# Patient Record
Sex: FEMALE | Race: WHITE | NOT HISPANIC OR LATINO | Employment: FULL TIME | URBAN - METROPOLITAN AREA
[De-identification: names, ages, dates, MRNs, and addresses within clinical notes are randomized per-mention and may not be internally consistent; named-entity substitution may affect disease eponyms.]

---

## 2023-05-01 ENCOUNTER — APPOINTMENT (OUTPATIENT)
Dept: LAB | Facility: LAB | Age: 53
End: 2023-05-01
Payer: COMMERCIAL

## 2023-05-09 LAB
ABO GROUP (TYPE) IN BLOOD: NORMAL
ACTIVATED PARTIAL THROMBOPLASTIN TIME IN PPP BY COAGULATION ASSAY: 31 SEC (ref 26–39)
ANION GAP IN SER/PLAS: 14 MMOL/L (ref 10–20)
ANTIBODY SCREEN: NORMAL
CALCIUM (MG/DL) IN SER/PLAS: 9.7 MG/DL (ref 8.6–10.6)
CARBON DIOXIDE, TOTAL (MMOL/L) IN SER/PLAS: 28 MMOL/L (ref 21–32)
CHLORIDE (MMOL/L) IN SER/PLAS: 105 MMOL/L (ref 98–107)
CREATININE (MG/DL) IN SER/PLAS: 0.83 MG/DL (ref 0.5–1.05)
ERYTHROCYTE DISTRIBUTION WIDTH (RATIO) BY AUTOMATED COUNT: 12.6 % (ref 11.5–14.5)
ERYTHROCYTE MEAN CORPUSCULAR HEMOGLOBIN CONCENTRATION (G/DL) BY AUTOMATED: 33.4 G/DL (ref 32–36)
ERYTHROCYTE MEAN CORPUSCULAR VOLUME (FL) BY AUTOMATED COUNT: 93 FL (ref 80–100)
ERYTHROCYTES (10*6/UL) IN BLOOD BY AUTOMATED COUNT: 4.18 X10E12/L (ref 4–5.2)
GFR FEMALE: 85 ML/MIN/1.73M2
GLUCOSE (MG/DL) IN SER/PLAS: 88 MG/DL (ref 74–99)
HEMATOCRIT (%) IN BLOOD BY AUTOMATED COUNT: 38.9 % (ref 36–46)
HEMOGLOBIN (G/DL) IN BLOOD: 13 G/DL (ref 12–16)
INR IN PPP BY COAGULATION ASSAY: 0.8 (ref 0.9–1.1)
LEUKOCYTES (10*3/UL) IN BLOOD BY AUTOMATED COUNT: 5.5 X10E9/L (ref 4.4–11.3)
NRBC (PER 100 WBCS) BY AUTOMATED COUNT: 0 /100 WBC (ref 0–0)
PLATELETS (10*3/UL) IN BLOOD AUTOMATED COUNT: 222 X10E9/L (ref 150–450)
POTASSIUM (MMOL/L) IN SER/PLAS: 4.2 MMOL/L (ref 3.5–5.3)
PROTHROMBIN TIME (PT) IN PPP BY COAGULATION ASSAY: 9.6 SEC (ref 9.8–13.4)
RH FACTOR: NORMAL
SODIUM (MMOL/L) IN SER/PLAS: 143 MMOL/L (ref 136–145)
UREA NITROGEN (MG/DL) IN SER/PLAS: 14 MG/DL (ref 6–23)

## 2023-08-10 ENCOUNTER — HOSPITAL ENCOUNTER (OUTPATIENT)
Dept: DATA CONVERSION | Facility: HOSPITAL | Age: 53
End: 2023-08-10
Attending: INTERNAL MEDICINE | Admitting: INTERNAL MEDICINE
Payer: COMMERCIAL

## 2023-08-10 DIAGNOSIS — Z80.3 FAMILY HISTORY OF MALIGNANT NEOPLASM OF BREAST: ICD-10-CM

## 2023-08-10 DIAGNOSIS — Z12.11 ENCOUNTER FOR SCREENING FOR MALIGNANT NEOPLASM OF COLON: ICD-10-CM

## 2023-10-16 PROBLEM — D64.9 ANEMIA: Status: ACTIVE | Noted: 2023-10-16

## 2023-10-16 PROBLEM — H31.001 CHORIORETINAL SCAR OF RIGHT EYE: Status: ACTIVE | Noted: 2023-10-16

## 2023-10-16 PROBLEM — N93.9 ABNORMAL UTERINE BLEEDING (AUB): Status: ACTIVE | Noted: 2023-10-16

## 2023-10-16 PROBLEM — Z15.09 MONOALLELIC MUTATION OF PALB2 GENE: Status: ACTIVE | Noted: 2023-10-16

## 2023-10-16 PROBLEM — M25.50 ARTHRALGIA OF MULTIPLE SITES: Status: ACTIVE | Noted: 2023-10-16

## 2023-10-16 PROBLEM — E04.9 ENLARGED THYROID: Status: ACTIVE | Noted: 2023-10-16

## 2023-10-16 PROBLEM — N62 MACROMASTIA: Status: ACTIVE | Noted: 2023-10-16

## 2023-10-16 PROBLEM — R53.83 FATIGUE: Status: ACTIVE | Noted: 2023-10-16

## 2023-10-16 PROBLEM — Z15.89 MONOALLELIC MUTATION OF PALB2 GENE: Status: ACTIVE | Noted: 2023-10-16

## 2023-10-16 PROBLEM — H52.13 BILATERAL MYOPIA: Status: ACTIVE | Noted: 2023-10-16

## 2023-10-16 PROBLEM — E04.1 THYROID NODULE: Status: ACTIVE | Noted: 2023-10-16

## 2023-10-16 PROBLEM — J30.2 SEASONAL ALLERGIES: Status: ACTIVE | Noted: 2023-10-16

## 2023-10-16 PROBLEM — N94.6 DYSMENORRHEA: Status: ACTIVE | Noted: 2023-10-16

## 2023-10-16 PROBLEM — R30.0 DYSURIA: Status: ACTIVE | Noted: 2023-10-16

## 2023-10-16 PROBLEM — E55.9 VITAMIN D DEFICIENCY: Status: ACTIVE | Noted: 2023-10-16

## 2023-10-16 PROBLEM — Z15.01 MONOALLELIC MUTATION OF PALB2 GENE: Status: ACTIVE | Noted: 2023-10-16

## 2023-10-16 RX ORDER — ALBUTEROL SULFATE 90 UG/1
2 AEROSOL, METERED RESPIRATORY (INHALATION) 4 TIMES DAILY PRN
COMMUNITY
Start: 2019-04-15 | End: 2023-11-27

## 2023-10-16 RX ORDER — NORETHINDRONE ACETATE AND ETHINYL ESTRADIOL, AND FERROUS FUMARATE 1MG-20(24)
1 KIT ORAL DAILY
COMMUNITY
Start: 2018-03-20 | End: 2023-11-06

## 2023-10-16 RX ORDER — AZELASTINE 1 MG/ML
2 SPRAY, METERED NASAL 2 TIMES DAILY
COMMUNITY
Start: 2019-05-07 | End: 2023-11-06

## 2023-10-16 RX ORDER — ELECTROLYTES/DEXTROSE
SOLUTION, ORAL ORAL
COMMUNITY
End: 2023-11-06

## 2023-10-16 RX ORDER — IBUPROFEN 800 MG/1
800 TABLET ORAL 3 TIMES DAILY PRN
COMMUNITY
Start: 2016-02-22 | End: 2023-11-06

## 2023-10-16 RX ORDER — PNV NO.95/FERROUS FUM/FOLIC AC 28MG-0.8MG
TABLET ORAL
COMMUNITY
End: 2023-11-06

## 2023-10-17 ENCOUNTER — OFFICE VISIT (OUTPATIENT)
Dept: SURGICAL ONCOLOGY | Facility: HOSPITAL | Age: 53
End: 2023-10-17
Payer: COMMERCIAL

## 2023-10-17 VITALS — HEART RATE: 56 BPM | TEMPERATURE: 96.6 F | DIASTOLIC BLOOD PRESSURE: 84 MMHG | SYSTOLIC BLOOD PRESSURE: 129 MMHG

## 2023-10-17 DIAGNOSIS — Z15.01 MONOALLELIC MUTATION OF PALB2 GENE: Primary | ICD-10-CM

## 2023-10-17 DIAGNOSIS — N94.6 DYSMENORRHEA: ICD-10-CM

## 2023-10-17 DIAGNOSIS — M25.50 ARTHRALGIA OF MULTIPLE SITES: ICD-10-CM

## 2023-10-17 DIAGNOSIS — N62 MACROMASTIA: ICD-10-CM

## 2023-10-17 DIAGNOSIS — E55.9 VITAMIN D DEFICIENCY: ICD-10-CM

## 2023-10-17 DIAGNOSIS — E04.1 THYROID NODULE: ICD-10-CM

## 2023-10-17 DIAGNOSIS — Z15.89 MONOALLELIC MUTATION OF PALB2 GENE: Primary | ICD-10-CM

## 2023-10-17 DIAGNOSIS — Z15.09 MONOALLELIC MUTATION OF PALB2 GENE: Primary | ICD-10-CM

## 2023-10-17 PROCEDURE — 99215 OFFICE O/P EST HI 40 MIN: CPT | Performed by: SURGERY

## 2023-10-17 PROCEDURE — 1036F TOBACCO NON-USER: CPT | Performed by: SURGERY

## 2023-10-17 RX ORDER — SODIUM CHLORIDE 9 MG/ML
100 INJECTION, SOLUTION INTRAVENOUS CONTINUOUS
Status: CANCELLED | OUTPATIENT
Start: 2023-10-17

## 2023-10-17 ASSESSMENT — ENCOUNTER SYMPTOMS
COUGH: 1
CARDIOVASCULAR NEGATIVE: 1
NEUROLOGICAL NEGATIVE: 1
HEMATOLOGIC/LYMPHATIC NEGATIVE: 1
EYES NEGATIVE: 1
PSYCHIATRIC NEGATIVE: 1
MUSCULOSKELETAL NEGATIVE: 1
UNEXPECTED WEIGHT CHANGE: 1
GASTROINTESTINAL NEGATIVE: 1
ENDOCRINE NEGATIVE: 1
ALLERGIC/IMMUNOLOGIC NEGATIVE: 1

## 2023-10-17 NOTE — H&P (VIEW-ONLY)
Subjective   Patient ID: Cheyanne Rubio is a 53 y.o. female.    HPI  Cheyanne Rubio is a very pleasant 53 year old  female with a PALB2 mutation and family history of breast, ovarian, pancreatic, and prostate returning for surgical planning. Bilateral skin sparing mastostomy and ALEKSANDR reconstruction scheduled 11/10/2023.     On 2022 bilateral diagnostic mammogram and left breast ultrasound showed scattered fibroglandular breast tissue. The left breast ultrasound showed normal breast parenchyma at the area of palpable abnormality, BI-RADS Category 1.     On 23, bilateral breast MRI showed no evidence of malignancy in either breast. BI-RADS Category: 2     She met with Dr. Henderson in plastic surgery to discuss reconstruction and is planning bilateral ALEKSANDR. Her preadmission testing visit is scheduled for 23.     She fractured her toe, so she was unable to run a half marathon. She is back to walking and increasing her physical activity. Recent bronchitis, treated with prednisone.       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    FAMILY HISTORY: Mother with pancreatic cancer diagnosed at 75  at 75 in 2023  Father  of pancreatic cancer diagnosed at 72  at 72  Paternal aunt with breast and pancreatic cancer diagnosed at 50  at 65  Paternal aunt with breast and pancreatic cancer diagnosed at 50  at 65  Paternal uncle with prostate and pancreatic cancer diagnosed at 70  at 70  Paternal  cousin diagnosed with ovarian cancer at 48  at 50    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    Other ovarian dysfunction 10/24/2014    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    Pain in unspecified joint 2018    Arthralgia of multiple sites    Personal history of other diseases of the female genital tract     Personal history of endometriosis    Personal history of other diseases of the female genital tract     Personal history of female infertility    Polyp of corpus uteri     Uterine polyp    Stricture and stenosis of cervix uteri     Cervical stenosis (uterine cervix)    Vitamin D deficiency, unspecified 2018    Vitamin D deficiency     Current Outpatient Medications on File Prior to Visit   Medication Sig Dispense Refill    albuterol (ProAir HFA) 90 mcg/actuation inhaler Inhale 2 puffs 4 times a day as needed.      cyanocobalamin (Vitamin B-12) 100 mcg tablet Vitamin B12 100 MCG Oral Tablet   Refills: 0       Active      fish oil concentrate (Omega-3) 120-180 mg capsule Fish Oil 1000 MG Oral Capsule   Refills: 0       Active      azelastine (Astelin) 137 mcg (0.1 %) nasal spray Administer 2 sprays into each nostril 2 times a day.      biotin 5 mg capsule PA Biotin 5000 MCG CAPS   Refills: 0       Active      coffee extract 400 mg tablet Green Coffee Bean Extract CAPS   Refills: 0        Active      ibuprofen 800 mg tablet Take 1 tablet (800 mg) by mouth 3 times a day as needed. With food      norethindrone-e.estradioL-iron (Taytulla) 1 mg-20 mcg (24)/75 mg (4) capsule Take 1 capsule by mouth once daily. AT THE SAME TIME EACH DAY FOR       No current facility-administered medications on file prior to visit.         Review of Systems   Constitutional:  Positive for unexpected weight change.        Recent weight gain   HENT: Negative.     Eyes: Negative.    Respiratory:  Positive for cough.    Cardiovascular: Negative.    Gastrointestinal: Negative.    Endocrine: Negative.    Genitourinary: Negative.    Musculoskeletal: Negative.    Skin: Negative.    Allergic/Immunologic: Negative.    Neurological: Negative.    Hematological: Negative.    Psychiatric/Behavioral: Negative.     All other systems reviewed and are negative.      Objective   Physical Exam  General: Otherwise healthy appearing. No acute distress.     HEENT: Conjunctiva well-colored, sclera non-icteric. Neck supple, trachea midline. No lymphadenopathy or thyromegaly.     Cardiovascular: Regular rate and rhythm.    Respiratory: Clear to auscultation bilaterally.     Breast: Exam performed in the sitting and supine positions. Right breast: No obvious abnormalities palpable. No skin or nipple changes. Left breast: No obvious abnormalities palpable. No skin or nipple changes. Bra size 38DDD, grade 2 ptosis    Abdomen: Soft, non-tender, non-distended.    Extremities: Atraumatic, no edema.     Neurologic: Motor and sensory grossly intact. Alert and oriented.     Lymphatic: No axillary, supraclavicular, or infraclavicular lymphadenopathy bilaterally.       Assessment/Plan   Today we reviewed your pertinent history, physical exam, and imaging. You have a mutation in the PALB2 gene, which increases your risk of breast cancer. Your mammogram and MRI showed no evidence of breast cancer.     We had a long discussion regarding breast cancer risk  reduction strategies, including MRI for increased surveillance, endocrine therapy, and bilateral prophylactic mastectomy. I explained the risks, benefits, and alternatives to bilateral prophylactic mastectomy. We reviewed that there is a 90% reduction in breast cancer risk, but no associated reduction in survival.    Mastectomy means removing all the breast tissue, usually including the nipple and areolar complex. You are planning ALEKSANDR reconstruction. We discussed the risks, benefits, and alternatives to surgery, including bleeding, infection, impaired wound healing, numbness, pain, and breast asymmetry. We reviewed postoperative care and expectations for recovery.    All questions were answered in detail and we are in agreement with the following plan:  1. Bilateral skin sparing prophylactic mastectomy on [facility] 11/10/23.     If you have any questions or concerns, please call my office at [phone] (option #2). I appreciate the opportunity to care for you, and look forward to seeing you soon.    Lynda Barahona MD  Breast Surgical Oncology   Department of Surgery  [facility]  Office: [phone] (option #2)  Fax: [phone]       **DISCLAIMER** Speech recognition software was used to create portions of this document. While an attempt at proofreading has been made, minor errors in transcription may be present.     Diagnoses and all orders for this visit:  Monoallelic mutation of PALB2 gene  -     Case Request Operating Room: Bilateral skin sparing mastectomy; Standing  Macromastia  Thyroid nodule  Vitamin D deficiency  Dysmenorrhea  Arthralgia of multiple sites  Other orders  -     Full code; Standing  -     Vital Signs; Standing  -     Pulse oximetry, spot; Standing  -     Apply MCKAYLA hose knee length; Standing  -     Apply sequential compression device; Standing  -     Insert peripheral IV; Standing  -     Saline lock IV; Standing  -      POCT pregnancy, urine; Standing  -     sodium chloride 0.9% infusion  -     Place in outpatient/hospital ambulatory surgery; Standing  -     ceFAZolin (Ancef) 2 g in dextrose 5 % in water (D5W) 50 mL IV      Scribe Attestation  By signing my name below, I, Lynda Barahona MD, Scribe   attest that this documentation has been prepared under the direction and in the presence of Lynda Barahona MD.

## 2023-10-17 NOTE — PROGRESS NOTES
Subjective   Patient ID: Cheyanne Rubio is a 53 y.o. female.    HPI  Cheyanne Rubio is a very pleasant 53 year old  female with a PALB2 mutation and family history of breast, ovarian, pancreatic, and prostate returning for surgical planning. Bilateral skin sparing mastostomy and ALEKSANDR reconstruction scheduled 11/10/2023.     On 2022 bilateral diagnostic mammogram and left breast ultrasound showed scattered fibroglandular breast tissue. The left breast ultrasound showed normal breast parenchyma at the area of palpable abnormality, BI-RADS Category 1.     On 23, bilateral breast MRI showed no evidence of malignancy in either breast. BI-RADS Category: 2     She met with Dr. Henderson in plastic surgery to discuss reconstruction and is planning bilateral ALEKSANDR. Her preadmission testing visit is scheduled for 23.     She fractured her toe, so she was unable to run a half marathon. She is back to walking and increasing her physical activity. Recent bronchitis, treated with prednisone.       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    FAMILY HISTORY: Mother with pancreatic cancer diagnosed at 75  at 75 in 2023  Father  of pancreatic cancer diagnosed at 72  at 72  Paternal aunt with breast and pancreatic cancer diagnosed at 50  at 65  Paternal aunt with breast and pancreatic cancer diagnosed at 50  at 65  Paternal uncle with prostate and pancreatic cancer diagnosed at 70  at 70  Paternal  cousin diagnosed with ovarian cancer at 48  at 50    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    Other ovarian dysfunction 10/24/2014    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    Pain in unspecified joint 2018    Arthralgia of multiple sites    Personal history of other diseases of the female genital tract     Personal history of endometriosis    Personal history of other diseases of the female genital tract     Personal history of female infertility    Polyp of corpus uteri     Uterine polyp    Stricture and stenosis of cervix uteri     Cervical stenosis (uterine cervix)    Vitamin D deficiency, unspecified 2018    Vitamin D deficiency     Current Outpatient Medications on File Prior to Visit   Medication Sig Dispense Refill    albuterol (ProAir HFA) 90 mcg/actuation inhaler Inhale 2 puffs 4 times a day as needed.      cyanocobalamin (Vitamin B-12) 100 mcg tablet Vitamin B12 100 MCG Oral Tablet   Refills: 0       Active      fish oil concentrate (Omega-3) 120-180 mg capsule Fish Oil 1000 MG Oral Capsule   Refills: 0       Active      azelastine (Astelin) 137 mcg (0.1 %) nasal spray Administer 2 sprays into each nostril 2 times a day.      biotin 5 mg capsule PA Biotin 5000 MCG CAPS   Refills: 0       Active      coffee extract 400 mg tablet Green Coffee Bean Extract CAPS   Refills: 0        Active      ibuprofen 800 mg tablet Take 1 tablet (800 mg) by mouth 3 times a day as needed. With food      norethindrone-e.estradioL-iron (Taytulla) 1 mg-20 mcg (24)/75 mg (4) capsule Take 1 capsule by mouth once daily. AT THE SAME TIME EACH DAY FOR       No current facility-administered medications on file prior to visit.         Review of Systems   Constitutional:  Positive for unexpected weight change.        Recent weight gain   HENT: Negative.     Eyes: Negative.    Respiratory:  Positive for cough.    Cardiovascular: Negative.    Gastrointestinal: Negative.    Endocrine: Negative.    Genitourinary: Negative.    Musculoskeletal: Negative.    Skin: Negative.    Allergic/Immunologic: Negative.    Neurological: Negative.    Hematological: Negative.    Psychiatric/Behavioral: Negative.     All other systems reviewed and are negative.      Objective   Physical Exam  General: Otherwise healthy appearing. No acute distress.     HEENT: Conjunctiva well-colored, sclera non-icteric. Neck supple, trachea midline. No lymphadenopathy or thyromegaly.     Cardiovascular: Regular rate and rhythm.    Respiratory: Clear to auscultation bilaterally.     Breast: Exam performed in the sitting and supine positions. Right breast: No obvious abnormalities palpable. No skin or nipple changes. Left breast: No obvious abnormalities palpable. No skin or nipple changes. Bra size 38DDD, grade 2 ptosis    Abdomen: Soft, non-tender, non-distended.    Extremities: Atraumatic, no edema.     Neurologic: Motor and sensory grossly intact. Alert and oriented.     Lymphatic: No axillary, supraclavicular, or infraclavicular lymphadenopathy bilaterally.       Assessment/Plan   Today we reviewed your pertinent history, physical exam, and imaging. You have a mutation in the PALB2 gene, which increases your risk of breast cancer. Your mammogram and MRI showed no evidence of breast cancer.     We had a long discussion regarding breast cancer risk  reduction strategies, including MRI for increased surveillance, endocrine therapy, and bilateral prophylactic mastectomy. I explained the risks, benefits, and alternatives to bilateral prophylactic mastectomy. We reviewed that there is a 90% reduction in breast cancer risk, but no associated reduction in survival.    Mastectomy means removing all the breast tissue, usually including the nipple and areolar complex. You are planning ALEKSANDR reconstruction. We discussed the risks, benefits, and alternatives to surgery, including bleeding, infection, impaired wound healing, numbness, pain, and breast asymmetry. We reviewed postoperative care and expectations for recovery.    All questions were answered in detail and we are in agreement with the following plan:  1. Bilateral skin sparing prophylactic mastectomy on [facility] 11/10/23.     If you have any questions or concerns, please call my office at [phone] (option #2). I appreciate the opportunity to care for you, and look forward to seeing you soon.    Lynda Barahona MD  Breast Surgical Oncology   Department of Surgery  [facility]  Office: [phone] (option #2)  Fax: [phone]       **DISCLAIMER** Speech recognition software was used to create portions of this document. While an attempt at proofreading has been made, minor errors in transcription may be present.     Diagnoses and all orders for this visit:  Monoallelic mutation of PALB2 gene  -     Case Request Operating Room: Bilateral skin sparing mastectomy; Standing  Macromastia  Thyroid nodule  Vitamin D deficiency  Dysmenorrhea  Arthralgia of multiple sites  Other orders  -     Full code; Standing  -     Vital Signs; Standing  -     Pulse oximetry, spot; Standing  -     Apply MCKAYLA hose knee length; Standing  -     Apply sequential compression device; Standing  -     Insert peripheral IV; Standing  -     Saline lock IV; Standing  -      POCT pregnancy, urine; Standing  -     sodium chloride 0.9% infusion  -     Place in outpatient/hospital ambulatory surgery; Standing  -     ceFAZolin (Ancef) 2 g in dextrose 5 % in water (D5W) 50 mL IV      Scribe Attestation  By signing my name below, I, Lynda Barahona MD, Scribe   attest that this documentation has been prepared under the direction and in the presence of Lynda Barahona MD.

## 2023-10-19 ENCOUNTER — TELEMEDICINE CLINICAL SUPPORT (OUTPATIENT)
Dept: PREADMISSION TESTING | Facility: HOSPITAL | Age: 53
End: 2023-10-19
Payer: COMMERCIAL

## 2023-11-01 NOTE — PROGRESS NOTES
Subjective :  Patient ID: Cheyanne Rubio is a 53 y.o. female.    History of Present Illness: patient is POSITIVE for a pathogenic mutation in the PALB2 gene increasing her chance of breast cancer. She is scheduled for a Bilateral Mastectomy and Bilateral ALEKSANDR reconstruction on 11/10/23 with Dr. Barahona and Dr. Gorman. She presents today prior to surgery to discuss plan of care.     Review of systems  All is negative.     Objective :  Physical Exam   Nursing note reviewed.  Constitutional  She appears well-nourished. She does not appear to be diaphoretic. No distress.     Eyes  Pupils are equal, round, and reactive to light.     General -             Right: Right eye extraocular movements are normal.             Left: Left eye extraocular movements are normal.     Cardiovascular: Normal rate and regular rhythm.     Pulmonary/Chest  Effort normal.  She has no audible wheezing.She exhibits no chest tenderness.     Skin  Skin is warm and dry.     Psychiatric  She has a normal mood and affect. Her behavior is normal. Judgment and thought content normal.       Assessment/Plan :  I had the pleasure of seeing Cheyanne today. She is scheduled for immediate skin sparing mastectomy and bilateral ALEKSANDR/ MS-TRAM reconstruction on this coming Friday. Cheyanne had a few question regarding the surgery, which I answered to the best of my medical knowledge. In summary, Cheyanne would like to achieve B-cup size, and I felt this is a reasonable goal, and it can be achieved by the completion of revision surgeries at a later stage. As the flap will typically lose 15-20% of its volume, I usually over correct by 20%. This is also to accommodate a surprise pathology and possible need for RT. Patient expressed good understanding to this. We also reviewed her CTA again and noted good perforators on the right side permissive for ALEKSANDR, and small perforators on the left suggestive of MS-TRAM, however, the decision will depend on clinical examination at  the time of surgery. Since there is a possibility for MS-TRAM on the left, abdominal mesh is recommended especially that the patient said she does not want to experience bulge or hernia. I reviewed with her the types of mesh used and their pros and cons and possible complications. Patient expressed good understanding and was agreeable to have mesh in case we harvest MS-TRAM or TRAM from left side. Finally, recovery, duration of hospital stay, and what to expect on the first post operative day were discussed with Cheyanne.      We are set to proceed on Friday 11/10/2023 with Dr. Barahona.

## 2023-11-06 ENCOUNTER — PRE-ADMISSION TESTING (OUTPATIENT)
Dept: PREADMISSION TESTING | Facility: HOSPITAL | Age: 53
End: 2023-11-06
Payer: COMMERCIAL

## 2023-11-06 ENCOUNTER — APPOINTMENT (OUTPATIENT)
Dept: PREADMISSION TESTING | Facility: HOSPITAL | Age: 53
End: 2023-11-06
Payer: COMMERCIAL

## 2023-11-06 ENCOUNTER — OFFICE VISIT (OUTPATIENT)
Dept: PLASTIC SURGERY | Facility: CLINIC | Age: 53
End: 2023-11-06
Payer: COMMERCIAL

## 2023-11-06 VITALS
BODY MASS INDEX: 31.55 KG/M2 | TEMPERATURE: 97.7 F | HEIGHT: 67 IN | DIASTOLIC BLOOD PRESSURE: 74 MMHG | SYSTOLIC BLOOD PRESSURE: 107 MMHG | WEIGHT: 201 LBS | HEART RATE: 70 BPM

## 2023-11-06 VITALS
BODY MASS INDEX: 31.63 KG/M2 | HEIGHT: 67 IN | HEART RATE: 70 BPM | SYSTOLIC BLOOD PRESSURE: 119 MMHG | DIASTOLIC BLOOD PRESSURE: 77 MMHG | WEIGHT: 201.5 LBS | TEMPERATURE: 96.9 F | OXYGEN SATURATION: 97 %

## 2023-11-06 DIAGNOSIS — Z01.818 PREOPERATIVE EXAMINATION: Primary | ICD-10-CM

## 2023-11-06 DIAGNOSIS — Z71.89 ENCOUNTER TO DISCUSS BREAST RECONSTRUCTION: Primary | ICD-10-CM

## 2023-11-06 LAB
ABO GROUP (TYPE) IN BLOOD: NORMAL
ANION GAP SERPL CALC-SCNC: 15 MMOL/L (ref 10–20)
ANTIBODY SCREEN: NORMAL
BUN SERPL-MCNC: 13 MG/DL (ref 6–23)
CALCIUM SERPL-MCNC: 9.8 MG/DL (ref 8.6–10.6)
CHLORIDE SERPL-SCNC: 105 MMOL/L (ref 98–107)
CO2 SERPL-SCNC: 24 MMOL/L (ref 21–32)
CREAT SERPL-MCNC: 0.82 MG/DL (ref 0.5–1.05)
ERYTHROCYTE [DISTWIDTH] IN BLOOD BY AUTOMATED COUNT: 12.4 % (ref 11.5–14.5)
GFR SERPL CREATININE-BSD FRML MDRD: 87 ML/MIN/1.73M*2
GLUCOSE SERPL-MCNC: 84 MG/DL (ref 74–99)
HCT VFR BLD AUTO: 40.6 % (ref 36–46)
HGB BLD-MCNC: 13.3 G/DL (ref 12–16)
MCH RBC QN AUTO: 30.6 PG (ref 26–34)
MCHC RBC AUTO-ENTMCNC: 32.8 G/DL (ref 32–36)
MCV RBC AUTO: 93 FL (ref 80–100)
NRBC BLD-RTO: 0 /100 WBCS (ref 0–0)
PLATELET # BLD AUTO: 233 X10*3/UL (ref 150–450)
POTASSIUM SERPL-SCNC: 4.4 MMOL/L (ref 3.5–5.3)
RBC # BLD AUTO: 4.35 X10*6/UL (ref 4–5.2)
RH FACTOR (ANTIGEN D): NORMAL
SODIUM SERPL-SCNC: 140 MMOL/L (ref 136–145)
WBC # BLD AUTO: 6.4 X10*3/UL (ref 4.4–11.3)

## 2023-11-06 PROCEDURE — 85027 COMPLETE CBC AUTOMATED: CPT

## 2023-11-06 PROCEDURE — 1036F TOBACCO NON-USER: CPT

## 2023-11-06 PROCEDURE — 80048 BASIC METABOLIC PNL TOTAL CA: CPT

## 2023-11-06 PROCEDURE — 99214 OFFICE O/P EST MOD 30 MIN: CPT

## 2023-11-06 PROCEDURE — 99204 OFFICE O/P NEW MOD 45 MIN: CPT | Performed by: NURSE PRACTITIONER

## 2023-11-06 PROCEDURE — 86900 BLOOD TYPING SEROLOGIC ABO: CPT

## 2023-11-06 PROCEDURE — 36415 COLL VENOUS BLD VENIPUNCTURE: CPT

## 2023-11-06 RX ORDER — MINERAL OIL
180 ENEMA (ML) RECTAL DAILY
COMMUNITY

## 2023-11-06 ASSESSMENT — ENCOUNTER SYMPTOMS
RESPIRATORY NEGATIVE: 1
NEUROLOGICAL NEGATIVE: 1
MUSCULOSKELETAL NEGATIVE: 1
EYES NEGATIVE: 1
NECK NEGATIVE: 1
CARDIOVASCULAR NEGATIVE: 1
GASTROINTESTINAL NEGATIVE: 1
CONSTITUTIONAL NEGATIVE: 1
ENDOCRINE NEGATIVE: 1

## 2023-11-06 ASSESSMENT — DUKE ACTIVITY SCORE INDEX (DASI)
CAN YOU PARTICIPATE IN STRENOUS SPORTS LIKE SWIMMING, SINGLES TENNIS, FOOTBALL, BASKETBALL, OR SKIING: YES
CAN YOU CLIMB A FLIGHT OF STAIRS OR WALK UP A HILL: YES
CAN YOU DO LIGHT WORK AROUND THE HOUSE LIKE DUSTING OR WASHING DISHES: YES
CAN YOU TAKE CARE OF YOURSELF (EAT, DRESS, BATHE, OR USE TOILET): YES
CAN YOU DO YARD WORK LIKE RAKING LEAVES, WEEDING OR PUSHING A MOWER: YES
CAN YOU PARTICIPATE IN MODERATE RECREATIONAL ACTIVITIES LIKE GOLF, BOWLING, DANCING, DOUBLES TENNIS OR THROWING A BASEBALL OR FOOTBALL: YES
TOTAL_SCORE: 58.2
CAN YOU WALK A BLOCK OR TWO ON LEVEL GROUND: YES
CAN YOU DO HEAVY WORK AROUND THE HOUSE LIKE SCRUBBING FLOORS OR LIFTING AND MOVING HEAVY FURNITURE: YES
CAN YOU HAVE SEXUAL RELATIONS: YES
CAN YOU DO MODERATE WORK AROUND THE HOUSE LIKE VACUUMING, SWEEPING FLOORS OR CARRYING GROCERIES: YES
CAN YOU RUN A SHORT DISTANCE: YES
CAN YOU WALK INDOORS, SUCH AS AROUND YOUR HOUSE: YES
DASI METS SCORE: 9.9

## 2023-11-06 ASSESSMENT — PAIN SCALES - GENERAL: PAINLEVEL: 0-NO PAIN

## 2023-11-06 ASSESSMENT — LIFESTYLE VARIABLES: SMOKING_STATUS: NONSMOKER

## 2023-11-06 NOTE — PREPROCEDURE INSTRUCTIONS
NPO Instructions:    Do not eat any food after midnight the night before your surgery/procedure.  You may have 10 ounces of clear liquids until TWO hours before surgery/procedure. This includes water, black tea/coffee, (no milk or cream) apple juice and electrolyte drinks (Gatorade).  You may chew gum up to TWO hours before your surgery/procedure.    Additional Instructions:     The Day before Surgery:  Review your medication instructions, stop indicated medications  You will be contacted in the evening regarding the time of your arrival to facility and surgery time    Day of Surgery:  Review your medication instructions, take indicated medications  Wear  comfortable loose fitting clothing  Do not use moisturizers, creams, lotions or perfume  All jewelry and valuables should be left at home      Samantha Meeson, MSN, NP-C  Adult-Gerontology Nurse Practitioner II  Department of Anesthesiology and Perioperative Medicine  Main phone [phone]  Direct phone [phone]  Fax [phone]

## 2023-11-06 NOTE — H&P (VIEW-ONLY)
CPM/PAT Evaluation       Name: Cheyanne Rubio (Cheyanne Rubio)  /Age: 1970/53 y.o.     Visit Type:   In-Person       Chief Complaint: genetic risks for breast cancer scheduled for surgery    HPI: Patient is a 53-year-old female scheduled for bilateral cancer risk reduction mastectomies and immediate bilateral ALEKSANDR flap reconstruction on November 10, 2023 for treatment of breast cancer risk factors.  The patient is referred by Dr. 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    Monoallelic mutation of PALB2 gene     Other ovarian dysfunction 10/24/2014    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    Pain in unspecified joint 2018    Arthralgia of multiple sites    Personal history of other diseases of the female genital tract     Personal history of endometriosis    Personal history of other diseases of the female genital tract     Personal history of female infertility    Polyp of corpus uteri     Uterine polyp    Stricture and stenosis of cervix uteri     Cervical stenosis (uterine cervix)    Thyroid nodule     Vitamin D deficiency, unspecified 2018    Vitamin D deficiency        Past Surgical History:   Procedure Laterality Date    CT ABDOMEN PELVIS ANGIOGRAM W AND/OR WO IV CONTRAST  05/22/2023    CT ABDOMEN PELVIS ANGIOGRAM W AND/OR WO IV CONTRAST 5/22/2023 U CT    DILATION AND CURETTAGE OF UTERUS  10/20/2014    Dilation And Curettage    EXPLORATORY LAPAROTOMY      HYSTEROSCOPY      LAPAROSCOPY DIAGNOSTIC / BIOPSY / ASPIRATION / LYSIS  10/20/2014    Exploratory Laparoscopy    OTHER SURGICAL HISTORY  10/20/2014    Hysteroscopy       Patient  has no history on file for sexual activity.    Family History   Problem Relation Name Age of Onset    Hypertension Mother      Pancreatic cancer Mother      Bone cancer Father      Pancreatic cancer Father      Prostate cancer Father      Rheum arthritis Maternal Grandmother      Breast cancer Other aunt        Allergies   Allergen Reactions    Codeine Unknown       Prior to Admission medications    Medication Sig Start Date End Date Taking? Authorizing Provider   albuterol (ProAir HFA) 90 mcg/actuation inhaler Inhale 2 puffs 4 times a day as needed. 4/15/19   Historical Provider, MD   biotin 5 mg capsule PA Biotin 5000 MCG CAPS   Refills: 0       Active    Historical Provider, MD   fexofenadine (Allegra) 180 mg tablet Take 1 tablet (180 mg) by mouth once daily.    Historical Provider, MD   azelastine (Astelin) 137 mcg (0.1 %) nasal spray Administer 2 sprays into each nostril 2 times a day. 5/7/19 11/6/23  Historical Provider, MD   coffee extract 400 mg tablet Green Coffee Bean Extract CAPS   Refills: 0       Active  11/6/23  Historical Provider, MD   cyanocobalamin (Vitamin B-12) 100 mcg tablet Vitamin B12 100 MCG Oral Tablet   Refills: 0       Active  11/6/23  Historical Provider, MD   fish oil concentrate (Omega-3) 120-180 mg capsule Fish Oil 1000 MG Oral Capsule   Refills: 0       Active  11/6/23  Historical Provider, MD   ibuprofen 800 mg tablet Take 1 tablet (800 mg) by mouth 3 times a day as needed. With food 2/22/16 11/6/23  Historical  Provider, MD   norethindrone-e.estradioL-iron (Taytulla) 1 mg-20 mcg (24)/75 mg (4) capsule Take 1 capsule by mouth once daily. AT THE SAME TIME EACH DAY FOR 3/20/18 11/6/23  Historical Provider, MD WELLS ROS:   Constitutional:   neg    Neuro/Psych:   neg    Eyes:   neg    Ears:   neg    Nose:   neg    Mouth:   neg    Throat:   neg    Neck:   neg    Cardio:   neg    Respiratory:   neg    Endocrine:   neg    GI:   neg    :   neg    Musculoskeletal:   neg    Hematologic:   neg    Skin:  neg        Physical Exam  Vitals reviewed.   Constitutional:       Appearance: Normal appearance. She is obese.   HENT:      Head: Normocephalic.      Mouth/Throat:      Mouth: Mucous membranes are dry.   Eyes:      Conjunctiva/sclera: Conjunctivae normal.   Neck:      Vascular: No carotid bruit.   Cardiovascular:      Rate and Rhythm: Normal rate and regular rhythm.      Pulses: Normal pulses.      Heart sounds: Normal heart sounds.   Pulmonary:      Effort: Pulmonary effort is normal.      Breath sounds: Normal breath sounds.   Abdominal:      Palpations: Abdomen is soft.      Tenderness: There is no abdominal tenderness.   Musculoskeletal:         General: Normal range of motion.      Cervical back: Normal range of motion.      Right lower leg: No edema.      Left lower leg: No edema.   Lymphadenopathy:      Cervical: No cervical adenopathy.   Skin:     General: Skin is warm and dry.      Capillary Refill: Capillary refill takes less than 2 seconds.   Neurological:      General: No focal deficit present.      Mental Status: She is alert and oriented to person, place, and time.   Psychiatric:         Mood and Affect: Mood normal.         Behavior: Behavior normal.         Thought Content: Thought content normal.         Judgment: Judgment normal.          RUSSELL AIRWAY:   Airway:     Mallampati::  I    Neck ROM::  Full  normal        Visit Vitals  /77   Pulse 70   Temp 36.1 °C (96.9 °F)       DASI Risk Score      Flowsheet  Row Most Recent Value   DASI SCORE 58.2   METS Score (Will be calculated only when all the questions are answered) 9.9          Caprini DVT Assessment      Flowsheet Row Most Recent Value   DVT Score 13   Current Status Major surgery planned, lasting over 3 hours   History Prior major surgery, Family history of DVT/PE   Age 40-59 years   BMI 31-40 (Obesity)          Modified Frailty Index      Flowsheet Row Most Recent Value   Modified Frailty Index Calculator 0          CHADS2 Stroke Risk  Current as of 12 minutes ago        N/A 3 - 100%: High Risk   2 - 3%: Medium Risk   0 - 2%: Low Risk     Last Change: N/A          This score determines the patient's risk of having a stroke if the patient has atrial fibrillation.        This score is not applicable to this patient. Components are not calculated.          Revised Cardiac Risk Index      Flowsheet Row Most Recent Value   Revised Cardiac Risk Calculator 0          Apfel Simplified Score      Flowsheet Row Most Recent Value   Apfel Simplified Score Calculator 4          Risk Analysis Index Results This Encounter    No data found in the last 1 encounters.       Stop Bang Score      Flowsheet Row Most Recent Value   Do you snore loudly? 0   Do you often feel tired or fatigued after your sleep? 0   Has anyone ever observed you stop breathing in your sleep? 0   Do you have or are you being treated for high blood pressure? 0   Recent BMI (Calculated) 31.6   Is BMI greater than 35 kg/m2? 0=No   Age older than 50 years old? 1=Yes   Is your neck circumference greater than 17 inches (Male) or 16 inches (Female)? 0   Gender - Male 0=No   STOP-BANG Total Score 1            Assessment and Plan:   Neuro:  No neurologic diagnoses, however, the patient is at an increased risk for post operative delirium secondary to duration of surgery.  Preoperative brain exercise educational handout provided to patient.    The patient is at an increased risk for perioperative stroke secondary to  female sex and general anesthesia with op time >2.5 hours.    HEENT/Airway:  No diagnosis or significant findings on chart review or clinical presentation and evaluation.     Cardiovascular:  No diagnosis or significant findings on chart review or clinical presentation and evaluation. No additional preoperative testing is currently indicated.    METS are 9.9    RCRI 0  which is 3.9% 30 day risk of MACE (risk for cardiac death, nonfatal myocardial infarction, and nonfactal cardiac arrest    HÉCTOR score which indicates a 0% risk of intraoperative or 30-day postoperative MACE      Pulmonary:  No diagnosis however significant findings on chart review or clinical presentation and evaluation see below.  Patient provided with preoperative deep breathing exercise educational handout.    ARISCAT: 26 points which is an intermediate risk of in-hospital post-op pulmonary complications     PRODIGY: 0 points which is a low risk of post op opioid induced respiratory depression episodes    STOP BAN points which is a low risk for moderate to severe CELIA    Renal: No diagnosis or significant findings on chart review or clinical presentation and evaluation.     Endocrine:  thyroid nodule under surveillance     Hematologic:   remote anemia now resolved.  Preoperative DVT educational handout provided to patient.    Caprini Score:  13 points which is a highest risk of perioperative VTE    Gastrointestinal:   No diagnosis or significant findings on chart review or clinical presentation and evaluation.     EAT-10 score of 0 - self-perceived oropharyngeal dysphagia scale (0-40)     Apfel:  4  points  79% risk for post operative N/V    Infectious disease:  No diagnosis or significant findings on chart review or clinical presentation and evaluation.      Musculoskeletal:  generalized arthritis managed with OTC PRN pain relievers.     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    Labs ordered  Results for orders placed or performed in visit on 11/06/23 (from the past 96 hour(s))   CBC   Result Value Ref Range    WBC 6.4 4.4 - 11.3 x10*3/uL    nRBC 0.0 0.0 - 0.0 /100 WBCs    RBC 4.35 4.00 - 5.20 x10*6/uL    Hemoglobin 13.3 12.0 - 16.0 g/dL    Hematocrit 40.6 36.0 - 46.0 %    MCV 93 80 - 100 fL    MCH 30.6 26.0 - 34.0 pg    MCHC 32.8 32.0 - 36.0 g/dL    RDW 12.4 11.5 - 14.5 %    Platelets 233 150 - 450 x10*3/uL   Basic Metabolic Panel   Result Value Ref Range    Glucose 84 74 - 99 mg/dL    Sodium 140 136 - 145 mmol/L    Potassium 4.4 3.5 - 5.3 mmol/L    Chloride 105 98 - 107 mmol/L    Bicarbonate 24 21 - 32 mmol/L    Anion Gap 15 10 - 20 mmol/L    Urea Nitrogen 13 6 - 23 mg/dL    Creatinine 0.82 0.50 - 1.05 mg/dL    eGFR 87 >60 mL/min/1.73m*2    Calcium 9.8 8.6 - 10.6 mg/dL   Type And Screen   Result Value Ref Range    ABO TYPE B     Rh TYPE NEG     ANTIBODY SCREEN NEG

## 2023-11-07 NOTE — PROGRESS NOTES
Subjective   Post-operative visit  Cheyanne Rubio is a 53 y.o. female who is here for a post-operative check from surgery 11/10/23 Bilateral mastectomy with ALEKSANDR reconstruction.  She has been experiencing no fevers, chills, or night sweats.     Review of Systems  Review of Systems   Constitutional: Negative.    HENT: Negative.     Eyes: Negative.    Respiratory: Negative.     Cardiovascular: Negative.    Gastrointestinal: Negative.    Endocrine: Negative.    Genitourinary: Negative.    Musculoskeletal: Negative.    Skin: Negative.    Neurological:  Positive for light-headedness and numbness. Negative for tremors and weakness.   Psychiatric/Behavioral: Negative.         Objective   Physical Exam  Constitutional:       Appearance: Normal appearance.   HENT:      Nose: Nose normal.   Eyes:      Extraocular Movements: Extraocular movements intact.      Conjunctiva/sclera: Conjunctivae normal.      Pupils: Pupils are equal, round, and reactive to light.   Cardiovascular:      Rate and Rhythm: Normal rate and regular rhythm.      Pulses: Normal pulses.      Heart sounds: Normal heart sounds.   Pulmonary:      Effort: Pulmonary effort is normal.      Breath sounds: Normal breath sounds.   Abdominal:      General: Bowel sounds are normal. There is no distension.      Palpations: Abdomen is soft. There is no mass.      Tenderness: There is abdominal tenderness. There is no guarding.      Hernia: No hernia is present.   Musculoskeletal:         General: No swelling, tenderness, deformity or signs of injury. Normal range of motion.      Cervical back: Normal range of motion and neck supple.   Skin:     General: Skin is warm.      Findings: Erythema present.      Comments: Inferior mastectomy skin flaps are erythematous and edematous.  Umbilicus is dusky blue with signs of superficial ischemic change.   Neurological:      General: No focal deficit present.      Mental Status: She is alert and oriented to person, place, and time.       Cranial Nerves: No cranial nerve deficit.      Sensory: Sensory deficit present.      Motor: No weakness.      Coordination: Coordination normal.   Psychiatric:         Mood and Affect: Mood normal.         Behavior: Behavior normal.         Thought Content: Thought content normal.         Judgment: Judgment normal.       Assessment/Plan :    I had the pleasure of seeing Ms. Rubio and her  today at clinic. She has been doing well after discharge. Today the wound VAC was removed and drains were pulled out. All her wounds re well healed. We discussed wound care with bacitracin and Aquacel at the umbilicus, and bacitracin BID at the inferior abdominal wound. We discussed using abdominal binder, and patient can wear soft Bra. Home medication were also reviewed, and I'd like to keep her on gabapentin, muscle relaxant, and tramadol (tramadol for a week).   Sensory changes at the superficial radial nerve are present but have improved coma[red with my last exam. No weakness or functional deficit. Superficial vein thrombosis at the right upper extremity has substantially improved.     Follow-up as ordered: To see her next week.

## 2023-11-09 ENCOUNTER — APPOINTMENT (OUTPATIENT)
Dept: PREADMISSION TESTING | Facility: HOSPITAL | Age: 53
End: 2023-11-09
Payer: COMMERCIAL

## 2023-11-09 PROBLEM — Z98.890 STATUS POST BREAST RECONSTRUCTION: Status: ACTIVE | Noted: 2023-11-09

## 2023-11-10 ENCOUNTER — HOSPITAL ENCOUNTER (INPATIENT)
Facility: HOSPITAL | Age: 53
LOS: 6 days | Discharge: HOME | DRG: 584 | End: 2023-11-16
Admitting: SURGERY
Payer: COMMERCIAL

## 2023-11-10 ENCOUNTER — ANESTHESIA EVENT (OUTPATIENT)
Dept: OPERATING ROOM | Facility: HOSPITAL | Age: 53
DRG: 584 | End: 2023-11-10
Payer: COMMERCIAL

## 2023-11-10 ENCOUNTER — ANESTHESIA (OUTPATIENT)
Dept: OPERATING ROOM | Facility: HOSPITAL | Age: 53
DRG: 584 | End: 2023-11-10
Payer: COMMERCIAL

## 2023-11-10 DIAGNOSIS — M79.601 PAIN IN RIGHT ARM: ICD-10-CM

## 2023-11-10 DIAGNOSIS — Z98.890 STATUS POST BREAST RECONSTRUCTION: ICD-10-CM

## 2023-11-10 DIAGNOSIS — M79.89 OTHER SPECIFIED SOFT TISSUE DISORDERS: ICD-10-CM

## 2023-11-10 DIAGNOSIS — R22.31 LOCALIZED SWELLING OF RIGHT UPPER EXTREMITY: ICD-10-CM

## 2023-11-10 DIAGNOSIS — Z15.09 MONOALLELIC MUTATION OF PALB2 GENE: Primary | ICD-10-CM

## 2023-11-10 DIAGNOSIS — Z15.89 MONOALLELIC MUTATION OF PALB2 GENE: Primary | ICD-10-CM

## 2023-11-10 DIAGNOSIS — N62 MACROMASTIA: ICD-10-CM

## 2023-11-10 DIAGNOSIS — Z80.3 FAMILY HISTORY OF BREAST CANCER: ICD-10-CM

## 2023-11-10 DIAGNOSIS — Z80.3 FAMILY HISTORY OF MALIGNANT NEOPLASM OF BREAST: ICD-10-CM

## 2023-11-10 DIAGNOSIS — Z15.01 MONOALLELIC MUTATION OF PALB2 GENE: Primary | ICD-10-CM

## 2023-11-10 PROBLEM — R11.2 PONV (POSTOPERATIVE NAUSEA AND VOMITING): Status: ACTIVE | Noted: 2023-11-10

## 2023-11-10 PROBLEM — J06.9 RECENT URI: Status: ACTIVE | Noted: 2023-11-10

## 2023-11-10 PROBLEM — J06.9 RECENT URI: Status: RESOLVED | Noted: 2023-11-10 | Resolved: 2023-11-10

## 2023-11-10 LAB
ANION GAP BLDA CALCULATED.4IONS-SCNC: 11 MMO/L (ref 10–25)
BASE EXCESS BLDA CALC-SCNC: -2.2 MMOL/L (ref -2–3)
BODY TEMPERATURE: 37 DEGREES CELSIUS
CA-I BLDA-SCNC: 1.24 MMOL/L (ref 1.1–1.33)
CHLORIDE BLDA-SCNC: 109 MMOL/L (ref 98–107)
GLUCOSE BLDA-MCNC: 186 MG/DL (ref 74–99)
HCO3 BLDA-SCNC: 23.7 MMOL/L (ref 22–26)
HCT VFR BLD EST: 39 % (ref 36–46)
HGB BLDA-MCNC: 13 G/DL (ref 12–16)
INHALED O2 CONCENTRATION: 50 %
LACTATE BLDA-SCNC: 1.3 MMOL/L (ref 0.4–2)
OXYHGB MFR BLDA: 97 % (ref 94–98)
PCO2 BLDA: 44 MM HG (ref 38–42)
PH BLDA: 7.34 PH (ref 7.38–7.42)
PO2 BLDA: 137 MM HG (ref 85–95)
POTASSIUM BLDA-SCNC: 4.1 MMOL/L (ref 3.5–5.3)
SAO2 % BLDA: 97 % (ref 94–100)
SODIUM BLDA-SCNC: 140 MMOL/L (ref 136–145)

## 2023-11-10 PROCEDURE — 88307 TISSUE EXAM BY PATHOLOGIST: CPT | Mod: TC,SUR | Performed by: SURGERY

## 2023-11-10 PROCEDURE — 2500000005 HC RX 250 GENERAL PHARMACY W/O HCPCS: Performed by: SURGERY

## 2023-11-10 PROCEDURE — 2500000004 HC RX 250 GENERAL PHARMACY W/ HCPCS (ALT 636 FOR OP/ED)

## 2023-11-10 PROCEDURE — 2500000005 HC RX 250 GENERAL PHARMACY W/O HCPCS: Performed by: STUDENT IN AN ORGANIZED HEALTH CARE EDUCATION/TRAINING PROGRAM

## 2023-11-10 PROCEDURE — 3700000001 HC GENERAL ANESTHESIA TIME - INITIAL BASE CHARGE

## 2023-11-10 PROCEDURE — 3600000009 HC OR TIME - EACH INCREMENTAL 1 MINUTE - PROCEDURE LEVEL FOUR

## 2023-11-10 PROCEDURE — 2780000003 HC OR 278 NO HCPCS

## 2023-11-10 PROCEDURE — 49595 RPR AA HRN 1ST > 10 RDC: CPT

## 2023-11-10 PROCEDURE — 0HTV0ZZ RESECTION OF BILATERAL BREAST, OPEN APPROACH: ICD-10-PCS | Performed by: SURGERY

## 2023-11-10 PROCEDURE — 84132 ASSAY OF SERUM POTASSIUM: CPT | Performed by: STUDENT IN AN ORGANIZED HEALTH CARE EDUCATION/TRAINING PROGRAM

## 2023-11-10 PROCEDURE — A4649 SURGICAL SUPPLIES: HCPCS

## 2023-11-10 PROCEDURE — 2720000007 HC OR 272 NO HCPCS

## 2023-11-10 PROCEDURE — 7100000002 HC RECOVERY ROOM TIME - EACH INCREMENTAL 1 MINUTE

## 2023-11-10 PROCEDURE — 2500000002 HC RX 250 W HCPCS SELF ADMINISTERED DRUGS (ALT 637 FOR MEDICARE OP, ALT 636 FOR OP/ED)

## 2023-11-10 PROCEDURE — 99222 1ST HOSP IP/OBS MODERATE 55: CPT | Performed by: STUDENT IN AN ORGANIZED HEALTH CARE EDUCATION/TRAINING PROGRAM

## 2023-11-10 PROCEDURE — 19364 BRST RCNSTJ FREE FLAP: CPT

## 2023-11-10 PROCEDURE — 1170000001 HC PRIVATE ONCOLOGY ROOM DAILY

## 2023-11-10 PROCEDURE — 3700000002 HC GENERAL ANESTHESIA TIME - EACH INCREMENTAL 1 MINUTE

## 2023-11-10 PROCEDURE — 3600000004 HC OR TIME - INITIAL BASE CHARGE - PROCEDURE LEVEL FOUR

## 2023-11-10 PROCEDURE — 7100000001 HC RECOVERY ROOM TIME - INITIAL BASE CHARGE

## 2023-11-10 PROCEDURE — 19303 MAST SIMPLE COMPLETE: CPT | Performed by: SURGERY

## 2023-11-10 PROCEDURE — A4217 STERILE WATER/SALINE, 500 ML: HCPCS

## 2023-11-10 PROCEDURE — 2500000004 HC RX 250 GENERAL PHARMACY W/ HCPCS (ALT 636 FOR OP/ED): Performed by: STUDENT IN AN ORGANIZED HEALTH CARE EDUCATION/TRAINING PROGRAM

## 2023-11-10 PROCEDURE — 2500000005 HC RX 250 GENERAL PHARMACY W/O HCPCS

## 2023-11-10 PROCEDURE — P9045 ALBUMIN (HUMAN), 5%, 250 ML: HCPCS | Mod: JZ | Performed by: STUDENT IN AN ORGANIZED HEALTH CARE EDUCATION/TRAINING PROGRAM

## 2023-11-10 PROCEDURE — 88307 TISSUE EXAM BY PATHOLOGIST: CPT | Performed by: PATHOLOGY

## 2023-11-10 PROCEDURE — A19364 PR BREAST RECONSTRUC W FREE FLAP: Performed by: STUDENT IN AN ORGANIZED HEALTH CARE EDUCATION/TRAINING PROGRAM

## 2023-11-10 PROCEDURE — 0HRV076 REPLACEMENT OF BILATERAL BREAST USING TRANSVERSE RECTUS ABDOMINIS MYOCUTANEOUS FLAP, OPEN APPROACH: ICD-10-PCS

## 2023-11-10 PROCEDURE — 15860 IV NJX TST VASC FLO FLAP/GRF: CPT

## 2023-11-10 DEVICE — STRATTICE RECONSTRUCTIVE TISSUE MATRIX, 16 X 20, FIRM
Type: IMPLANTABLE DEVICE | Site: ABDOMEN | Status: FUNCTIONAL
Brand: STRATTICE RECONSTRUCTIVE TISSUE MATRIX

## 2023-11-10 RX ORDER — HEPARIN SODIUM 5000 [USP'U]/ML
5000 INJECTION, SOLUTION INTRAVENOUS; SUBCUTANEOUS EVERY 8 HOURS
Status: DISCONTINUED | OUTPATIENT
Start: 2023-11-11 | End: 2023-11-13

## 2023-11-10 RX ORDER — ONDANSETRON HYDROCHLORIDE 2 MG/ML
4 INJECTION, SOLUTION INTRAVENOUS EVERY 8 HOURS PRN
Status: DISCONTINUED | OUTPATIENT
Start: 2023-11-10 | End: 2023-11-16 | Stop reason: HOSPADM

## 2023-11-10 RX ORDER — CEFAZOLIN SODIUM 2 G/100ML
2 INJECTION, SOLUTION INTRAVENOUS ONCE
Status: DISCONTINUED | OUTPATIENT
Start: 2023-11-10 | End: 2023-11-10

## 2023-11-10 RX ORDER — ONDANSETRON 4 MG/1
4 TABLET, FILM COATED ORAL EVERY 8 HOURS PRN
Status: DISCONTINUED | OUTPATIENT
Start: 2023-11-10 | End: 2023-11-16 | Stop reason: HOSPADM

## 2023-11-10 RX ORDER — ALBUMIN HUMAN 50 G/1000ML
SOLUTION INTRAVENOUS AS NEEDED
Status: DISCONTINUED | OUTPATIENT
Start: 2023-11-10 | End: 2023-11-11

## 2023-11-10 RX ORDER — INDOCYANINE GREEN AND WATER 25 MG
KIT INJECTION AS NEEDED
Status: DISCONTINUED | OUTPATIENT
Start: 2023-11-10 | End: 2023-11-11

## 2023-11-10 RX ORDER — PHENYLEPHRINE 10 MG/250 ML(40 MCG/ML)IN 0.9 % SOD.CHLORIDE INTRAVENOUS
CONTINUOUS PRN
Status: DISCONTINUED | OUTPATIENT
Start: 2023-11-10 | End: 2023-11-11

## 2023-11-10 RX ORDER — PHENYLEPHRINE HCL IN 0.9% NACL 0.4MG/10ML
SYRINGE (ML) INTRAVENOUS AS NEEDED
Status: DISCONTINUED | OUTPATIENT
Start: 2023-11-10 | End: 2023-11-11

## 2023-11-10 RX ORDER — ROPIVACAINE IN 0.9% SOD CHL/PF 0.2 %
6 PLASTIC BAG, INJECTION (ML) EPIDURAL CONTINUOUS
Status: DISCONTINUED | OUTPATIENT
Start: 2023-11-10 | End: 2023-11-15

## 2023-11-10 RX ORDER — DEXMEDETOMIDINE HYDROCHLORIDE 4 UG/ML
INJECTION, SOLUTION INTRAVENOUS CONTINUOUS PRN
Status: DISCONTINUED | OUTPATIENT
Start: 2023-11-10 | End: 2023-11-11

## 2023-11-10 RX ORDER — NALOXONE HYDROCHLORIDE 0.4 MG/ML
0.2 INJECTION, SOLUTION INTRAMUSCULAR; INTRAVENOUS; SUBCUTANEOUS EVERY 5 MIN PRN
Status: DISCONTINUED | OUTPATIENT
Start: 2023-11-10 | End: 2023-11-16 | Stop reason: HOSPADM

## 2023-11-10 RX ORDER — CEFAZOLIN 1 G/1
INJECTION, POWDER, FOR SOLUTION INTRAVENOUS AS NEEDED
Status: DISCONTINUED | OUTPATIENT
Start: 2023-11-10 | End: 2023-11-11

## 2023-11-10 RX ORDER — LIDOCAINE HYDROCHLORIDE 10 MG/ML
INJECTION, SOLUTION EPIDURAL; INFILTRATION; INTRACAUDAL; PERINEURAL AS NEEDED
Status: DISCONTINUED | OUTPATIENT
Start: 2023-11-10 | End: 2023-11-11 | Stop reason: HOSPADM

## 2023-11-10 RX ORDER — METHOCARBAMOL 100 MG/ML
1000 INJECTION, SOLUTION INTRAMUSCULAR; INTRAVENOUS EVERY 8 HOURS
Status: DISCONTINUED | OUTPATIENT
Start: 2023-11-10 | End: 2023-11-16

## 2023-11-10 RX ORDER — DIPHENHYDRAMINE HYDROCHLORIDE 50 MG/ML
25 INJECTION INTRAMUSCULAR; INTRAVENOUS EVERY 4 HOURS PRN
Status: DISCONTINUED | OUTPATIENT
Start: 2023-11-10 | End: 2023-11-16 | Stop reason: HOSPADM

## 2023-11-10 RX ORDER — KETOROLAC TROMETHAMINE 30 MG/ML
30 INJECTION, SOLUTION INTRAMUSCULAR; INTRAVENOUS EVERY 6 HOURS SCHEDULED
Status: DISCONTINUED | OUTPATIENT
Start: 2023-11-10 | End: 2023-11-11

## 2023-11-10 RX ORDER — MIDAZOLAM HYDROCHLORIDE 1 MG/ML
INJECTION INTRAMUSCULAR; INTRAVENOUS AS NEEDED
Status: DISCONTINUED | OUTPATIENT
Start: 2023-11-10 | End: 2023-11-11

## 2023-11-10 RX ORDER — ACETAMINOPHEN 325 MG/1
650 TABLET ORAL EVERY 6 HOURS SCHEDULED
Status: DISCONTINUED | OUTPATIENT
Start: 2023-11-10 | End: 2023-11-16 | Stop reason: HOSPADM

## 2023-11-10 RX ORDER — DEXAMETHASONE SODIUM PHOSPHATE 4 MG/ML
INJECTION, SOLUTION INTRA-ARTICULAR; INTRALESIONAL; INTRAMUSCULAR; INTRAVENOUS; SOFT TISSUE AS NEEDED
Status: DISCONTINUED | OUTPATIENT
Start: 2023-11-10 | End: 2023-11-11

## 2023-11-10 RX ORDER — SCOLOPAMINE TRANSDERMAL SYSTEM 1 MG/1
PATCH, EXTENDED RELEASE TRANSDERMAL AS NEEDED
Status: DISCONTINUED | OUTPATIENT
Start: 2023-11-10 | End: 2023-11-11

## 2023-11-10 RX ORDER — APREPITANT 40 MG/1
CAPSULE ORAL AS NEEDED
Status: DISCONTINUED | OUTPATIENT
Start: 2023-11-10 | End: 2023-11-11

## 2023-11-10 RX ORDER — ONDANSETRON HYDROCHLORIDE 2 MG/ML
INJECTION, SOLUTION INTRAVENOUS AS NEEDED
Status: DISCONTINUED | OUTPATIENT
Start: 2023-11-10 | End: 2023-11-11

## 2023-11-10 RX ORDER — NALOXONE HYDROCHLORIDE 0.4 MG/ML
0.2 INJECTION, SOLUTION INTRAMUSCULAR; INTRAVENOUS; SUBCUTANEOUS EVERY 5 MIN PRN
Status: DISCONTINUED | OUTPATIENT
Start: 2023-11-10 | End: 2023-11-10

## 2023-11-10 RX ORDER — MAGNESIUM HYDROXIDE 2400 MG/10ML
10 SUSPENSION ORAL DAILY PRN
Status: DISCONTINUED | OUTPATIENT
Start: 2023-11-10 | End: 2023-11-15

## 2023-11-10 RX ORDER — DOCUSATE SODIUM 100 MG/1
100 CAPSULE, LIQUID FILLED ORAL 2 TIMES DAILY
Status: DISCONTINUED | OUTPATIENT
Start: 2023-11-10 | End: 2023-11-16 | Stop reason: HOSPADM

## 2023-11-10 RX ORDER — TRAMADOL HYDROCHLORIDE 50 MG/1
50 TABLET ORAL EVERY 4 HOURS PRN
Status: DISCONTINUED | OUTPATIENT
Start: 2023-11-10 | End: 2023-11-16 | Stop reason: HOSPADM

## 2023-11-10 RX ORDER — TRAMADOL HYDROCHLORIDE 50 MG/1
100 TABLET ORAL EVERY 4 HOURS PRN
Status: DISCONTINUED | OUTPATIENT
Start: 2023-11-10 | End: 2023-11-16 | Stop reason: HOSPADM

## 2023-11-10 RX ORDER — LIDOCAINE HCL/PF 100 MG/5ML
SYRINGE (ML) INTRAVENOUS AS NEEDED
Status: DISCONTINUED | OUTPATIENT
Start: 2023-11-10 | End: 2023-11-11

## 2023-11-10 RX ORDER — SODIUM CHLORIDE 9 MG/ML
100 INJECTION, SOLUTION INTRAVENOUS CONTINUOUS
Status: DISCONTINUED | OUTPATIENT
Start: 2023-11-10 | End: 2023-11-12

## 2023-11-10 RX ORDER — HYDROMORPHONE HYDROCHLORIDE 1 MG/ML
0.2 INJECTION, SOLUTION INTRAMUSCULAR; INTRAVENOUS; SUBCUTANEOUS EVERY 4 HOURS PRN
Status: DISCONTINUED | OUTPATIENT
Start: 2023-11-10 | End: 2023-11-11

## 2023-11-10 RX ORDER — PROPOFOL 10 MG/ML
INJECTION, EMULSION INTRAVENOUS AS NEEDED
Status: DISCONTINUED | OUTPATIENT
Start: 2023-11-10 | End: 2023-11-11

## 2023-11-10 RX ORDER — CEFAZOLIN SODIUM 2 G/100ML
2 INJECTION, SOLUTION INTRAVENOUS EVERY 8 HOURS
Status: DISCONTINUED | OUTPATIENT
Start: 2023-11-10 | End: 2023-11-11

## 2023-11-10 RX ORDER — SODIUM CHLORIDE 0.9 G/100ML
IRRIGANT IRRIGATION AS NEEDED
Status: DISCONTINUED | OUTPATIENT
Start: 2023-11-10 | End: 2023-11-11 | Stop reason: HOSPADM

## 2023-11-10 RX ORDER — ROCURONIUM BROMIDE 10 MG/ML
INJECTION, SOLUTION INTRAVENOUS AS NEEDED
Status: DISCONTINUED | OUTPATIENT
Start: 2023-11-10 | End: 2023-11-11

## 2023-11-10 RX ORDER — HYDROMORPHONE HYDROCHLORIDE 1 MG/ML
INJECTION, SOLUTION INTRAMUSCULAR; INTRAVENOUS; SUBCUTANEOUS AS NEEDED
Status: DISCONTINUED | OUTPATIENT
Start: 2023-11-10 | End: 2023-11-11

## 2023-11-10 RX ORDER — PAPAVERINE HYDROCHLORIDE 30 MG/ML
INJECTION INTRAMUSCULAR; INTRAVENOUS AS NEEDED
Status: DISCONTINUED | OUTPATIENT
Start: 2023-11-10 | End: 2023-11-11 | Stop reason: HOSPADM

## 2023-11-10 RX ORDER — SODIUM CHLORIDE 9 MG/ML
100 INJECTION, SOLUTION INTRAVENOUS CONTINUOUS
Status: DISCONTINUED | OUTPATIENT
Start: 2023-11-10 | End: 2023-11-10

## 2023-11-10 RX ORDER — GABAPENTIN 300 MG/1
600 CAPSULE ORAL EVERY 12 HOURS
Status: DISCONTINUED | OUTPATIENT
Start: 2023-11-10 | End: 2023-11-16 | Stop reason: HOSPADM

## 2023-11-10 RX ORDER — ASPIRIN 81 MG/1
81 TABLET ORAL DAILY
Status: DISCONTINUED | OUTPATIENT
Start: 2023-11-11 | End: 2023-11-16 | Stop reason: HOSPADM

## 2023-11-10 RX ORDER — PROPOFOL 10 MG/ML
INJECTION, EMULSION INTRAVENOUS CONTINUOUS PRN
Status: DISCONTINUED | OUTPATIENT
Start: 2023-11-10 | End: 2023-11-11

## 2023-11-10 RX ADMIN — ROCURONIUM BROMIDE 30 MG: 50 INJECTION, SOLUTION INTRAVENOUS at 12:33

## 2023-11-10 RX ADMIN — CEFAZOLIN 2 G: 330 INJECTION, POWDER, FOR SOLUTION INTRAMUSCULAR; INTRAVENOUS at 20:21

## 2023-11-10 RX ADMIN — INDOCYANINE GREEN 7.5 MG: KIT INTRAVENOUS at 22:31

## 2023-11-10 RX ADMIN — Medication 12 ML/HR: at 15:05

## 2023-11-10 RX ADMIN — DEXMEDETOMIDINE HYDROCHLORIDE 1 MCG/KG/HR: 4 INJECTION, SOLUTION INTRAVENOUS at 08:33

## 2023-11-10 RX ADMIN — LIDOCAINE HYDROCHLORIDE 100 MG: 20 INJECTION INTRAVENOUS at 08:07

## 2023-11-10 RX ADMIN — Medication 0.4 MCG/KG/MIN: at 20:06

## 2023-11-10 RX ADMIN — ROCURONIUM BROMIDE 20 MG: 50 INJECTION, SOLUTION INTRAVENOUS at 11:41

## 2023-11-10 RX ADMIN — ROCURONIUM BROMIDE 10 MG: 50 INJECTION, SOLUTION INTRAVENOUS at 09:10

## 2023-11-10 RX ADMIN — ROCURONIUM BROMIDE 30 MG: 50 INJECTION, SOLUTION INTRAVENOUS at 19:05

## 2023-11-10 RX ADMIN — SODIUM CHLORIDE, POTASSIUM CHLORIDE, SODIUM LACTATE AND CALCIUM CHLORIDE: 600; 310; 30; 20 INJECTION, SOLUTION INTRAVENOUS at 23:06

## 2023-11-10 RX ADMIN — ROCURONIUM BROMIDE 50 MG: 50 INJECTION, SOLUTION INTRAVENOUS at 08:07

## 2023-11-10 RX ADMIN — ROCURONIUM BROMIDE 30 MG: 50 INJECTION, SOLUTION INTRAVENOUS at 13:20

## 2023-11-10 RX ADMIN — ROCURONIUM BROMIDE 10 MG: 50 INJECTION, SOLUTION INTRAVENOUS at 10:37

## 2023-11-10 RX ADMIN — ALBUMIN HUMAN 250 ML: 0.05 INJECTION, SOLUTION INTRAVENOUS at 22:16

## 2023-11-10 RX ADMIN — ROCURONIUM BROMIDE 20 MG: 50 INJECTION, SOLUTION INTRAVENOUS at 16:05

## 2023-11-10 RX ADMIN — ROCURONIUM BROMIDE 20 MG: 50 INJECTION, SOLUTION INTRAVENOUS at 12:11

## 2023-11-10 RX ADMIN — ROCURONIUM BROMIDE 10 MG: 50 INJECTION, SOLUTION INTRAVENOUS at 10:02

## 2023-11-10 RX ADMIN — ROCURONIUM BROMIDE 20 MG: 50 INJECTION, SOLUTION INTRAVENOUS at 14:32

## 2023-11-10 RX ADMIN — APREPITANT 40 MG: 40 CAPSULE ORAL at 07:10

## 2023-11-10 RX ADMIN — SODIUM CHLORIDE, POTASSIUM CHLORIDE, SODIUM LACTATE AND CALCIUM CHLORIDE: 600; 310; 30; 20 INJECTION, SOLUTION INTRAVENOUS at 08:00

## 2023-11-10 RX ADMIN — SCOPALAMINE 1 PATCH: 1 PATCH, EXTENDED RELEASE TRANSDERMAL at 07:10

## 2023-11-10 RX ADMIN — Medication 40 MCG: at 22:52

## 2023-11-10 RX ADMIN — ROCURONIUM BROMIDE 20 MG: 50 INJECTION, SOLUTION INTRAVENOUS at 13:46

## 2023-11-10 RX ADMIN — ROCURONIUM BROMIDE 10 MG: 50 INJECTION, SOLUTION INTRAVENOUS at 22:33

## 2023-11-10 RX ADMIN — ALBUMIN HUMAN 250 ML: 0.05 INJECTION, SOLUTION INTRAVENOUS at 20:10

## 2023-11-10 RX ADMIN — DEXAMETHASONE SODIUM PHOSPHATE 8 MG: 4 INJECTION, SOLUTION INTRAMUSCULAR; INTRAVENOUS at 08:09

## 2023-11-10 RX ADMIN — PROPOFOL 75 MCG/KG/MIN: 10 INJECTION, EMULSION INTRAVENOUS at 08:18

## 2023-11-10 RX ADMIN — DEXTROSE MONOHYDRATE 0.2 MG/KG/HR: 50 INJECTION, SOLUTION INTRAVENOUS at 10:31

## 2023-11-10 RX ADMIN — HYDROMORPHONE HYDROCHLORIDE 0.4 MG: 1 INJECTION, SOLUTION INTRAMUSCULAR; INTRAVENOUS; SUBCUTANEOUS at 22:44

## 2023-11-10 RX ADMIN — ROCURONIUM BROMIDE 10 MG: 50 INJECTION, SOLUTION INTRAVENOUS at 08:47

## 2023-11-10 RX ADMIN — ROCURONIUM BROMIDE 20 MG: 50 INJECTION, SOLUTION INTRAVENOUS at 11:08

## 2023-11-10 RX ADMIN — CEFAZOLIN 2 G: 330 INJECTION, POWDER, FOR SOLUTION INTRAMUSCULAR; INTRAVENOUS at 12:15

## 2023-11-10 RX ADMIN — MIDAZOLAM HYDROCHLORIDE 1 MG: 1 INJECTION, SOLUTION INTRAMUSCULAR; INTRAVENOUS at 08:29

## 2023-11-10 RX ADMIN — CEFAZOLIN 2 G: 330 INJECTION, POWDER, FOR SOLUTION INTRAMUSCULAR; INTRAVENOUS at 08:15

## 2023-11-10 RX ADMIN — ALBUMIN HUMAN 250 ML: 0.05 INJECTION, SOLUTION INTRAVENOUS at 17:52

## 2023-11-10 RX ADMIN — Medication 40 MCG: at 20:09

## 2023-11-10 RX ADMIN — ROCURONIUM BROMIDE 20 MG: 50 INJECTION, SOLUTION INTRAVENOUS at 21:31

## 2023-11-10 RX ADMIN — INDOCYANINE GREEN 7.5 MG: KIT INTRAVENOUS at 15:01

## 2023-11-10 RX ADMIN — CEFAZOLIN 2 G: 330 INJECTION, POWDER, FOR SOLUTION INTRAMUSCULAR; INTRAVENOUS at 16:30

## 2023-11-10 RX ADMIN — PROPOFOL 200 MG: 10 INJECTION, EMULSION INTRAVENOUS at 08:07

## 2023-11-10 RX ADMIN — ONDANSETRON 4 MG: 2 INJECTION INTRAMUSCULAR; INTRAVENOUS at 23:15

## 2023-11-10 RX ADMIN — MIDAZOLAM HYDROCHLORIDE 1 MG: 1 INJECTION, SOLUTION INTRAMUSCULAR; INTRAVENOUS at 08:00

## 2023-11-10 SDOH — HEALTH STABILITY: MENTAL HEALTH: CURRENT SMOKER: 0

## 2023-11-10 ASSESSMENT — COLUMBIA-SUICIDE SEVERITY RATING SCALE - C-SSRS
6. HAVE YOU EVER DONE ANYTHING, STARTED TO DO ANYTHING, OR PREPARED TO DO ANYTHING TO END YOUR LIFE?: NO
2. HAVE YOU ACTUALLY HAD ANY THOUGHTS OF KILLING YOURSELF?: NO
1. IN THE PAST MONTH, HAVE YOU WISHED YOU WERE DEAD OR WISHED YOU COULD GO TO SLEEP AND NOT WAKE UP?: NO

## 2023-11-10 NOTE — DISCHARGE INSTRUCTIONS
Plastic Surgery Post Discharge Instructions  You were admitted to [facility] for postoperative monitoring and control of pain following bilateral mastectomy and ALEKSANDR breast reconstruction on 11/10/2023 with Dr. Barahona of breast surgery and Dr. Gorman of plastic surgery. Included below are post-discharge instructions and details regarding follow-up.     Thank you for allowing us to participate in your care and we wish you the best!    Best Regards,  [facility]  Department of Plastic and Reconstructive Surgery    Activity:  Continue to limit full abdominal extension, maintain beach chair positioning when sitting and walk slightly hunched over to prevent tension on abdominal incisions. No pushing, pulling or lifting objects greater than 5 pounds. Ensure no compression is applied to the bilateral breast free flap sites. Please do not apply ice or heat directly to free flap without barrier in place.     You may locally bathe following discharge. Avoid soaking or submerging surgical incisions/sites or wetting wound vac dressing or device.     Surgical Site/Wound Care:  You are being discharge with a Prevena incisional wound vac in place over a closed surgical incision to your abdomen. Please allow Prevena wound vac dressing to remain in place until output follow-up with plastic surgery. When showering, do not allow the wound vac dressing or device to become wet. When resting, please make sure to plug in the device with the supplied power cord to maintain the battery. The device has a power button at the center which is encircled by green lights. There will be one less light illuminated each day (every 24 hours) which is to be expected, and signifies the count down of the duration of the vac device. If you experience any issues with your wound vac including alarms, malfunction or dressing issues please refer to the provided instruction manual or contact the plastic  surgery office at [phone].     Okay to leave bilateral breast incision lines open to air. Avoid application of creams, lotions or ointments to surgical site, no soaking or scrubbing of surgical sites.     Continue to monitor flaps for changes in general appearance, color, temperature and turgor. Please additionally monitor for any developing signs of infection which may include increased redness, swelling, fever/chills, green/yellow drainage, or foul odor from surgical sites or wounds. If any signs of infection or changes in flap appearance are to occur, please immediately contact the plastic surgery office.     Drain Care:  You are being discharged with one ROBBY drain to your right breast. To empty the drain, open the cap, tip into cup and squeeze to empty. Squeeze drain flat then replace the cap.  Please empty the drain and record its output 3 times a day and bring these numbers to your follow up appointment.  The drain output should decrease and the color of the drainage should become lighter (red to pink to yellow).  This drain is sutured into place.  Keep the area around the drain clean and dry.  You may use mild soap and water to cleanse around the drain.  It is ok to shower; do not soak in a tub. Change the gauze around the drain as needed.  Call the office if you notice drastic changes in drain output, bloody drain output, or redness/drainage around insertion site.    Nutrition:  You may resume a regular diet following surgery. Ensure that you are drinking an adequate amount of fluids to maintain hydration.     Medication Instructions:  You may resume use of your home prescribed mediations as previously directed following discharge from the hospital. If you were taking medications prior to your surgery and they are not listed on your discharge homegoing instructions medications list, consult your MD before you resume these medications.    Some postoperative pain is not unusual. This is usually relieved by  taking prescribed or over the counter Acetaminophen/Tylenol, Motrin/ibuprofen. In cases of severe pain, you may use prescribed oxycodone as directed. Severe pain despite administration of pain medication must be reported to your physician.    Remember when taking Acetaminophen, do NOT exceed more than 1000 milligrams (mg) per dose or more than 4000 mg total per day. Taking too much Acetaminophen at one time can damage your liver. The maximum amount of ibuprofen in adults is 800 mg per dose or 3200 mg per day. Call your MD if you have any questions about your medications. To prevent constipation while taking narcotic pain medications, please utilize your prescribed bowel regimen, ensure that you drink plenty of water, eat fiber rich foods (a good source is fruit) and increase activity progressively.    DO NOT drive a car while utilizing narcotic pain medications and until cleared by MD at follow-up appointment. Driving or operating heavy machinery, lawnmowers or power tools while taking opiod/narcotic pain medications may impair your judgement.    Call Physician If:  Contact the plastic surgery office for any questions and/or concerns regarding the surgical incision/site.  1. [phone] if Monday-Friday (8 a.m. - 4:30 p.m.)  2. [phone] and ask for the Plastic Surgery team on call provider if after hours or on weekends  3. Email [email or web address] for any non-urgent concerns and when relaying weekly progress photos of flap sites.     Call your MD or seek immediate medical attention if you experience any of the following symptoms:  1. Fever of 101.5 (38.5 C) or greater  2. Pain not controlled with prescribed pain medications  3. Uncontrolled nausea and/or vomiting  4. Drainage or swelling around your incisions and/or surgical sites   5. Separation of incisions, or tearing of the incision line  6. Large fluid collection under or around the incision or flap sites   7. Flap discoloration (including  darkened appearance)  8. Difficulty breathing  9. Swelling, pain, heat and/or redness in your legs and/or calves  10. Inability to tolerate diet/fluid intake    Follow-up/Post Discharge Appointments:  Follow-up care is a key part of your treatment and safety. It is very important that you maintain follow-up care as directed so that your surgical site heals properly and does not lead to problems. Always carry a current medication list with you and bring it to ALL healthcare Provider visits. Be sure to maintain follow up with plastic surgery at your scheduled appointment. If you are unable to keep your appointment, or need to reschedule please contact our office at [phone].     Follow up appointment with plastic surgery department scheduled for 11/20 at 8:00 AM   [facility] [address] [facility] [address]

## 2023-11-10 NOTE — ANESTHESIA PROCEDURE NOTES
Arterial Line:    Date/Time: 11/10/2023 8:28 AM    Staffing  Performed: [facility]   Authorized by: Domenic Velasco MD    Performed by: SHRADDHA Dupree    An arterial line was placed. Procedure performed using ultrasound guidance.in the OR for the following indication(s): continuous blood pressure monitoring and blood sampling needed.    A 20 gauge (size), 1 and 3/4 inch (length), Angiocath (type) catheter was placed into the Left radial artery, secured by Tegaderm,   Seldinger technique used.  Events:  patient tolerated procedure well with no complications.

## 2023-11-10 NOTE — PROGRESS NOTES
Acute Pain Service    Postop Pain HPI -   Palliative: relieved with IV analgesics and regional local anesthetics  Provocative: movement  Quality:  burning and aching  Radiation:  none  Severity:  10/10  Timing: constant    24-HOUR OPIOID CONSUMPTION:  Dilaudid 1 mg    Scheduled medications  acetaminophen, 650 mg, oral, q6h ADY  [START ON 11/11/2023] aspirin, 81 mg, oral, Daily  ceFAZolin, 2 g, intravenous, q8h  docusate sodium, 100 mg, oral, BID  gabapentin, 600 mg, oral, q12h  [START ON 11/11/2023] heparin (porcine), 5,000 Units, subcutaneous, q8h  ketorolac, 30 mg, intravenous, q6h ADY  methocarbamol, 1,000 mg, intravenous, q8h      Continuous medications  ketamine, 0.05-2 mg/kg/hr, Last Rate: 0.2 mg/kg/hr (11/10/23 1031)  ropivacaine (PF) in NS cmpd, 6 mL/hr  ropivacaine (PF) in NS cmpd, 6 mL/hr  sodium chloride 0.9%, 100 mL/hr      PRN medications  PRN medications: diphenhydrAMINE, heparin (porcine) 2,500 Units in sodium chloride 0.9 % 250 mL irrigation, HYDROmorphone, lidocaine PF, magnesium hydroxide, naloxone, ondansetron **OR** ondansetron, papaverine, traMADol, traMADol     Physical Exam:  Constitutional:  no distress, alert and cooperative  Eyes: clear sclera  Head/Neck: No apparent injury, trachea midline  Respiratory/Thorax: Patent airways, thorax symmetric, breathing comfortably  Cardiovascular: no pitting edema  Gastrointestinal: Nondistended  Musculoskeletal: ROM intact  Extremities: no clubbing  Neurological: alert, perez x4  Psychological: Appropriate affect    No results found for this or any previous visit (from the past 24 hour(s)).      Cheyanne Rubio is a 53 y.o. year old female patient presenting for Bilateral cancer risk reduction mastectomies, bilateral ALEKSANDR flap reconstruction, abdominal wall reconstruction, BL skin sparing mastectomy with Dr. Gorman/Jun on 11/10/23. Acute pain was consulted for postoperative pain management.      T6 paravertebral single shot nerve blocks bilateral + T6  erector spinae plane blocks with with catheters, T10 paravertebral single shot nerve blocks bilateral + T10 erector spinae plane blocks with catheters placed preoperatively on 11/10/23    PLAN  - Add Dilaudid PCA and Amitriptyline 25 mg at bedtime  - Continue Ambit ball with Ropivacaine 0.2%/NaCl 0.9% 500mL, Rate 7cc/hr  - Ambit medication will not interfere with pain medication prescribed by the primary team.   - Please be aware of local anesthetic toxic dose and absorption variability before considering lidocaine patches  - Acute pain service will follow while catheters in place  - Rest of pain management per primary team     Acute Pain Resident  pg [phone] ph [phone]

## 2023-11-10 NOTE — HOSPITAL COURSE
BRIEF HISTORY:    Cheyanne Rubio is a 53 y.o. 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 Barahona of breast surgery and Dr. Gorman of plastic surgery for bilateral cancer risk reduction mastectomies followed by immediate bilateral ALEKSANDR free flap reconstruction on 11/20/2023.     HOSPITAL COURSE:    Pt admitted to the plastic surgery service post operatively for close monitoring of free flap reconstruction. Acute pain consulted for assistance with pain postoperatively and to management nerve blocks placed intraoperatively 11/10 and elected to place patient on a PCA pump immediately post operatively. POD3 transfused 1u prbc for hgb 7.7 and s/s lightheadedness. Pump weaned POD4. Parker removed POD4. Voiding without issue. PT eval POD4 rec safe DC to home with PT. POD4 noted some R hand swelling and pain likely 2/2 to repeated IV insertion that has been progressing and healing well. RUE US ordered DOD and revealed superficial thrombophlebitis. LUE US reveals normal flow. Bruising noted to bilat breasts immediately post-op that healed well with nitropaste application.     DAY OF DISCHARGE:    On the day of discharge, the patient was seen and evaluated by the Plastic Surgery team and deemed suitable for discharge home.  There were no significant events overnight. Vitals were reviewed and within normal limits. Labs were stable at discharge. On day of discharge the patient was tolerating a diet, pain was controlled on PO pain medication, was ambulating well and voiding spontaneously. The patient was given detailed discharge instructions and were scheduled to follow up as an outpatient.

## 2023-11-10 NOTE — PROCEDURES
Peripheral Block    Patient location during procedure: pre-op  Start time: 11/10/2023 6:20 AM  End time: 11/10/2023 7:00 AM  Reason for block: post-op pain management  Staffing  Performed: resident   Authorized by: Felix Summers MD    Performed by: Felix Summers MD  Preanesthetic Checklist  Completed: patient identified, IV checked, site marked, risks and benefits discussed, surgical consent, monitors and equipment checked, pre-op evaluation and timeout performed   Timeout performed at: 11/10/2023 6:30 AM  Peripheral Block  Patient position: sitting  Prep: ChloraPrep  Patient monitoring: heart rate and continuous pulse ox  Block type: JACE  Laterality: B/L  Injection technique: catheter  Guidance: ultrasound guided  Local infiltration: lidocaine  Needle  Needle type: Tuohy   Needle gauge: 26 G  Needle length: 10 cm  Needle localization: ultrasound guidance     image stored in chart  Assessment  Injection assessment: negative aspiration for heme, no paresthesia on injection, incremental injection and local visualized surrounding nerve on ultrasound  Paresthesia pain: none  Heart rate change: no  Slow fractionated injection: yes  Additional Notes  Erector spinae plane block: Informed consent obtained.  Risks, benefits, and alternatives discussed.  ASA monitors placed, and timeout performed.  Patient positioned, prepped with chlorhexidine, and draped with sterile towels.  Ultrasound guidance used to visualize the erector spine a muscle above the TP/costal junction at T6 and T10 bilateral.  Good visualization of the needle throughout duration of the procedure.  Aspiration was negative.  10 cc of normal saline injected with visualization of spread.  Catheters threaded and secured. Patient tolerated procedure well.    Timeout by Neha BEEBE        Peripheral Block    Patient location during procedure: pre-op  Start time: 11/10/2023 6:20 AM  End time: 11/10/2023 7:00 AM  Reason for block: post-op  pain management  Staffing  Performed: resident   Authorized by: Felix Summers MD    Performed by: Felix Summers MD  Preanesthetic Checklist  Completed: patient identified, IV checked, site marked, risks and benefits discussed, surgical consent, monitors and equipment checked, pre-op evaluation and timeout performed   Timeout performed at: 11/10/2023 6:30 AM  Peripheral Block  Patient position: sitting  Prep: ChloraPrep  Patient monitoring: heart rate and continuous pulse ox  Block type: other (Paravertebral)  Laterality: B/L  Injection technique: catheter  Guidance: ultrasound guided  Local infiltration: lidocaine  Needle  Needle type: Tuohy   Needle gauge: 26 G  Needle length: 8 cm  Needle localization: ultrasound guidance     image stored in chart  Assessment  Injection assessment: negative aspiration for heme, no paresthesia on injection, incremental injection and local visualized surrounding nerve on ultrasound  Paresthesia pain: none  Heart rate change: no  Slow fractionated injection: yes  Additional Notes  Erector spinae plane block: Informed consent obtained.  Risks, benefits, and alternatives discussed.  ASA monitors placed, and timeout performed.  Patient positioned, prepped with chlorhexidine, and draped with sterile towels.  Ultrasound guidance used to visualize the erector spine a muscle above the TP/costal junction at T6 and T9. Superior costotransverse ligament visualized.  Good visualization of the needle throughout duration of the procedure.  Aspiration was negative.  10 cc of 0.5 percent ropivacaine injected with visualization of pleural movement downwards on US visualization  . Patient tolerated procedure well.    Timeout by Neha BEEBE

## 2023-11-10 NOTE — OP NOTE
Bilateral prophylactic mastectomy   Operative Note     Date: 11/10/2023  OR Location: [facility] OR    Name: Cheyanne Rubio, : 1970, Age: 53 y.o., MRN: [id number], Sex: female    Diagnosis  Pre-op Diagnosis     * Family history of malignant neoplasm of breast [Z80.3]     * Monoallelic mutation of PALB2 gene [Z15.01, Z15.89, Z15.09]     * Macromastia [N62] Post-op Diagnosis     * Family history of malignant neoplasm of breast [Z80.3]     * Monoallelic mutation of PALB2 gene [Z15.01, Z15.89, Z15.09]     * Macromastia [N62]     Procedures  Bilateral skin sparing mastectomy  CPT code 19303 x2    Surgeons   Panel 1:     * Crystal Gorman - Primary  Panel 2:     * Lynda Barahona - Primary    Resident/Fellow/Other Assistant:  Surgeon(s) and Role:  Panel 1:     * CHRIS ZavalaC - Assisting     * OTILIO Brown-CNP - Assisting     * Jaye Nicholas MD - Resident - Assisting  Panel 2:     * Guevara Rodriguez MD - Resident - Assisting    Procedure Summary  Anesthesia: General  ASA: I  Anesthesia Staff: Anesthesiologist: Domenic Velasco MD  CRNA: OTILIO Webber-CRNA  C-AA: SHRADDHA Dupree  Estimated Blood Loss: 100 mL  Intra-op Medications:   Medication Name Total Dose   papaverine injection 60 mg   lidocaine PF (Xylocaine) 10 mg/mL (1 %) injection 13 mL   heparin (porcine) 2,500 Units in sodium chloride 0.9 % 250 mL irrigation 2,500 Units   ketamine (Ketalar) 500 mg in dextrose 5 % in water (D5W) 250 mL (2 mg/mL) infusion 85.48 mg              Anesthesia Record               Intraprocedure I/O Totals          Intake    Dexmedetomidine 0.00 mL    The total shown is the total volume documented since Anesthesia Start was filed.    Ketamine 0.00 mL    The total shown is the total volume documented since Anesthesia Start was filed.    LR 1000.00 mL    Propofol Drip 0.00 mL    The total shown is the total volume documented since Anesthesia Start was filed.    Total Intake 1000 mL       Output    Urine 1775 mL     Est. Blood Loss 100 mL    Total Output 1875 mL       Net    Net Volume -875 mL          Specimen:   ID Type Source Tests Collected by Time   1 : RIGHT BREAST Tissue BREAST MASTECTOMY RIGHT SURGICAL PATHOLOGY EXAM Lynda Barahona MD 11/10/2023 1010   2 : LEFT BREAST Tissue BREAST MASTECTOMY LEFT SURGICAL PATHOLOGY EXAM Lynda Barahona MD 11/10/2023 1104        Staff:   Circulator: Arnold Keith RN  Relief Circulator: Chinedu Hutchinson RN  Relief Scrub: Светлана Castorena  Scrub Person: Ace Castillo RN; Pamela Hernández         Drains and/or Catheters:   Urethral Catheter Non-latex;Straight-tip 16 Fr. (Active)     INDICATIONS FOR PROCEDURE:    Cheyanne Rubio is a 53-year-old female with a strong family history of breast cancer, which prompted genetic testing; this was positive for a pathogenic PALB2 mutation. We discussed at length management options and she wished to proceed with bilateral prophylactic mastectomy. She met with Dr. Henderson in plastic surgery and elected immediate autologous reconstruction.  Following a detailed discussion regarding risks, benefits, and alternatives, informed consent was obtained, and we agreed to proceed.     DESCRIPTION OF PROCEDURE:    The patient was brought to the operating room and positioned supine on the OR table.  Following patient identification and a time-out procedure, SCDs were applied, and antibiotics were administered. General anesthesia was initiated. The operative field was prepped and draped in a standard sterile fashion.      We began with the right breast. An incision was made sharply along the skin sparing pattern marked by Dr. Henderson.  Dissection was carried down through the skin and subcutaneous tissues to identify the anterior mastectomy plane between the subcutaneous tissue and the anterior breast parenchyma. Mastectomy flaps were created in this plane superiorly up to the level of the clavicle, medially to the border of the sternum, inferiorly to the inframammary  fold, and laterally to the anterior border of the latissimus dorsi. The breast was then removed from the pectoralis muscle including the pectoralis fascia. The specimen was oriented with short stitch at 12 o'clock and a long stitch at the axillary tail. It was labeled as right breast and the weight was 912g, reflecting significant macromastia. It was sent to pathology for permanent section. The mastectomy site was copiously irrigated with warm sterile saline solution.  Hemostasis was achieved.     We then turned our attention to the left breast. 1% lidocaine was injected subcutaneously along the skin sparing pattern marked by Dr. Henderson.  The skin was incised sharply.  Dissection was carried down through the skin and subcutaneous tissues to identify the anterior mastectomy plane between the subcutaneous tissue and the anterior breast parenchyma. Mastectomy flaps were created in this plane superiorly up to the level of the clavicle, medially to the border of the sternum, inferiorly to the inframammary fold, and laterally to the anterior border of the latissimus dorsi. The breast was then removed from the pectoralis muscle including the pectoralis fascia. The specimen was oriented with short stitch at 12 o'clock and a long stitch at the axillary tail. It was labeled as left breast and the weight was 827g. It was sent to pathology for permanent section. The mastectomy site was copiously irrigated with warm sterile saline solution.  Hemostasis was achieved.     This concluded my portion of the procedure. The patient tolerated this well. There were no immediate complications.  All counts were correct.  Estimated blood loss was 100 cc. Dr. Henderson was present in the operating room and the patient remained in his care for the reconstructive portion of the procedure. Please see his separately dictated operative note for details. I was present for and performed the entirety of my procedure.      Attending Attestation: I  performed the procedure.    Lynda Barahona MD   Breast Surgical Oncology

## 2023-11-10 NOTE — ANESTHESIA PROCEDURE NOTES
Airway  Date/Time: 11/10/2023 8:12 AM  Urgency: elective    Airway not difficult    Staffing  Performed: SHRADDHA   Authorized by: Domenic Velasco MD    Performed by: SHRADDHA Dupree  Patient location during procedure: OR    Indications and Patient Condition  Indications for airway management: anesthesia and airway protection  Spontaneous ventilation: present  Sedation level: deep  Preoxygenated: yes  Patient position: sniffing  Mask difficulty assessment: 1 - vent by mask    Final Airway Details  Final airway type: endotracheal airway      Successful airway: ETT  Cuffed: yes   Successful intubation technique: direct laryngoscopy  Facilitating devices/methods: intubating stylet  Blade: Raymond  Blade size: #4  ETT size (mm): 7.0  Cormack-Lehane Classification: grade I - full view of glottis  Placement verified by: capnometry and palpation of cuff   Measured from: gums  ETT to gums (cm): 21  Number of attempts at approach: 1

## 2023-11-10 NOTE — ANESTHESIA PROCEDURE NOTES
Peripheral IV  Date/Time: 11/10/2023 8:20 AM  Inserted by: Domenic Velasco MD    Placement  Needle size: 16 G  Laterality: right  Location: forearm  Site prep: alcohol  Technique: anatomical landmarks  Attempts: 1

## 2023-11-10 NOTE — ANESTHESIA PREPROCEDURE EVALUATION
Patient: Cheyanne Rubio    Procedure Information       Date/Time: 11/10/23 0715    Procedures:       Bilateral cancer risk reduction mastectomies and immediate bilateral ALEKSANDR flap reconstruction, patient is position prone      Reconstruction Abdominal Wall      Creation Myocutaneous Flap Transverse Rectus Abdominis      Bilateral skin sparing mastectomy (Bilateral)    Location: [facility] OR 10 / [facility]    Surgeons: Crystal Gorman MD; Lynda Barahona MD            Relevant Problems   Anesthesia   (+) PONV (postoperative nausea and vomiting) (Severe motion sickness, severe PONV, severe opioid-induced nausea with codeine/oxycodone)      Cardiovascular (within normal limits)      Endocrine   (+) Enlarged thyroid      GI (within normal limits)      /Renal (within normal limits)      Neuro/Psych (within normal limits)      Pulmonary  Reactive bronchitis/reactive airways disease - uses inhalers prn with any acute URI   (+) Recent URI (URI 1 month ago, used inhaler during illness)      GI/Hepatic (within normal limits)      Hematology   (+) Anemia      Musculoskeletal (within normal limits)      Eyes, Ears, Nose, and Throat (within normal limits)      Infectious Disease (within normal limits)       Clinical information reviewed:   Tobacco  Allergies  Meds  Problems  Med Hx  Surg Hx  OB Status    Fam Hx  Soc Hx        NPO Detail:  NPO/Void Status  Carbonhydrate Drink Given Prior to Surgery? : N  Date of Last Liquid: 11/09/23  Time of Last Liquid: 0000  Date of Last Solid: 11/09/23  Time of Last Solid: 0000  Last Intake Type: Clear fluids  Time of Last Void: 0610         Physical Exam    Airway  Mallampati: II  TM distance: >3 FB  Neck ROM: full     Cardiovascular   Rhythm: regular  Rate: normal     Dental        Pulmonary   Breath sounds clear to auscultation  (-) rhonchi, wheezes  Rales: PERMANENT BRIDGE.   Abdominal   Abdomen: soft  Bowel sounds: normal           Anesthesia Plan    ASA 1     general    (Risks/benefits of GETA discussed, PONV prophylaxis discussed, two IV's, possible A line discussed)  The patient is not a current smoker.    intravenous induction   Postoperative administration of opioids is intended.  Trial extubation is planned.  Anesthetic plan and risks discussed with patient.  Use of blood products discussed with patient who.

## 2023-11-11 LAB
ABO GROUP (TYPE) IN BLOOD: NORMAL
ALBUMIN SERPL BCP-MCNC: 3.6 G/DL (ref 3.4–5)
ALBUMIN SERPL BCP-MCNC: 4 G/DL (ref 3.4–5)
ANION GAP SERPL CALC-SCNC: 13 MMOL/L (ref 10–20)
ANION GAP SERPL CALC-SCNC: 17 MMOL/L (ref 10–20)
ANTIBODY SCREEN: NORMAL
BUN SERPL-MCNC: 11 MG/DL (ref 6–23)
BUN SERPL-MCNC: 9 MG/DL (ref 6–23)
CALCIUM SERPL-MCNC: 8.4 MG/DL (ref 8.6–10.6)
CALCIUM SERPL-MCNC: 8.8 MG/DL (ref 8.6–10.6)
CHLORIDE SERPL-SCNC: 111 MMOL/L (ref 98–107)
CHLORIDE SERPL-SCNC: 112 MMOL/L (ref 98–107)
CO2 SERPL-SCNC: 21 MMOL/L (ref 21–32)
CO2 SERPL-SCNC: 26 MMOL/L (ref 21–32)
CREAT SERPL-MCNC: 0.71 MG/DL (ref 0.5–1.05)
CREAT SERPL-MCNC: 0.71 MG/DL (ref 0.5–1.05)
ERYTHROCYTE [DISTWIDTH] IN BLOOD BY AUTOMATED COUNT: 12.2 % (ref 11.5–14.5)
ERYTHROCYTE [DISTWIDTH] IN BLOOD BY AUTOMATED COUNT: 12.4 % (ref 11.5–14.5)
GFR SERPL CREATININE-BSD FRML MDRD: >90 ML/MIN/1.73M*2
GFR SERPL CREATININE-BSD FRML MDRD: >90 ML/MIN/1.73M*2
GLUCOSE SERPL-MCNC: 132 MG/DL (ref 74–99)
GLUCOSE SERPL-MCNC: 96 MG/DL (ref 74–99)
HCT VFR BLD AUTO: 27.9 % (ref 36–46)
HCT VFR BLD AUTO: 30.9 % (ref 36–46)
HGB BLD-MCNC: 10.6 G/DL (ref 12–16)
HGB BLD-MCNC: 9.8 G/DL (ref 12–16)
MAGNESIUM SERPL-MCNC: 1.73 MG/DL (ref 1.6–2.4)
MCH RBC QN AUTO: 30.9 PG (ref 26–34)
MCH RBC QN AUTO: 31.4 PG (ref 26–34)
MCHC RBC AUTO-ENTMCNC: 34.3 G/DL (ref 32–36)
MCHC RBC AUTO-ENTMCNC: 35.1 G/DL (ref 32–36)
MCV RBC AUTO: 88 FL (ref 80–100)
MCV RBC AUTO: 91 FL (ref 80–100)
NRBC BLD-RTO: 0 /100 WBCS (ref 0–0)
NRBC BLD-RTO: 0 /100 WBCS (ref 0–0)
PHOSPHATE SERPL-MCNC: 3.5 MG/DL (ref 2.5–4.9)
PHOSPHATE SERPL-MCNC: 3.5 MG/DL (ref 2.5–4.9)
PLATELET # BLD AUTO: 175 X10*3/UL (ref 150–450)
PLATELET # BLD AUTO: 192 X10*3/UL (ref 150–450)
POTASSIUM SERPL-SCNC: 3.6 MMOL/L (ref 3.5–5.3)
POTASSIUM SERPL-SCNC: 3.7 MMOL/L (ref 3.5–5.3)
RBC # BLD AUTO: 3.17 X10*6/UL (ref 4–5.2)
RBC # BLD AUTO: 3.38 X10*6/UL (ref 4–5.2)
RH FACTOR (ANTIGEN D): NORMAL
SODIUM SERPL-SCNC: 145 MMOL/L (ref 136–145)
SODIUM SERPL-SCNC: 147 MMOL/L (ref 136–145)
WBC # BLD AUTO: 12.6 X10*3/UL (ref 4.4–11.3)
WBC # BLD AUTO: 14 X10*3/UL (ref 4.4–11.3)

## 2023-11-11 PROCEDURE — 37799 UNLISTED PX VASCULAR SURGERY: CPT | Performed by: NURSE PRACTITIONER

## 2023-11-11 PROCEDURE — 2500000004 HC RX 250 GENERAL PHARMACY W/ HCPCS (ALT 636 FOR OP/ED)

## 2023-11-11 PROCEDURE — 2500000001 HC RX 250 WO HCPCS SELF ADMINISTERED DRUGS (ALT 637 FOR MEDICARE OP): Performed by: ANESTHESIOLOGY

## 2023-11-11 PROCEDURE — 2500000001 HC RX 250 WO HCPCS SELF ADMINISTERED DRUGS (ALT 637 FOR MEDICARE OP): Performed by: NURSE PRACTITIONER

## 2023-11-11 PROCEDURE — 85027 COMPLETE CBC AUTOMATED: CPT

## 2023-11-11 PROCEDURE — 36415 COLL VENOUS BLD VENIPUNCTURE: CPT

## 2023-11-11 PROCEDURE — 2500000004 HC RX 250 GENERAL PHARMACY W/ HCPCS (ALT 636 FOR OP/ED): Performed by: NURSE PRACTITIONER

## 2023-11-11 PROCEDURE — 96372 THER/PROPH/DIAG INJ SC/IM: CPT | Performed by: NURSE PRACTITIONER

## 2023-11-11 PROCEDURE — 86901 BLOOD TYPING SEROLOGIC RH(D): CPT

## 2023-11-11 PROCEDURE — 80069 RENAL FUNCTION PANEL: CPT | Performed by: NURSE PRACTITIONER

## 2023-11-11 PROCEDURE — 83735 ASSAY OF MAGNESIUM: CPT | Performed by: NURSE PRACTITIONER

## 2023-11-11 PROCEDURE — 2500000004 HC RX 250 GENERAL PHARMACY W/ HCPCS (ALT 636 FOR OP/ED): Performed by: STUDENT IN AN ORGANIZED HEALTH CARE EDUCATION/TRAINING PROGRAM

## 2023-11-11 PROCEDURE — 86920 COMPATIBILITY TEST SPIN: CPT

## 2023-11-11 PROCEDURE — 1170000001 HC PRIVATE ONCOLOGY ROOM DAILY

## 2023-11-11 PROCEDURE — 94760 N-INVAS EAR/PLS OXIMETRY 1: CPT

## 2023-11-11 PROCEDURE — 2500000005 HC RX 250 GENERAL PHARMACY W/O HCPCS: Performed by: STUDENT IN AN ORGANIZED HEALTH CARE EDUCATION/TRAINING PROGRAM

## 2023-11-11 PROCEDURE — 2500000004 HC RX 250 GENERAL PHARMACY W/ HCPCS (ALT 636 FOR OP/ED): Performed by: ANESTHESIOLOGY

## 2023-11-11 PROCEDURE — 99233 SBSQ HOSP IP/OBS HIGH 50: CPT | Performed by: PHYSICIAN ASSISTANT

## 2023-11-11 PROCEDURE — 85027 COMPLETE CBC AUTOMATED: CPT | Performed by: NURSE PRACTITIONER

## 2023-11-11 PROCEDURE — 80069 RENAL FUNCTION PANEL: CPT

## 2023-11-11 PROCEDURE — 99231 SBSQ HOSP IP/OBS SF/LOW 25: CPT

## 2023-11-11 RX ORDER — IBUPROFEN 400 MG/1
400 TABLET ORAL EVERY 8 HOURS PRN
COMMUNITY
End: 2023-12-11 | Stop reason: SDUPTHER

## 2023-11-11 RX ORDER — SODIUM CHLORIDE, SODIUM LACTATE, POTASSIUM CHLORIDE, CALCIUM CHLORIDE 600; 310; 30; 20 MG/100ML; MG/100ML; MG/100ML; MG/100ML
100 INJECTION, SOLUTION INTRAVENOUS CONTINUOUS
Status: DISCONTINUED | OUTPATIENT
Start: 2023-11-11 | End: 2023-11-11 | Stop reason: HOSPADM

## 2023-11-11 RX ORDER — MATRICARIA RECUTITA, PLANTAGO MAJOR, VERBASCUM THAPSUS, ATROPA BELLADONNA, CHILI PEPPER, OYSTER SHELL CALCIUM CARBONATE, CRUDE, AND POTASSIUM DICHROMATE 4; .3256; .3256; .3256; .3256; .3256; .3256 ML/30ML; ML/30ML; ML/30ML; ML/30ML; ML/30ML; ML/30ML; ML/30ML
4 LIQUID AURICULAR (OTIC) 2 TIMES DAILY PRN
COMMUNITY
End: 2023-11-20

## 2023-11-11 RX ORDER — DROPERIDOL 2.5 MG/ML
0.62 INJECTION, SOLUTION INTRAMUSCULAR; INTRAVENOUS ONCE AS NEEDED
Status: DISCONTINUED | OUTPATIENT
Start: 2023-11-11 | End: 2023-11-11 | Stop reason: HOSPADM

## 2023-11-11 RX ORDER — AZELASTINE 1 MG/ML
1 SPRAY, METERED NASAL 2 TIMES DAILY
COMMUNITY
End: 2023-11-27

## 2023-11-11 RX ORDER — FLUTICASONE PROPIONATE 50 MCG
1 SPRAY, SUSPENSION (ML) NASAL DAILY
COMMUNITY
End: 2023-11-27

## 2023-11-11 RX ORDER — HYDROMORPHONE HCL/0.9% NACL/PF 15 MG/30ML
PATIENT CONTROLLED ANALGESIA SYRINGE INTRAVENOUS CONTINUOUS
Status: DISCONTINUED | OUTPATIENT
Start: 2023-11-11 | End: 2023-11-14

## 2023-11-11 RX ORDER — AMITRIPTYLINE HYDROCHLORIDE 25 MG/1
25 TABLET, FILM COATED ORAL NIGHTLY
Status: DISCONTINUED | OUTPATIENT
Start: 2023-11-11 | End: 2023-11-16 | Stop reason: HOSPADM

## 2023-11-11 RX ORDER — NALOXONE HYDROCHLORIDE 0.4 MG/ML
0.2 INJECTION, SOLUTION INTRAMUSCULAR; INTRAVENOUS; SUBCUTANEOUS AS NEEDED
Status: DISCONTINUED | OUTPATIENT
Start: 2023-11-11 | End: 2023-11-16 | Stop reason: HOSPADM

## 2023-11-11 RX ORDER — PSYLLIUM HUSK 0.4 G
1 CAPSULE ORAL DAILY
COMMUNITY
End: 2024-03-22 | Stop reason: WASHOUT

## 2023-11-11 RX ORDER — PROCHLORPERAZINE 25 MG/1
25 SUPPOSITORY RECTAL EVERY 12 HOURS PRN
Status: DISCONTINUED | OUTPATIENT
Start: 2023-11-11 | End: 2023-11-11

## 2023-11-11 RX ORDER — PROCHLORPERAZINE EDISYLATE 5 MG/ML
10 INJECTION INTRAMUSCULAR; INTRAVENOUS EVERY 6 HOURS PRN
Status: DISCONTINUED | OUTPATIENT
Start: 2023-11-11 | End: 2023-11-11

## 2023-11-11 RX ORDER — CALCIUM CITRATE/VITAMIN D3 500MG-12.5
1 TABLET,CHEWABLE ORAL DAILY
Status: ON HOLD | COMMUNITY
End: 2024-03-28 | Stop reason: WASHOUT

## 2023-11-11 RX ORDER — PROCHLORPERAZINE MALEATE 10 MG
10 TABLET ORAL EVERY 6 HOURS PRN
Status: DISCONTINUED | OUTPATIENT
Start: 2023-11-11 | End: 2023-11-11

## 2023-11-11 RX ORDER — LIDOCAINE HYDROCHLORIDE 10 MG/ML
0.1 INJECTION, SOLUTION EPIDURAL; INFILTRATION; INTRACAUDAL; PERINEURAL ONCE
Status: DISCONTINUED | OUTPATIENT
Start: 2023-11-11 | End: 2023-11-11 | Stop reason: HOSPADM

## 2023-11-11 RX ORDER — MULTIVITAMIN/IRON/FOLIC ACID 18MG-0.4MG
1 TABLET ORAL DAILY
COMMUNITY
End: 2023-12-11

## 2023-11-11 RX ORDER — PROMETHAZINE HYDROCHLORIDE 50 MG/ML
12.5 INJECTION, SOLUTION INTRAMUSCULAR; INTRAVENOUS ONCE AS NEEDED
Status: DISCONTINUED | OUTPATIENT
Start: 2023-11-11 | End: 2023-11-11 | Stop reason: HOSPADM

## 2023-11-11 RX ADMIN — TRAMADOL HYDROCHLORIDE 100 MG: 50 TABLET, COATED ORAL at 15:54

## 2023-11-11 RX ADMIN — HYDROMORPHONE HYDROCHLORIDE 0.2 MG: 2 INJECTION, SOLUTION INTRAMUSCULAR; INTRAVENOUS; SUBCUTANEOUS at 03:12

## 2023-11-11 RX ADMIN — GABAPENTIN 600 MG: 300 CAPSULE ORAL at 17:59

## 2023-11-11 RX ADMIN — AMITRIPTYLINE HYDROCHLORIDE 25 MG: 25 TABLET, FILM COATED ORAL at 20:01

## 2023-11-11 RX ADMIN — HYDROMORPHONE HYDROCHLORIDE 0.2 MG: 2 INJECTION, SOLUTION INTRAMUSCULAR; INTRAVENOUS; SUBCUTANEOUS at 01:41

## 2023-11-11 RX ADMIN — DOCUSATE SODIUM 100 MG: 100 CAPSULE, LIQUID FILLED ORAL at 20:01

## 2023-11-11 RX ADMIN — ONDANSETRON 4 MG: 2 INJECTION INTRAMUSCULAR; INTRAVENOUS at 18:06

## 2023-11-11 RX ADMIN — Medication 3 L/MIN: at 01:57

## 2023-11-11 RX ADMIN — Medication: at 09:16

## 2023-11-11 RX ADMIN — SUGAMMADEX 200 MG: 100 INJECTION, SOLUTION INTRAVENOUS at 00:56

## 2023-11-11 RX ADMIN — ACETAMINOPHEN 650 MG: 325 TABLET ORAL at 17:59

## 2023-11-11 RX ADMIN — ACETAMINOPHEN 650 MG: 325 TABLET ORAL at 23:55

## 2023-11-11 RX ADMIN — ACETAMINOPHEN 650 MG: 325 TABLET ORAL at 11:55

## 2023-11-11 RX ADMIN — HYDROMORPHONE HYDROCHLORIDE 0.2 MG: 2 INJECTION, SOLUTION INTRAMUSCULAR; INTRAVENOUS; SUBCUTANEOUS at 01:50

## 2023-11-11 RX ADMIN — GABAPENTIN 600 MG: 300 CAPSULE ORAL at 06:36

## 2023-11-11 RX ADMIN — ACETAMINOPHEN 650 MG: 325 TABLET ORAL at 05:06

## 2023-11-11 RX ADMIN — HYDROMORPHONE HYDROCHLORIDE 0.2 MG: 2 INJECTION, SOLUTION INTRAMUSCULAR; INTRAVENOUS; SUBCUTANEOUS at 03:26

## 2023-11-11 RX ADMIN — METHOCARBAMOL 1000 MG: 100 INJECTION INTRAMUSCULAR; INTRAVENOUS at 20:01

## 2023-11-11 RX ADMIN — TRAMADOL HYDROCHLORIDE 100 MG: 50 TABLET, COATED ORAL at 21:57

## 2023-11-11 RX ADMIN — DOCUSATE SODIUM 100 MG: 100 CAPSULE, LIQUID FILLED ORAL at 08:24

## 2023-11-11 RX ADMIN — KETOROLAC TROMETHAMINE 30 MG: 30 INJECTION, SOLUTION INTRAMUSCULAR; INTRAVENOUS at 05:06

## 2023-11-11 RX ADMIN — CEFAZOLIN 2 G: 330 INJECTION, POWDER, FOR SOLUTION INTRAMUSCULAR; INTRAVENOUS at 00:13

## 2023-11-11 RX ADMIN — HYDROMORPHONE HYDROCHLORIDE 0.2 MG: 2 INJECTION, SOLUTION INTRAMUSCULAR; INTRAVENOUS; SUBCUTANEOUS at 03:20

## 2023-11-11 RX ADMIN — SODIUM CHLORIDE 100 ML/HR: 9 INJECTION, SOLUTION INTRAVENOUS at 20:04

## 2023-11-11 RX ADMIN — ASPIRIN 81 MG: 81 TABLET, COATED ORAL at 17:58

## 2023-11-11 RX ADMIN — HEPARIN SODIUM 5000 UNITS: 5000 INJECTION, SOLUTION INTRAVENOUS; SUBCUTANEOUS at 05:06

## 2023-11-11 RX ADMIN — METHOCARBAMOL 1000 MG: 100 INJECTION INTRAMUSCULAR; INTRAVENOUS at 12:00

## 2023-11-11 RX ADMIN — METHOCARBAMOL 1000 MG: 100 INJECTION INTRAMUSCULAR; INTRAVENOUS at 05:06

## 2023-11-11 RX ADMIN — HEPARIN SODIUM 5000 UNITS: 5000 INJECTION, SOLUTION INTRAVENOUS; SUBCUTANEOUS at 13:59

## 2023-11-11 RX ADMIN — TRAMADOL HYDROCHLORIDE 100 MG: 50 TABLET, COATED ORAL at 08:24

## 2023-11-11 RX ADMIN — TRAMADOL HYDROCHLORIDE 100 MG: 50 TABLET, COATED ORAL at 11:55

## 2023-11-11 SDOH — SOCIAL STABILITY: SOCIAL INSECURITY: ARE YOU OR HAVE YOU BEEN THREATENED OR ABUSED PHYSICALLY, EMOTIONALLY, OR SEXUALLY BY ANYONE?: NO

## 2023-11-11 SDOH — SOCIAL STABILITY: SOCIAL INSECURITY: DO YOU FEEL ANYONE HAS EXPLOITED OR TAKEN ADVANTAGE OF YOU FINANCIALLY OR OF YOUR PERSONAL PROPERTY?: NO

## 2023-11-11 SDOH — SOCIAL STABILITY: SOCIAL INSECURITY: HAS ANYONE EVER THREATENED TO HURT YOUR FAMILY OR YOUR PETS?: NO

## 2023-11-11 SDOH — SOCIAL STABILITY: SOCIAL INSECURITY: DO YOU FEEL UNSAFE GOING BACK TO THE PLACE WHERE YOU ARE LIVING?: NO

## 2023-11-11 SDOH — SOCIAL STABILITY: SOCIAL INSECURITY: ABUSE: ADULT

## 2023-11-11 SDOH — SOCIAL STABILITY: SOCIAL INSECURITY: HAVE YOU HAD THOUGHTS OF HARMING ANYONE ELSE?: NO

## 2023-11-11 SDOH — SOCIAL STABILITY: SOCIAL INSECURITY: WERE YOU ABLE TO COMPLETE ALL THE BEHAVIORAL HEALTH SCREENINGS?: YES

## 2023-11-11 SDOH — SOCIAL STABILITY: SOCIAL INSECURITY: DOES ANYONE TRY TO KEEP YOU FROM HAVING/CONTACTING OTHER FRIENDS OR DOING THINGS OUTSIDE YOUR HOME?: NO

## 2023-11-11 SDOH — SOCIAL STABILITY: SOCIAL INSECURITY: ARE THERE ANY APPARENT SIGNS OF INJURIES/BEHAVIORS THAT COULD BE RELATED TO ABUSE/NEGLECT?: NO

## 2023-11-11 ASSESSMENT — ACTIVITIES OF DAILY LIVING (ADL)
LACK_OF_TRANSPORTATION: NO
BATHING: INDEPENDENT
DRESSING YOURSELF: INDEPENDENT
HEARING - RIGHT EAR: FUNCTIONAL
PATIENT'S MEMORY ADEQUATE TO SAFELY COMPLETE DAILY ACTIVITIES?: YES
HEARING - LEFT EAR: FUNCTIONAL
WALKS IN HOME: INDEPENDENT
LACK_OF_TRANSPORTATION: NO
GROOMING: INDEPENDENT
TOILETING: INDEPENDENT
ADEQUATE_TO_COMPLETE_ADL: YES
JUDGMENT_ADEQUATE_SAFELY_COMPLETE_DAILY_ACTIVITIES: YES
FEEDING YOURSELF: INDEPENDENT

## 2023-11-11 ASSESSMENT — COGNITIVE AND FUNCTIONAL STATUS - GENERAL
STANDING UP FROM CHAIR USING ARMS: A LITTLE
DRESSING REGULAR LOWER BODY CLOTHING: A LITTLE
MOBILITY SCORE: 12
CLIMB 3 TO 5 STEPS WITH RAILING: A LOT
DRESSING REGULAR UPPER BODY CLOTHING: A LITTLE
DAILY ACTIVITIY SCORE: 12
EATING MEALS: A LITTLE
PATIENT BASELINE BEDBOUND: NO
WALKING IN HOSPITAL ROOM: A LITTLE
TOILETING: A LITTLE
TOILETING: A LOT
PERSONAL GROOMING: A LITTLE
MOVING TO AND FROM BED TO CHAIR: A LOT
EATING MEALS: A LOT
HELP NEEDED FOR BATHING: A LOT
STANDING UP FROM CHAIR USING ARMS: A LOT
HELP NEEDED FOR BATHING: A LITTLE
DRESSING REGULAR LOWER BODY CLOTHING: A LOT
DRESSING REGULAR UPPER BODY CLOTHING: A LOT
DAILY ACTIVITIY SCORE: 18
MOVING TO AND FROM BED TO CHAIR: A LITTLE
MOBILITY SCORE: 18
PERSONAL GROOMING: A LOT
TURNING FROM BACK TO SIDE WHILE IN FLAT BAD: A LOT
TURNING FROM BACK TO SIDE WHILE IN FLAT BAD: A LITTLE
WALKING IN HOSPITAL ROOM: A LOT
MOVING FROM LYING ON BACK TO SITTING ON SIDE OF FLAT BED WITH BEDRAILS: A LITTLE
CLIMB 3 TO 5 STEPS WITH RAILING: A LITTLE
MOVING FROM LYING ON BACK TO SITTING ON SIDE OF FLAT BED WITH BEDRAILS: A LOT

## 2023-11-11 ASSESSMENT — PAIN SCALES - GENERAL
PAINLEVEL_OUTOF10: 7
PAINLEVEL_OUTOF10: 3
PAINLEVEL_OUTOF10: 8
PAINLEVEL_OUTOF10: 8
PAINLEVEL_OUTOF10: 3
PAINLEVEL_OUTOF10: 3
PAINLEVEL_OUTOF10: 10 - WORST POSSIBLE PAIN
PAINLEVEL_OUTOF10: 8
PAINLEVEL_OUTOF10: 7
PAINLEVEL_OUTOF10: 6
PAINLEVEL_OUTOF10: 7
PAINLEVEL_OUTOF10: 8
PAINLEVEL_OUTOF10: 0 - NO PAIN
PAINLEVEL_OUTOF10: 8
PAINLEVEL_OUTOF10: 8
PAINLEVEL_OUTOF10: 9
PAINLEVEL_OUTOF10: 7
PAINLEVEL_OUTOF10: 8

## 2023-11-11 ASSESSMENT — PAIN DESCRIPTION - LOCATION
LOCATION: BREAST
LOCATION: BREAST

## 2023-11-11 ASSESSMENT — PAIN - FUNCTIONAL ASSESSMENT

## 2023-11-11 ASSESSMENT — LIFESTYLE VARIABLES
HOW OFTEN DO YOU HAVE A DRINK CONTAINING ALCOHOL: MONTHLY OR LESS
SKIP TO QUESTIONS 9-10: 1
HOW MANY STANDARD DRINKS CONTAINING ALCOHOL DO YOU HAVE ON A TYPICAL DAY: PATIENT DOES NOT DRINK
AUDIT-C TOTAL SCORE: 1
HOW OFTEN DO YOU HAVE 6 OR MORE DRINKS ON ONE OCCASION: NEVER
AUDIT-C TOTAL SCORE: 1

## 2023-11-11 ASSESSMENT — PATIENT HEALTH QUESTIONNAIRE - PHQ9
2. FEELING DOWN, DEPRESSED OR HOPELESS: NOT AT ALL
1. LITTLE INTEREST OR PLEASURE IN DOING THINGS: NOT AT ALL
SUM OF ALL RESPONSES TO PHQ9 QUESTIONS 1 & 2: 0

## 2023-11-11 ASSESSMENT — PAIN DESCRIPTION - ORIENTATION: ORIENTATION: RIGHT;LEFT

## 2023-11-11 ASSESSMENT — PAIN DESCRIPTION - DESCRIPTORS: DESCRIPTORS: DISCOMFORT

## 2023-11-11 NOTE — PROGRESS NOTES
"Cheyanne Rubio is a 53 y.o. female on day 1 of admission presenting with Monoallelic mutation of PALB2 gene.    Subjective   OR finished early this morning. Seen on morning rounds. Pain moderately well controlled. Complaining of some bilateral arm numbness and heaviness but with full range of motion.        Objective   Constitutional: Awake, alert, no acute distress  Neurological: Numbness in bilateral upper extremities. Able to wiggle fingers.  Eyes: Non-icteric  Respiratory: No increased work of breathing on 2L NC  Cardiac: Regular rate  Chest: Bilateral ALEKSANDR flaps soft, warm, some ecchymosis. Bilateral ROBBY drains with serosanguinous drainage  Abdomen: Soft, nontender, nondistended. Prevena vac in place holding suction.   Genitourinary: Parker in place draining clear yellow urine  Skin: Warm, dry  Musculoskeletal: Moves all extremities  Extremities: No peripheral edema   Psychological: Appropriate mood/affect    Last Recorded Vitals  Blood pressure 102/69, pulse 68, temperature 35.7 °C (96.3 °F), temperature source Temporal, resp. rate 16, height 1.702 m (5' 7.01\"), weight 90.3 kg (199 lb 1.2 oz), SpO2 96 %.  Intake/Output last 3 Shifts:  I/O last 3 completed shifts:  In: 5950 (65.9 mL/kg) [I.V.:1150 (12.7 mL/kg); IV Piggyback:4800]  Out: 4800 (53.2 mL/kg) [Urine:4585 (1.4 mL/kg/hr); Drains:65; Blood:150]  Weight: 90.3 kg     Relevant Results  Results for orders placed or performed during the hospital encounter of 11/10/23 (from the past 24 hour(s))   Blood Gas Arterial Full Panel   Result Value Ref Range    POCT pH, Arterial 7.34 (L) 7.38 - 7.42 pH    POCT pCO2, Arterial 44 (H) 38 - 42 mm Hg    POCT pO2, Arterial 137 (H) 85 - 95 mm Hg    POCT SO2, Arterial 97 94 - 100 %    POCT Oxy Hemoglobin, Arterial 97.0 94.0 - 98.0 %    POCT Hematocrit Calculated, Arterial 39.0 36.0 - 46.0 %    POCT Sodium, Arterial 140 136 - 145 mmol/L    POCT Potassium, Arterial 4.1 3.5 - 5.3 mmol/L    POCT Chloride, Arterial 109 (H) 98 - 107 " mmol/L    POCT Ionized Calcium, Arterial 1.24 1.10 - 1.33 mmol/L    POCT Glucose, Arterial 186 (H) 74 - 99 mg/dL    POCT Lactate, Arterial 1.3 0.4 - 2.0 mmol/L    POCT Base Excess, Arterial -2.2 (L) -2.0 - 3.0 mmol/L    POCT HCO3 Calculated, Arterial 23.7 22.0 - 26.0 mmol/L    POCT Hemoglobin, Arterial 13.0 12.0 - 16.0 g/dL    POCT Anion Gap, Arterial 11 10 - 25 mmo/L    Patient Temperature 37.0 degrees Celsius    FiO2 50 %   CBC   Result Value Ref Range    WBC 14.0 (H) 4.4 - 11.3 x10*3/uL    nRBC 0.0 0.0 - 0.0 /100 WBCs    RBC 3.38 (L) 4.00 - 5.20 x10*6/uL    Hemoglobin 10.6 (L) 12.0 - 16.0 g/dL    Hematocrit 30.9 (L) 36.0 - 46.0 %    MCV 91 80 - 100 fL    MCH 31.4 26.0 - 34.0 pg    MCHC 34.3 32.0 - 36.0 g/dL    RDW 12.2 11.5 - 14.5 %    Platelets 175 150 - 450 x10*3/uL   Renal function panel   Result Value Ref Range    Glucose 132 (H) 74 - 99 mg/dL    Sodium 147 (H) 136 - 145 mmol/L    Potassium 3.7 3.5 - 5.3 mmol/L    Chloride 112 (H) 98 - 107 mmol/L    Bicarbonate 26 21 - 32 mmol/L    Anion Gap 13 10 - 20 mmol/L    Urea Nitrogen 9 6 - 23 mg/dL    Creatinine 0.71 0.50 - 1.05 mg/dL    eGFR >90 >60 mL/min/1.73m*2    Calcium 8.8 8.6 - 10.6 mg/dL    Phosphorus 3.5 2.5 - 4.9 mg/dL    Albumin 4.0 3.4 - 5.0 g/dL   Magnesium   Result Value Ref Range    Magnesium 1.73 1.60 - 2.40 mg/dL   CBC   Result Value Ref Range    WBC 12.6 (H) 4.4 - 11.3 x10*3/uL    nRBC 0.0 0.0 - 0.0 /100 WBCs    RBC 3.17 (L) 4.00 - 5.20 x10*6/uL    Hemoglobin 9.8 (L) 12.0 - 16.0 g/dL    Hematocrit 27.9 (L) 36.0 - 46.0 %    MCV 88 80 - 100 fL    MCH 30.9 26.0 - 34.0 pg    MCHC 35.1 32.0 - 36.0 g/dL    RDW 12.4 11.5 - 14.5 %    Platelets 192 150 - 450 x10*3/uL     Assessment/Plan   Principal Problem:    Monoallelic mutation of PALB2 gene  Active Problems:    Status post breast reconstruction    PONV (postoperative nausea and vomiting)    Recent URI    Cheyanne Rubio is a 53 year old F s/p bilateral risk reducing mastectomy with Dr. Barahona and bilateral  ALEKSANDR reconstruction with Dr. Gorman for PALB2 mutation on 11/10. Patient is recovering well post operatively.     - Plastics primary  - Pain management, drains, wound care per Plastics    Discussed with attending surgeon, Dr. Barahona.     Letty Tijerina MD  PGY3   General Surgery   Breast Surgery pager [phone]

## 2023-11-11 NOTE — ANESTHESIA POSTPROCEDURE EVALUATION
Patient: Cheyanne Rubio    Procedure Summary       Date: 11/10/23 Room / Location: [facility] OR 10 / [facility]    Anesthesia Start: 0800 Anesthesia Stop: 11/11/23 0120    Procedures:       Creation Myocutaneous Flap Transverse Rectus Abdominis for immediate reconstruction, BILATERAL Breast (Bilateral: Breast)      Reconstruction Abdominal Wall (Bilateral: Breast)      Bilateral skin sparing mastectomy (Bilateral: Breast) Diagnosis:       Family history of malignant neoplasm of breast      Monoallelic mutation of PALB2 gene      Macromastia      (Family history of malignant neoplasm of breast [Z80.3])    Surgeons: Crystal Gorman MD; Lynda Barahona MD Responsible Provider: Domenic Velasco MD    Anesthesia Type: general ASA Status: 1            Anesthesia Type: general    Vitals Value Taken Time   /70 11/11/23 0122   Temp 36 11/11/23 0128   Pulse 55 11/11/23 0127   Resp 16 11/11/23 0122   SpO2 100 % 11/11/23 0127   Vitals shown include unvalidated device data.    Anesthesia Post Evaluation    Patient location during evaluation: PACU  Patient participation: complete - patient participated  Level of consciousness: sleepy but conscious  Pain management: adequate  Airway patency: patent  Cardiovascular status: acceptable  Respiratory status: acceptable  Hydration status: acceptable  Comments: No nausea      No symptoms of PONV noted.    There were no known notable events for this encounter.

## 2023-11-11 NOTE — ED NOTES
Pharmacy Medication History Review    Cheyanne Rubio is a 53 y.o. female admitted for Monoallelic mutation of PALB2 gene. Pharmacy reviewed the patient's [id number] medications and allergies for accuracy.    The list below reflects the updated PTA list. Comments regarding how patient may be taking medications differently can be found in the Admit Orders Activity  Prior to Admission Medications   Prescriptions Last Dose Informant   albuterol (ProAir HFA) 90 mcg/actuation inhaler Past Week Self, Spouse/Significant Other   Sig: Inhale 2 puffs 4 times a day as needed for shortness of breath.   azelastine (Astelin) 137 mcg (0.1 %) nasal spray More than a month Self, Spouse/Significant Other   Sig: Administer 1 spray into each nostril 2 times a day. Use in each nostril as directed   b complex 0.4 mg tablet 11/10/2023 Self, Spouse/Significant Other   Sig: Take 1 tablet by mouth once daily.   brimonidine 0.025 % drops 11/10/2023 Self, Spouse/Significant Other   Sig: Administer 1 drop into affected eye(s) once daily.   calcium citrate-vitamin D3 500 mg-12.5 mcg (500 unit) tablet,chewable 11/10/2023 Self, Spouse/Significant Other   Sig: Chew 1 tablet once daily.   fexofenadine (Allegra) 180 mg tablet Past Week Self, Spouse/Significant Other   Sig: Take 1 tablet (180 mg) by mouth once daily.   fluticasone (Flonase) 50 mcg/actuation nasal spray Past Week Self, Spouse/Significant Other   Sig: Administer 1 spray into each nostril once daily. Shake gently. Before first use, prime pump. After use, clean tip and replace cap.   ibuprofen 400 mg tablet Past Month Self, Spouse/Significant Other   Sig: Take 1 tablet (400 mg) by mouth every 8 hours if needed for headaches (onset of migrane).   isopropyl alcohoL in glycerin (Auro DRI Swimmers' Ear) 95-5 % otic solution More than a month Self, Spouse/Significant Other   Sig: Administer 4 drops into each ear 2 times a day as needed for pain.   psyllium (Metamucil) 0.4 gram capsule  11/10/2023 Self, Spouse/Significant Other   Sig: Take 1 capsule by mouth once daily.      Facility-Administered Medications: None     The list below reflects the updated allergy list. Please review each documented allergy for additional clarification and justification.  Allergies   Allergen Reactions    Oxycodone Confusion    Codeine Rash and Nausea/vomiting         Patient accepts M2B at discharge. Pharmacy has been updated to [facility].    Sources used to complete the med history include dispense history, OARRS, patient and family interview. Patient and family members are reliable historian, could recall all of the medications, do    - No concerns found with this patient med list.   - Of note, patient is vegan and requested to have medication without animal products.     Radha Oliveira, PharmD   Meds PGY1 Pharmacy Resident    Meds Ambulatory and Retail Services  Please reach out via [employer] Secure Chat for questions, or if no response call [phone] or [employer] “MedRec”

## 2023-11-11 NOTE — SIGNIFICANT EVENT
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 She is s/p Bilateral Mastectomy and Bilateral ALEKSANDR reconstruction on 11/10/23 with Dr. Barahona and Dr. Gorman.     Patient evaluated in PACU. Asleep on exam.     Physical Exam:   Constitutional: NAD  Eyes: Clear sclera   ENMT: Moist mucous membranes, no apparent injuries or lesions  Head/Neck: NCAT  Cardiovascular: RRR  Respiratory/Thorax: Unlabored respirations on supplemental oxygen  Gastrointestinal: Abdomen soft, non-distended. Prevena vac in place, holding suction at -125 mmHg, no leaks or obstruction, umbilical region covered with xeroform and Aquacel Ag.   Genitourinary: Indwelling palomino in place with clear yellow output   Neurological: Asleep  Skin: Warm and dry with no lesions or rashes  Breast: Bilateral ALEKSANDR free flap recipient site soft, warm to touch, diffuse purple bruising throughout central part of flap bilaterally. Linear skin tear to R IMF fold medially. Flap approximated with intact sutures and incision lines dressed with Xeroform dressing, no evidence of dehiscence. Intact doppler pulses at the upper, lateral corners near the sternum bilaterally. x1 ROBBY drain to each breast with sanguinous output.     Assessment/plan:  # S/p ALEKSANDR free flap reconstruction, complex closure, application wound vac    Flap surveillance:   - Continue serial flap assessments q1h per nursing and q2h per plastic surgery team         -> Assess clinical appearance of flap (color, warmth, turgor)         -> Nursing to notify plastic surgery team immediately if there are any acute changes             (Including pallor, temperature change, bleeding, etc.)      - Monitor surgical sites (including inferior incision lines) for s/s of bleeding, infection or  dehiscence    Warming:       - Use of Cas hugger to breast flap region and surrounding skin            · Settings: low heat (36*C), medium continuous fan           · 5 hours on, 1 hour off x 3 days postoperatively           · Apply ABDs loosely over flap then Cas Hugger blanket             (To avoid direct contact of warmer with skin/flap)   Dressings/wound care:       - Daily local wound care/dressing changes per plastics          · Xeroform strips to ALEKSANDR flap incision lines at breast changed daily and PRN if soiled            · Xeroform over umbilical incision covered by Tegaderm dressing             (to remain in place likely until vac removal)   Drains: (x1 ROBBY to L breast, x1 ROBBY to R breast)        · Strip drain tubing TID and PRN        · Monitor and record output q8h         · Keep drain sites C/D/I         · Call plastics if drain output is >30cc in an hour or greater than 200cc over 8 hours       - Will f/u timing of drain removal pending output quantities and postoperative course    Vac therapy:       - Continue incisional wound vac therapy to Pfannenstiel incision at lower abdomen           · Settings: 125 mmHg low continuous suction          · Please keep dressing C/D/I, reinforce PRN          · Please alert plastics team with any concerns regarding vac          · Transition to prevena upon DC   Positioning:       - Patient to maintain beach chair positioning at all times       - Head elevated and hips flexed at least 30 degrees, knees bent and elevated on pillows       - Avoid positioning that would apply pressure to flap region or incisions    Weight bearing:       - Patient to maintain strict bedrest postoperatively x 3 days      - OK to keep palomino until off bedrest   Other postoperative parameters/care:       - IV Ancef x3 doses postoperatively       - Maintain Palomino while on bedrest       - Urine output goal of >1 mL/kg/hr      - Follow up post op CBC/RFP      - BP goal of 110/60 minimum to ensure  adequate flap perfusion       - SQ Heparin q8h scheduled to start POD1 0600, transition to Lovenox on POD #3        - Daily ASA to start on POD #1 then continue for x30 days postoperatively       - Continue to record and monitor strict I&Os      - Continue VS monitoring      - Encouraged use of IS (10x/hr, each hr while awake)      # Acute postoperative pain  - Scheduled Tylenol 650mg PO q6h mild pain   - Toradol 30mg q6h  - Tramadol 50mg-100mg PO q4h PRN mod-severe pain  - Dilaudid 0.2mg q4 PRN breakthrough pain  - Ropivacaine pain catheters x2 per anesthesia   - Scheduled Gabapentin 600mg q12h  - Scheduled Robaxin 1,000mg PO q6h     # Acute postoperative anemia   - Incoming/baseline HGB ~13 (as of recent labs 11/6/2023)  - Postop HGB down to 10.6 (post-op 11/11)   - Ensure T&S active  - Transfuse for HGB <9 or as indicated to ensure adequate flap perfusion  - Monitor for S/S of bleeding or symptomatic anemia   - Monitor AM CBC      F: 0.9% NS @ 100ml/hr while NPO  E: Replete PRN  N: NPO until re-assessment on POD #1  GI: Continue bowel regimen with Colace BID         IV Zofran PRN for nausea    DVT ppx:  - ASA 81 to start POD#1 then continue once daily x30 days postoperatively   - SQ heparin TID x 3 days then transition to SQ Lovenox on POD3  - Use of SCDs while on bedrest       Disposition: Continue care on RNF. Will remain inpatient for continued flap monitoring and vac/drain surveillance postoperatively.    Regina Javed PA-C  Plastic and Reconstructive Surgery  Epic chat, Pager [phone], Team phones: [phone]

## 2023-11-11 NOTE — PROGRESS NOTES
"Plastic Surgery Progress Note    Patient Name: Cheyanne Rubio  MRN: [id number]  Date:  11/11/23     Subjective  Pt resting in bed with  at bedside. Endorses pain to abdominal region s/p transition to PCA per acute pain. Minimal pain to bilateral breasts. Denies any fever, chills, night sweats, CP, SOB, palpitations, nausea, vomiting, or abdominal pain/discomfort.     Objective    Vital Signs  /69 (BP Location: Right arm, Patient Position: Lying)   Pulse 68   Temp 35.7 °C (96.3 °F) (Temporal)   Resp 16   Ht 1.702 m (5' 7.01\")   Wt 90.3 kg (199 lb 1.2 oz)   SpO2 96%   BMI 31.17 kg/m²      Physical Exam  Constitutional: NAD.   Eyes: Clear sclera. EOMI.   ENMT: Moist mucous membranes, no apparent injuries or lesions.  Head/Neck: NCAT.  Cardiovascular: RRR.  Respiratory/Thorax: Unlabored respirations on RA. Symmetric chest rise.   Gastrointestinal: Abdomen soft, non-distended. Prevena vac in place, holding suction at -125 mmHg, no leaks or obstruction, umbilical region covered with xeroform and Aquacel Ag.  Genitourinary: Indwelling palomino in place with clear yellow output.   Neurological: A&Ox3.   Skin: Warm and dry with no lesions or rashes.   Breast: Bilateral ALEKSANDR free flap recipient site soft, warm to touch, diffuse purple bruising throughout central part of native breast tissue which has remained stable. Linear skin tear to R IMF fold medially. Flap approximated with intact sutures and incision lines dressed with Xeroform dressing, no evidence of dehiscence. Intact doppler pulses at the upper, lateral corners near the sternum bilaterally. x1 ROBBY drain to each breast with sanguinous output.     Diagnostics   Results for orders placed or performed during the hospital encounter of 11/10/23 (from the past 24 hour(s))   Blood Gas Arterial Full Panel   Result Value Ref Range    POCT pH, Arterial 7.34 (L) 7.38 - 7.42 pH    POCT pCO2, Arterial 44 (H) 38 - 42 mm Hg    POCT pO2, Arterial 137 (H) 85 - 95 mm Hg    " POCT SO2, Arterial 97 94 - 100 %    POCT Oxy Hemoglobin, Arterial 97.0 94.0 - 98.0 %    POCT Hematocrit Calculated, Arterial 39.0 36.0 - 46.0 %    POCT Sodium, Arterial 140 136 - 145 mmol/L    POCT Potassium, Arterial 4.1 3.5 - 5.3 mmol/L    POCT Chloride, Arterial 109 (H) 98 - 107 mmol/L    POCT Ionized Calcium, Arterial 1.24 1.10 - 1.33 mmol/L    POCT Glucose, Arterial 186 (H) 74 - 99 mg/dL    POCT Lactate, Arterial 1.3 0.4 - 2.0 mmol/L    POCT Base Excess, Arterial -2.2 (L) -2.0 - 3.0 mmol/L    POCT HCO3 Calculated, Arterial 23.7 22.0 - 26.0 mmol/L    POCT Hemoglobin, Arterial 13.0 12.0 - 16.0 g/dL    POCT Anion Gap, Arterial 11 10 - 25 mmo/L    Patient Temperature 37.0 degrees Celsius    FiO2 50 %   CBC   Result Value Ref Range    WBC 14.0 (H) 4.4 - 11.3 x10*3/uL    nRBC 0.0 0.0 - 0.0 /100 WBCs    RBC 3.38 (L) 4.00 - 5.20 x10*6/uL    Hemoglobin 10.6 (L) 12.0 - 16.0 g/dL    Hematocrit 30.9 (L) 36.0 - 46.0 %    MCV 91 80 - 100 fL    MCH 31.4 26.0 - 34.0 pg    MCHC 34.3 32.0 - 36.0 g/dL    RDW 12.2 11.5 - 14.5 %    Platelets 175 150 - 450 x10*3/uL   Renal function panel   Result Value Ref Range    Glucose 132 (H) 74 - 99 mg/dL    Sodium 147 (H) 136 - 145 mmol/L    Potassium 3.7 3.5 - 5.3 mmol/L    Chloride 112 (H) 98 - 107 mmol/L    Bicarbonate 26 21 - 32 mmol/L    Anion Gap 13 10 - 20 mmol/L    Urea Nitrogen 9 6 - 23 mg/dL    Creatinine 0.71 0.50 - 1.05 mg/dL    eGFR >90 >60 mL/min/1.73m*2    Calcium 8.8 8.6 - 10.6 mg/dL    Phosphorus 3.5 2.5 - 4.9 mg/dL    Albumin 4.0 3.4 - 5.0 g/dL   Magnesium   Result Value Ref Range    Magnesium 1.73 1.60 - 2.40 mg/dL   CBC   Result Value Ref Range    WBC 12.6 (H) 4.4 - 11.3 x10*3/uL    nRBC 0.0 0.0 - 0.0 /100 WBCs    RBC 3.17 (L) 4.00 - 5.20 x10*6/uL    Hemoglobin 9.8 (L) 12.0 - 16.0 g/dL    Hematocrit 27.9 (L) 36.0 - 46.0 %    MCV 88 80 - 100 fL    MCH 30.9 26.0 - 34.0 pg    MCHC 35.1 32.0 - 36.0 g/dL    RDW 12.4 11.5 - 14.5 %    Platelets 192 150 - 450 x10*3/uL   Renal  function panel   Result Value Ref Range    Glucose 96 74 - 99 mg/dL    Sodium 145 136 - 145 mmol/L    Potassium 3.6 3.5 - 5.3 mmol/L    Chloride 111 (H) 98 - 107 mmol/L    Bicarbonate 21 21 - 32 mmol/L    Anion Gap 17 10 - 20 mmol/L    Urea Nitrogen 11 6 - 23 mg/dL    Creatinine 0.71 0.50 - 1.05 mg/dL    eGFR >90 >60 mL/min/1.73m*2    Calcium 8.4 (L) 8.6 - 10.6 mg/dL    Phosphorus 3.5 2.5 - 4.9 mg/dL    Albumin 3.6 3.4 - 5.0 g/dL     Current Medications  Scheduled medications  acetaminophen, 650 mg, oral, q6h ADY  amitriptyline, 25 mg, oral, Nightly  aspirin, 81 mg, oral, Daily  docusate sodium, 100 mg, oral, BID  gabapentin, 600 mg, oral, q12h  heparin (porcine), 5,000 Units, subcutaneous, q8h  methocarbamol, 1,000 mg, intravenous, q8h    Continuous medications  HYDROmorphone,   ropivacaine (PF) in NS cmpd, 6 mL/hr, Last Rate: 12 mL/hr (11/10/23 1505)  ropivacaine (PF) in NS cmpd, 6 mL/hr  sodium chloride 0.9%, 100 mL/hr    PRN medications  PRN medications: diphenhydrAMINE, HYDROmorphone, magnesium hydroxide, naloxone, naloxone, ondansetron **OR** ondansetron, prochlorperazine **OR** prochlorperazine **OR** prochlorperazine, traMADol, traMADol     Assessment/Plan  Cheyanne is a 53 year old female with a past medical history of endometriosis, thyroid nodule, vitamin D deficiency, uterine polyp and 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 Barahona of breast surgery and Dr. Gorman of plastic surgery for bilateral cancer risk reduction mastectomies followed by immediate bilateral ALEKSANDR free flap reconstruction on 11/20/2023.     # S/p ALEKSANDR free flap reconstruction, complex closure, application wound vac   - Continue serial flap assessments Q1hour per nursing       ·  Assess clinical appearance of flap (color, warmth, turgor)       ·  Nursing to notify plastic surgery team immediately if there are any acute changes          (Including pallor, temperature change, bleeding, etc.)  - Continue  use of Cas hugger to breast flap region and surrounding skin         ·  Settings: low heat (36*C), medium continuous fan        ·  5 hours on, 1 hour off x 3 days postoperatively (end date PM 11/13)        ·  Apply ABDs loosely over flap then Cas Hugger blanket to avoid direct contact of warmer with skin/flap  - Daily local wound care/dressing changes per plastics       ·  Xeroform strips to ALEKSANDR flap incision lines at breast changed daily and PRN if soiled          ·  Xeroform over umbilical incision covered by Tegaderm dressing to remain in place until vac removal       ·  Monitor surgical sites (including inferior incision lines) for s/s of bleeding, infection or dehiscence   - Continue ROBBY drain care per nursing (x1 to R breast, x1 to L breast)       ·  Empty and record output at least every 8 hours       ·  Strip drains TID and PRN to avoid drain obstruction        ·  Removal per plastic surgery team when output <30cc output/24hrs for x2 consecutive days       ·  Call plastics if drain output is >30cc in an hour or greater than 200cc over 8 hours  - Continue incisional wound vac therapy to Pfannenstiel incision at lower abdomen x14 days post op (end date ~11/24)        ·  Settings: 125 mmHg low continuous suction        ·  Please keep dressing C/D/I, reinforce PRN        ·  Please alert plastics team with any concerns regarding vac        ·  Transition to prevena upon DC  - Maintain beach chair positioning at all times        ·  Head elevated and hips flexed at least 60 degrees, knees bent and elevated on pillows   - Avoid positioning that would apply pressure to flap region or incisions   - Patient to maintain strict bedrest postoperatively x 3 days (end date 11/13)        ·  Maintain palomino catheter until off bedrest  - Continue to record and monitor strict I&Os  - S/p x3 doses IV ancef postoperatively   - BP goal of 110/60 minimum to ensure adequate flap perfusion   - SQ Heparin [id number], transition to  Lovenox on POD #3 (PM 11/13)   - Daily ASA to start PM POD #1 then continue for x30 days postoperatively (end date 12/11)   - Encourage IS x10 every hour while awake   - Monitor VS/pulse ox [id number]   - Monitor AM CBC/RFP/Mg      # Acute postoperative pain  - Current regimen       ·  Tylenol 650mg PO [id number]        ·  Tramadol 50/100mg PO [id number] PRN moderate/severe pain       ·  Gabapentin 600mg BID        ·  Robaxin 1000mg IV [id number]        ·  Dilaudid PCA as recommended by acute pain service, plan to wean in next 1-2 days   - Acute pain following, appreciate recs        ·  Ropivacaine pain catheters x2 per anesthesia        ·  Amitriptyline 25mg nightly   - Bowel regimen: colace 100mg BID, Milk of Mg PRN for constipation while taking narcotics   - Benadryl 25mg IV PRN itching while taking narcotics   - Pain assessments every hour while on PCA      # Acute postoperative anemia   - Incoming/baseline HGB ~13 (as of recent labs 11/6/2023)  - AM Hbg 11/11 slightly downtrended 9.8, no indication for transfusion at this time   - Ensure T&S active, last updated 11/11, next due 11/14   - Transfuse for HGB <9 or as indicated to ensure adequate flap perfusion  - Monitor for S/S of bleeding or symptomatic anemia   - Monitor AM CBC       F: 0.9% NS @ 100ml/hour while waiting for adequate PO intake   E: Replete PRN  N: Regular diet    GI: Continue bowel regimen with Colace BID, Milk of Mg PRN        ·  IV Zofran 4mg PRN for nausea/vomiting      DVT ppx:  - ASA 81 to start PM POD#1 then continue once daily x30 days postoperatively (end date 12/11)  - SQ heparin TID x 3 days then transition to SQ Lovenox on POD#3 (PM 11/13)   - Use of SCDs while on bedrest       Disposition: Continue care on RNF. Will remain inpatient for continued flap monitoring and vac/drain surveillance postoperatively.     Patient evaluated and plan discussed with Dr. Aguiar, PA-C    Plastic and Reconstructive Surgery   [facility]  #[id number]  Team phones: [phone]

## 2023-11-11 NOTE — BRIEF OP NOTE
Date: 11/10/2023 - 2023  OR Location: [facility] OR    Name: Cheyanne Rubio : 1970, Age: 53 y.o., MRN: [id number], Sex: female    Diagnosis  Pre-op Diagnosis     * Family history of malignant neoplasm of breast [Z80.3]     * Monoallelic mutation of PALB2 gene [Z15.01, Z15.89, Z15.09]     * Macromastia [N62] Post-op Diagnosis     * Family history of malignant neoplasm of breast [Z80.3]     * Monoallelic mutation of PALB2 gene [Z15.01, Z15.89, Z15.09]     * Macromastia [N62]     Procedures  Creation Myocutaneous Flap Transverse Rectus Abdominis for immediate reconstruction, BILATERAL Breast    Reconstruction Abdominal Wall with mesh    Bilateral skin sparing mastectomy  04415 - FL MASTECTOMY SIMPLE COMPLETE      Surgeons   Panel 1:     * Crystal Gorman - Primary  Panel 2:     * Lynda Barahona - Primary    Resident/Fellow/Other Assistant:  Surgeon(s) and Role:  Panel 1:     * CHRIS ZavalaC - Assisting     * OTILIO Brown-CNP - Assisting     * Jaye Nicholas MD - Resident - Assisting  Panel 2:     * Guevara Rodriguez MD - Resident - Assisting    Procedure Summary  Anesthesia: General  ASA: I  Anesthesia Staff: Anesthesiologist: Claudine Mckeon MD; Kwesi Forrest DO; Domenic Velasco MD  CRNA: Kate Juarez APRN-CRNA  C-AA: SHRADDHA Dupree  Anesthesia Resident: Peña Alonso MD  Estimated Blood Loss: 100 mL  Intra-op Medications:   Medication Name Total Dose   lidocaine PF (Xylocaine) 10 mg/mL (1 %) injection 15 mL   sodium chloride 0.9 % irrigation solution 3,000 mL   heparin (porcine) 2,500 Units in sodium chloride 0.9 % 250 mL irrigation 2,500 Units   ropivacaine (PF) in NS cmpd (Naropin) 1000 mg/500 mL (0.2%) infusion 2 mL   ketamine (Ketalar) 500 mg in dextrose 5 % in water (D5W) 250 mL (2 mg/mL) infusion 89.55 mg              Anesthesia Record               Intraprocedure I/O Totals          Intake    Dexmedetomidine 0.00 mL    The total shown is the total volume  documented since Anesthesia Start was filed.    Ketamine 0.00 mL    The total shown is the total volume documented since Anesthesia Start was filed.    LR 4800.00 mL    Propofol Drip 0.00 mL    The total shown is the total volume documented since Anesthesia Start was filed.    Phenylephrine Drip 0.00 mL    The total shown is the total volume documented since Anesthesia Start was filed.    Total Intake 4800 mL       Output    Urine 4260 mL    Est. Blood Loss 150 mL    Total Output 4410 mL       Net    Net Volume 390 mL          Specimen:   ID Type Source Tests Collected by Time   1 : RIGHT BREAST Tissue BREAST MASTECTOMY RIGHT SURGICAL PATHOLOGY EXAM Lynda Barahona MD 11/10/2023 1010   2 : LEFT BREAST Tissue BREAST MASTECTOMY LEFT SURGICAL PATHOLOGY EXAM Lynda Barahona MD 11/10/2023 1104        Staff:   Circulator: Chinedu Hutchinson RN; Brandon Call RN; Arnold Keith RN  Relief Circulator: Chinedu Hutchinson RN; Virginia Benz RN  Relief Scrub: Shruthi Sol; Jordyn Christianson RN; Светлана Castorena  Scrub Person: Ace Castillo RN; Светлана Castorena; Pamela Hernández          Findings: viable perforators with TRAM flap, abdominal wall defect 15 x 20 cm    Complications:  None; patient tolerated the procedure well.     Disposition: PACU - hemodynamically stable.  Condition: stable  Specimens Collected:   ID Type Source Tests Collected by Time   1 : RIGHT BREAST Tissue BREAST MASTECTOMY RIGHT SURGICAL PATHOLOGY EXAM Lynda Barahona MD 11/10/2023 1010   2 : LEFT BREAST Tissue BREAST MASTECTOMY LEFT SURGICAL PATHOLOGY EXAM Lynda Barahona MD 11/10/2023 1104     Attending Attestation: I was present and scrubbed for the entire procedure.    Crystal Gorman  Phone Number: [phone]

## 2023-11-12 LAB
ALBUMIN SERPL BCP-MCNC: 3.3 G/DL (ref 3.4–5)
ANION GAP SERPL CALC-SCNC: 14 MMOL/L (ref 10–20)
BUN SERPL-MCNC: 7 MG/DL (ref 6–23)
CALCIUM SERPL-MCNC: 7.6 MG/DL (ref 8.6–10.6)
CHLORIDE SERPL-SCNC: 108 MMOL/L (ref 98–107)
CO2 SERPL-SCNC: 21 MMOL/L (ref 21–32)
CREAT SERPL-MCNC: 0.71 MG/DL (ref 0.5–1.05)
ERYTHROCYTE [DISTWIDTH] IN BLOOD BY AUTOMATED COUNT: 13 % (ref 11.5–14.5)
GFR SERPL CREATININE-BSD FRML MDRD: >90 ML/MIN/1.73M*2
GLUCOSE BLD MANUAL STRIP-MCNC: 93 MG/DL (ref 74–99)
GLUCOSE SERPL-MCNC: 67 MG/DL (ref 74–99)
HCT VFR BLD AUTO: 26.7 % (ref 36–46)
HGB BLD-MCNC: 8.4 G/DL (ref 12–16)
MAGNESIUM SERPL-MCNC: 2.01 MG/DL (ref 1.6–2.4)
MCH RBC QN AUTO: 30.5 PG (ref 26–34)
MCHC RBC AUTO-ENTMCNC: 31.5 G/DL (ref 32–36)
MCV RBC AUTO: 97 FL (ref 80–100)
NRBC BLD-RTO: 0 /100 WBCS (ref 0–0)
PHOSPHATE SERPL-MCNC: 2.2 MG/DL (ref 2.5–4.9)
PLATELET # BLD AUTO: 119 X10*3/UL (ref 150–450)
POTASSIUM SERPL-SCNC: 3.2 MMOL/L (ref 3.5–5.3)
RBC # BLD AUTO: 2.75 X10*6/UL (ref 4–5.2)
SODIUM SERPL-SCNC: 140 MMOL/L (ref 136–145)
WBC # BLD AUTO: 8.7 X10*3/UL (ref 4.4–11.3)

## 2023-11-12 PROCEDURE — 96372 THER/PROPH/DIAG INJ SC/IM: CPT | Performed by: NURSE PRACTITIONER

## 2023-11-12 PROCEDURE — 80069 RENAL FUNCTION PANEL: CPT | Performed by: PHYSICIAN ASSISTANT

## 2023-11-12 PROCEDURE — 2500000001 HC RX 250 WO HCPCS SELF ADMINISTERED DRUGS (ALT 637 FOR MEDICARE OP)

## 2023-11-12 PROCEDURE — 36415 COLL VENOUS BLD VENIPUNCTURE: CPT | Performed by: PHYSICIAN ASSISTANT

## 2023-11-12 PROCEDURE — 2500000004 HC RX 250 GENERAL PHARMACY W/ HCPCS (ALT 636 FOR OP/ED): Performed by: STUDENT IN AN ORGANIZED HEALTH CARE EDUCATION/TRAINING PROGRAM

## 2023-11-12 PROCEDURE — 1170000001 HC PRIVATE ONCOLOGY ROOM DAILY

## 2023-11-12 PROCEDURE — 2500000001 HC RX 250 WO HCPCS SELF ADMINISTERED DRUGS (ALT 637 FOR MEDICARE OP): Performed by: STUDENT IN AN ORGANIZED HEALTH CARE EDUCATION/TRAINING PROGRAM

## 2023-11-12 PROCEDURE — 99231 SBSQ HOSP IP/OBS SF/LOW 25: CPT

## 2023-11-12 PROCEDURE — 82947 ASSAY GLUCOSE BLOOD QUANT: CPT

## 2023-11-12 PROCEDURE — 2500000001 HC RX 250 WO HCPCS SELF ADMINISTERED DRUGS (ALT 637 FOR MEDICARE OP): Performed by: ANESTHESIOLOGY

## 2023-11-12 PROCEDURE — 2500000004 HC RX 250 GENERAL PHARMACY W/ HCPCS (ALT 636 FOR OP/ED): Performed by: NURSE PRACTITIONER

## 2023-11-12 PROCEDURE — 99232 SBSQ HOSP IP/OBS MODERATE 35: CPT

## 2023-11-12 PROCEDURE — 2500000001 HC RX 250 WO HCPCS SELF ADMINISTERED DRUGS (ALT 637 FOR MEDICARE OP): Performed by: NURSE PRACTITIONER

## 2023-11-12 PROCEDURE — 85027 COMPLETE CBC AUTOMATED: CPT | Performed by: PHYSICIAN ASSISTANT

## 2023-11-12 PROCEDURE — 83735 ASSAY OF MAGNESIUM: CPT | Performed by: PHYSICIAN ASSISTANT

## 2023-11-12 RX ORDER — POTASSIUM CHLORIDE 1.5 G/1.58G
40 POWDER, FOR SOLUTION ORAL ONCE
Status: COMPLETED | OUTPATIENT
Start: 2023-11-12 | End: 2023-11-12

## 2023-11-12 RX ORDER — DEXTROSE MONOHYDRATE AND SODIUM CHLORIDE 5; .45 G/100ML; G/100ML
100 INJECTION, SOLUTION INTRAVENOUS CONTINUOUS
Status: DISCONTINUED | OUTPATIENT
Start: 2023-11-12 | End: 2023-11-14

## 2023-11-12 RX ADMIN — TRAMADOL HYDROCHLORIDE 50 MG: 50 TABLET, COATED ORAL at 18:46

## 2023-11-12 RX ADMIN — TRAMADOL HYDROCHLORIDE 100 MG: 50 TABLET, COATED ORAL at 22:48

## 2023-11-12 RX ADMIN — METHOCARBAMOL 1000 MG: 100 INJECTION INTRAMUSCULAR; INTRAVENOUS at 20:22

## 2023-11-12 RX ADMIN — AMITRIPTYLINE HYDROCHLORIDE 25 MG: 25 TABLET, FILM COATED ORAL at 20:22

## 2023-11-12 RX ADMIN — NITROGLYCERIN 0.5 INCH: 20 OINTMENT TOPICAL at 20:22

## 2023-11-12 RX ADMIN — TRAMADOL HYDROCHLORIDE 100 MG: 50 TABLET, COATED ORAL at 09:57

## 2023-11-12 RX ADMIN — HEPARIN SODIUM 5000 UNITS: 5000 INJECTION, SOLUTION INTRAVENOUS; SUBCUTANEOUS at 14:50

## 2023-11-12 RX ADMIN — NITROGLYCERIN 0.5 INCH: 20 OINTMENT TOPICAL at 21:15

## 2023-11-12 RX ADMIN — DOCUSATE SODIUM 100 MG: 100 CAPSULE, LIQUID FILLED ORAL at 20:22

## 2023-11-12 RX ADMIN — DOCUSATE SODIUM 100 MG: 100 CAPSULE, LIQUID FILLED ORAL at 08:42

## 2023-11-12 RX ADMIN — POTASSIUM CHLORIDE 40 MEQ: 1.5 POWDER, FOR SOLUTION ORAL at 14:47

## 2023-11-12 RX ADMIN — ACETAMINOPHEN 650 MG: 325 TABLET ORAL at 05:07

## 2023-11-12 RX ADMIN — TRAMADOL HYDROCHLORIDE 100 MG: 50 TABLET, COATED ORAL at 06:12

## 2023-11-12 RX ADMIN — METHOCARBAMOL 1000 MG: 100 INJECTION INTRAMUSCULAR; INTRAVENOUS at 12:31

## 2023-11-12 RX ADMIN — GABAPENTIN 600 MG: 300 CAPSULE ORAL at 05:07

## 2023-11-12 RX ADMIN — HEPARIN SODIUM 5000 UNITS: 5000 INJECTION, SOLUTION INTRAVENOUS; SUBCUTANEOUS at 05:07

## 2023-11-12 RX ADMIN — HEPARIN SODIUM 5000 UNITS: 5000 INJECTION, SOLUTION INTRAVENOUS; SUBCUTANEOUS at 22:49

## 2023-11-12 RX ADMIN — ACETAMINOPHEN 650 MG: 325 TABLET ORAL at 12:31

## 2023-11-12 RX ADMIN — GABAPENTIN 600 MG: 300 CAPSULE ORAL at 18:48

## 2023-11-12 RX ADMIN — SODIUM CHLORIDE 100 ML/HR: 9 INJECTION, SOLUTION INTRAVENOUS at 09:59

## 2023-11-12 RX ADMIN — DEXTROSE AND SODIUM CHLORIDE 100 ML/HR: 5; 450 INJECTION, SOLUTION INTRAVENOUS at 19:55

## 2023-11-12 RX ADMIN — TRAMADOL HYDROCHLORIDE 100 MG: 50 TABLET, COATED ORAL at 01:57

## 2023-11-12 RX ADMIN — TRAMADOL HYDROCHLORIDE 100 MG: 50 TABLET, COATED ORAL at 14:18

## 2023-11-12 RX ADMIN — NITROGLYCERIN 0.5 INCH: 20 OINTMENT TOPICAL at 14:50

## 2023-11-12 RX ADMIN — Medication: at 20:23

## 2023-11-12 RX ADMIN — ACETAMINOPHEN 650 MG: 325 TABLET ORAL at 18:46

## 2023-11-12 RX ADMIN — METHOCARBAMOL 1000 MG: 100 INJECTION INTRAMUSCULAR; INTRAVENOUS at 05:07

## 2023-11-12 RX ADMIN — ASPIRIN 81 MG: 81 TABLET, COATED ORAL at 08:43

## 2023-11-12 ASSESSMENT — PAIN DESCRIPTION - LOCATION
LOCATION: BREAST

## 2023-11-12 ASSESSMENT — COGNITIVE AND FUNCTIONAL STATUS - GENERAL
DAILY ACTIVITIY SCORE: 18
MOVING FROM LYING ON BACK TO SITTING ON SIDE OF FLAT BED WITH BEDRAILS: A LITTLE
MOVING TO AND FROM BED TO CHAIR: A LITTLE
PERSONAL GROOMING: A LITTLE
MOBILITY SCORE: 18
EATING MEALS: A LITTLE
TURNING FROM BACK TO SIDE WHILE IN FLAT BAD: A LITTLE
CLIMB 3 TO 5 STEPS WITH RAILING: A LITTLE
DRESSING REGULAR LOWER BODY CLOTHING: A LITTLE
STANDING UP FROM CHAIR USING ARMS: A LITTLE
DRESSING REGULAR UPPER BODY CLOTHING: A LITTLE
HELP NEEDED FOR BATHING: A LITTLE
WALKING IN HOSPITAL ROOM: A LITTLE
TOILETING: A LITTLE

## 2023-11-12 ASSESSMENT — PAIN SCALES - GENERAL
PAINLEVEL_OUTOF10: 4
PAINLEVEL_OUTOF10: 8
PAINLEVEL_OUTOF10: 2
PAINLEVEL_OUTOF10: 8
PAINLEVEL_OUTOF10: 5 - MODERATE PAIN
PAINLEVEL_OUTOF10: 4
PAINLEVEL_OUTOF10: 5 - MODERATE PAIN
PAINLEVEL_OUTOF10: 6
PAINLEVEL_OUTOF10: 2
PAINLEVEL_OUTOF10: 2

## 2023-11-12 ASSESSMENT — PAIN - FUNCTIONAL ASSESSMENT
PAIN_FUNCTIONAL_ASSESSMENT: 0-10

## 2023-11-12 ASSESSMENT — PAIN DESCRIPTION - ORIENTATION
ORIENTATION: RIGHT;LEFT
ORIENTATION: ANTERIOR
ORIENTATION: RIGHT;LEFT
ORIENTATION: ANTERIOR
ORIENTATION: RIGHT;LEFT

## 2023-11-12 NOTE — PROGRESS NOTES
"Plastic Surgery Progress Note    Patient Name: Cheyanne Rubio  MRN: [id number]  Date:  11/12/23     Subjective  Found resting in bed comfortably. Continues to endorse abdominal pain, however it is more controlled today with pain managements recs of starting Amitriptyline 25 mg at bedtime. Continues to report pain to b/l breasts remains well controlled. Continues to report decreased appetite and PO intake of food, however states she is drinking a good amount of fluids, she states she will try vegetable broth today. Denies any fever, chills, night sweats, CP, SOB, palpitations, nausea, vomiting, or abdominal pain/discomfort.     Overnight Events  NAOE    Objective    Vital Signs  /77   Pulse 66   Temp 36.6 °C (97.9 °F)   Resp 18   Ht 1.702 m (5' 7.01\")   Wt 90.3 kg (199 lb 1.2 oz)   SpO2 96%   BMI 31.17 kg/m²      Physical Exam   Constitutional: NAD.   Eyes: Clear sclera. EOMI.   ENMT: Moist mucous membranes, no apparent injuries or lesions.  Head/Neck: NCAT.  Cardiovascular: RRR.  Respiratory/Thorax: Unlabored respirations on RA. Symmetric chest rise.   Gastrointestinal: Abdomen soft, non-distended. Prevena vac in place, holding suction at -125 mmHg, no leaks or obstruction, umbilical region covered with xeroform and Aquacel Ag.  Genitourinary: Indwelling palomino in place with clear yellow output.   Neurological: A&Ox3.   Skin: Warm and dry with no lesions or rashes.   Breast: Bilateral ALEKSANDR free flap recipient site soft, warm to touch, diffuse purple bruising throughout central part of native breast tissue which has remained stable. Linear skin tear to R IMF fold medially. Flap approximated with intact sutures and incision lines dressed with Xeroform dressing, no evidence of dehiscence. Intact doppler pulses at the upper, medial corners near the sternum bilaterally. x1 ROBBY drain to each breast with sanguinous output.   Diagnostics   No results found for this or any previous visit (from the past 24 hour(s)).  No " results found.    Current Medications  Scheduled medications  acetaminophen, 650 mg, oral, q6h ADY  amitriptyline, 25 mg, oral, Nightly  aspirin, 81 mg, oral, Daily  docusate sodium, 100 mg, oral, BID  gabapentin, 600 mg, oral, q12h  heparin (porcine), 5,000 Units, subcutaneous, q8h  methocarbamol, 1,000 mg, intravenous, q8h      Continuous medications  HYDROmorphone,   ropivacaine (PF) in NS cmpd, 6 mL/hr, Last Rate: 12 mL/hr (11/10/23 1505)  ropivacaine (PF) in NS cmpd, 6 mL/hr  sodium chloride 0.9%, 100 mL/hr, Last Rate: 100 mL/hr (11/12/23 2348)      PRN medications  PRN medications: diphenhydrAMINE, magnesium hydroxide, naloxone, naloxone, ondansetron **OR** ondansetron, traMADol, traMADol     Assessment  Cheyanne is a 53 year old female with a past medical history of endometriosis, thyroid nodule, vitamin D deficiency, uterine polyp and 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 Barahona of breast surgery and Dr. Gorman of plastic surgery for bilateral cancer risk reduction mastectomies followed by immediate bilateral ALEKSANDR free flap reconstruction on 11/20/2023.      Plan/Recommendations     # S/p ALEKSANDR free flap reconstruction, complex closure, application wound vac   - Continue serial flap assessments Q2hour per nursing       ·  Assess clinical appearance of flap (color, warmth, turgor)       ·  Nursing to notify plastic surgery team immediately if there are any acute changes          (Including pallor, temperature change, bleeding, etc.)  - Continue use of Cas hugger to breast flap region and surrounding skin         ·  Settings: low heat (36*C), medium continuous fan        ·  5 hours on, 1 hour off x 3 days postoperatively (end date PM 11/13)        ·  Apply ABDs loosely over flap then Cas Hugger blanket to avoid direct contact of warmer with skin/flap  - Daily local wound care/dressing changes per plastics       ·  Xeroform strips to ALEKSANDR flap incision lines at breast  changed daily and PRN if soiled          ·  Xeroform over umbilical incision covered by Tegaderm dressing to remain in place until vac removal       ·  Monitor surgical sites (including inferior incision lines) for s/s of bleeding, infection or dehiscence        ·  Apply nitropaste BID to areas of bruising/discoloration on b/l breasts, plastics to apply AM and PM dose. First dose applied per Dr. Gorman, use 1 packet per breast. Continued use/frequency pending appearance and response of bruising.   - Continue ROBBY drain care per nursing (x1 to R breast, x1 to L breast)       ·  Empty and record output at least every 8 hours       ·  Strip drains TID and PRN to avoid drain obstruction        ·  Removal per plastic surgery team when output <30cc output/24hrs for x2 consecutive days       ·  Call plastics if drain output is >30cc in an hour or greater than 200cc over 8 hours  - Continue incisional wound vac therapy to Pfannenstiel incision at lower abdomen x14 days post op (end date ~11/24)        ·  Settings: 125 mmHg low continuous suction        ·  Please keep dressing C/D/I, reinforce PRN        ·  Please alert plastics team with any concerns regarding vac        ·  Transition to prevena upon DC  - Maintain beach chair positioning at all times        ·  Head elevated and hips flexed at least 60 degrees, knees bent and elevated on pillows   - Avoid positioning that would apply pressure to flap region or incisions   - Patient to maintain strict bedrest postoperatively x 3 days (end date 11/13)        ·  Maintain palomino catheter until off bedrest  - Continue to record and monitor strict I&Os  - S/p x3 doses IV ancef postoperatively   - BP goal of 110/60 minimum to ensure adequate flap perfusion   - SQ Heparin [id number], transition to Lovenox on POD #3 (PM 11/13)   - Daily ASA to start PM POD #1 then continue for x30 days postoperatively (end date 12/11)   - Encourage IS x10 every hour while awake   - Monitor VS/pulse ox  [id number]   - Monitor AM CBC/RFP/Mg      # Acute postoperative pain  - Current regimen       ·  Tylenol 650mg PO [id number]        ·  Tramadol 50/100mg PO [id number] PRN moderate/severe pain       ·  Gabapentin 600mg BID        ·  Robaxin 1000mg IV [id number]        ·  Dilaudid PCA as recommended by acute pain service, plan to wean once able to get out of bed   - Acute pain following, appreciate recs        ·  Ropivacaine pain catheters x2 per anesthesia        ·  Amitriptyline 25mg nightly   - Bowel regimen: colace 100mg BID, Milk of Mg PRN for constipation while taking narcotics   - Benadryl 25mg IV PRN itching while taking narcotics   - Pain assessments every hour while on PCA      # Acute postoperative anemia   - Incoming/baseline HGB ~13 (as of recent labs 11/6/2023)  - AM Hbg 11/11 slightly downtrended 9.8, no indication for transfusion at this time       · 11/12 Hgb downtended to 8.4, no acute signs of bleeding, no need for transfusion at this time  - Ensure T&S active, last updated 11/11, next due 11/14   - Monitor for S/S of bleeding or symptomatic anemia   - Monitor AM CBC      # Hypokalemia  - K: 3.2 on 11/12 AM labs  - Repleted with 40 mEq x1   - Replete as needed  - Continue to follow AM BMP     F: 0.9% NS @ 100ml/hour while waiting for adequate PO intake   E: Replete PRN  N: Regular diet    GI: Continue bowel regimen with Colace BID, Milk of Mg PRN        ·  IV Zofran 4mg PRN for nausea/vomiting      DVT ppx:  - ASA 81 to start PM POD#1 then continue once daily x30 days postoperatively (end date 12/11)  - SQ heparin TID x 3 days then transition to SQ Lovenox on POD#3 (PM 11/13)   - Use of SCDs while on bedrest       Disposition: Continue care on RNF. Will remain inpatient for continued flap monitoring and vac/drain surveillance postoperatively.     Patient plan discussed with Dr. Katz, APRN-CNP   Plastic and Reconstructive Surgery   [facility]  Pager #[phone]  Team phones: [phone]

## 2023-11-12 NOTE — PROGRESS NOTES
Transitional Care Coordinator Note: Met with patient to discuss discharge planning s/p admission.  Patient lives home with .  Independent in all ADL's. Requires no assist devices for ambulation.  Patient denies active home care or home care needs.  Demographics and contact information confirmed.  Will continue to monitor patient for all home going needs.  Mellisa Washington RN TCC via Epic.    Transport-  at discharge   PCP- does not have one at this time

## 2023-11-12 NOTE — PROGRESS NOTES
"Cheyanne Rubio is a 53 y.o. female on day 2 of admission s/p bilateral prophylactic mastectomies and autologous reconstruction.     Subjective   No acute overnight events. Seen on morning rounds. Pain well controlled. Flap checks have been unremarkable.     Objective   Constitutional: Awake, alert, no acute distress  Neurological: Numbness in bilateral upper extremities improved.   Eyes: Non-icteric  Respiratory: No increased work of breathing on RA  Cardiac: Regular rate  Chest: Bilateral ALEKSANDR flaps soft, warm, some ecchymosis. Bilateral ROBBY drains with serosanguinous drainage  Abdomen: Soft, nontender, nondistended. Prevena vac in place holding suction.   Genitourinary: Parker in place draining clear yellow urine  Skin: Warm, dry  Musculoskeletal: Moves all extremities  Extremities: No peripheral edema         Psychological: Appropriate mood/affect     Last Recorded Vitals  Blood pressure 102/69, pulse 68, temperature 35.7 °C (96.3 °F), temperature source Temporal, resp. rate 16, height 1.702 m (5' 7.01\"), weight 90.3 kg (199 lb 1.2 oz), SpO2 96 %.  Intake/Output last 3 Shifts:  I/O last 3 completed shifts:  In: 5950 (65.9 mL/kg) [I.V.:1150 (12.7 mL/kg); IV Piggyback:4800]  Out: 4800 (53.2 mL/kg) [Urine:4585 (1.4 mL/kg/hr); Drains:65; Blood:150]  Weight: 90.3 kg      Relevant Results        Results for orders placed or performed during the hospital encounter of 11/10/23 (from the past 24 hour(s))   Blood Gas Arterial Full Panel   Result Value Ref Range     POCT pH, Arterial 7.34 (L) 7.38 - 7.42 pH     POCT pCO2, Arterial 44 (H) 38 - 42 mm Hg     POCT pO2, Arterial 137 (H) 85 - 95 mm Hg     POCT SO2, Arterial 97 94 - 100 %     POCT Oxy Hemoglobin, Arterial 97.0 94.0 - 98.0 %     POCT Hematocrit Calculated, Arterial 39.0 36.0 - 46.0 %     POCT Sodium, Arterial 140 136 - 145 mmol/L     POCT Potassium, Arterial 4.1 3.5 - 5.3 mmol/L     POCT Chloride, Arterial 109 (H) 98 - 107 mmol/L     POCT Ionized Calcium, Arterial 1.24 " 1.10 - 1.33 mmol/L     POCT Glucose, Arterial 186 (H) 74 - 99 mg/dL     POCT Lactate, Arterial 1.3 0.4 - 2.0 mmol/L     POCT Base Excess, Arterial -2.2 (L) -2.0 - 3.0 mmol/L     POCT HCO3 Calculated, Arterial 23.7 22.0 - 26.0 mmol/L     POCT Hemoglobin, Arterial 13.0 12.0 - 16.0 g/dL     POCT Anion Gap, Arterial 11 10 - 25 mmo/L     Patient Temperature 37.0 degrees Celsius     FiO2 50 %   CBC   Result Value Ref Range     WBC 14.0 (H) 4.4 - 11.3 x10*3/uL     nRBC 0.0 0.0 - 0.0 /100 WBCs     RBC 3.38 (L) 4.00 - 5.20 x10*6/uL     Hemoglobin 10.6 (L) 12.0 - 16.0 g/dL     Hematocrit 30.9 (L) 36.0 - 46.0 %     MCV 91 80 - 100 fL     MCH 31.4 26.0 - 34.0 pg     MCHC 34.3 32.0 - 36.0 g/dL     RDW 12.2 11.5 - 14.5 %     Platelets 175 150 - 450 x10*3/uL   Renal function panel   Result Value Ref Range     Glucose 132 (H) 74 - 99 mg/dL     Sodium 147 (H) 136 - 145 mmol/L     Potassium 3.7 3.5 - 5.3 mmol/L     Chloride 112 (H) 98 - 107 mmol/L     Bicarbonate 26 21 - 32 mmol/L     Anion Gap 13 10 - 20 mmol/L     Urea Nitrogen 9 6 - 23 mg/dL     Creatinine 0.71 0.50 - 1.05 mg/dL     eGFR >90 >60 mL/min/1.73m*2     Calcium 8.8 8.6 - 10.6 mg/dL     Phosphorus 3.5 2.5 - 4.9 mg/dL     Albumin 4.0 3.4 - 5.0 g/dL   Magnesium   Result Value Ref Range     Magnesium 1.73 1.60 - 2.40 mg/dL   CBC   Result Value Ref Range     WBC 12.6 (H) 4.4 - 11.3 x10*3/uL     nRBC 0.0 0.0 - 0.0 /100 WBCs     RBC 3.17 (L) 4.00 - 5.20 x10*6/uL     Hemoglobin 9.8 (L) 12.0 - 16.0 g/dL     Hematocrit 27.9 (L) 36.0 - 46.0 %     MCV 88 80 - 100 fL     MCH 30.9 26.0 - 34.0 pg     MCHC 35.1 32.0 - 36.0 g/dL     RDW 12.4 11.5 - 14.5 %     Platelets 192 150 - 450 x10*3/uL        Assessment/Plan   Principal Problem:    Monoallelic mutation of PALB2 gene  Active Problems:    Status post breast reconstruction    PONV (postoperative nausea and vomiting)    Recent URI     Cheyanne Rubio is a 53 year old F s/p bilateral risk reducing mastectomy with Dr. Barahona and bilateral  ALEKSANDR reconstruction with Dr. Gorman for PALB2 mutation on 11/10. Patient is recovering well post operatively.      - Plastics primary  - Pain management, drains, wound care per Plastics     Discussed with attending surgeon, Dr. Barahona.      Letty Tijerina MD  PGY3   General Surgery   Breast Surgery pager [phone]

## 2023-11-12 NOTE — SIGNIFICANT EVENT
Flap Checks    19:30  Breast: Bilateral ALEKSANDR free flap recipient site soft, warm to touch, diffuse purple bruising throughout central part of native breast tissue which has remained stable. Flap approximated with intact sutures and incision lines dressed with Xeroform dressing, no evidence of dehiscence. Intact doppler pulses at the upper, lateral corners near the sternum bilaterally. x1 ROBBY drain to each breast with sanguinous output.      23:45  Flap stable in appearance and present doppler signals as above.    04:00  Flap stable in appearance and present doppler signals as above.    Irma Talley MD  General Surgery  Plastic Surgery [phone], [phone]

## 2023-11-12 NOTE — PROGRESS NOTES
Acute Pain Service     Postop Pain HPI -   Palliative: relieved with IV analgesics and regional local anesthetics  Provocative: movement  Quality:  burning and aching  Radiation:  none  Severity:  2/10  Timing: constant    24-HOUR OPIOID CONSUMPTION:  Dilaudid PCA 1 mg    Scheduled medications  acetaminophen, 650 mg, oral, q6h ADY  amitriptyline, 25 mg, oral, Nightly  aspirin, 81 mg, oral, Daily  docusate sodium, 100 mg, oral, BID  gabapentin, 600 mg, oral, q12h  heparin (porcine), 5,000 Units, subcutaneous, q8h  methocarbamol, 1,000 mg, intravenous, q8h      Continuous medications  HYDROmorphone,   ropivacaine (PF) in NS cmpd, 6 mL/hr, Last Rate: 12 mL/hr (11/10/23 1505)  ropivacaine (PF) in NS cmpd, 6 mL/hr  sodium chloride 0.9%, 100 mL/hr, Last Rate: 100 mL/hr (11/11/23 2004)      PRN medications  PRN medications: diphenhydrAMINE, magnesium hydroxide, naloxone, naloxone, ondansetron **OR** ondansetron, traMADol, traMADol     Physical Exam:  Constitutional:  no distress, alert and cooperative  Eyes: clear sclera  Head/Neck: No apparent injury, trachea midline  Respiratory/Thorax: Patent airways, thorax symmetric, breathing comfortably  Cardiovascular: no pitting edema  Gastrointestinal: Nondistended  Musculoskeletal: ROM intact  Extremities: no clubbing  Neurological: alert, perez x4  Psychological: Appropriate affect    Results for orders placed or performed during the hospital encounter of 11/10/23 (from the past 24 hour(s))   Type and screen   Result Value Ref Range    ABO TYPE B     Rh TYPE NEG     ANTIBODY SCREEN NEG    CBC   Result Value Ref Range    WBC 12.6 (H) 4.4 - 11.3 x10*3/uL    nRBC 0.0 0.0 - 0.0 /100 WBCs    RBC 3.17 (L) 4.00 - 5.20 x10*6/uL    Hemoglobin 9.8 (L) 12.0 - 16.0 g/dL    Hematocrit 27.9 (L) 36.0 - 46.0 %    MCV 88 80 - 100 fL    MCH 30.9 26.0 - 34.0 pg    MCHC 35.1 32.0 - 36.0 g/dL    RDW 12.4 11.5 - 14.5 %    Platelets 192 150 - 450 x10*3/uL   Renal function panel   Result Value Ref Range     Glucose 96 74 - 99 mg/dL    Sodium 145 136 - 145 mmol/L    Potassium 3.6 3.5 - 5.3 mmol/L    Chloride 111 (H) 98 - 107 mmol/L    Bicarbonate 21 21 - 32 mmol/L    Anion Gap 17 10 - 20 mmol/L    Urea Nitrogen 11 6 - 23 mg/dL    Creatinine 0.71 0.50 - 1.05 mg/dL    eGFR >90 >60 mL/min/1.73m*2    Calcium 8.4 (L) 8.6 - 10.6 mg/dL    Phosphorus 3.5 2.5 - 4.9 mg/dL    Albumin 3.6 3.4 - 5.0 g/dL       Cheyanne Rubio is a 53 y.o. year old female patient presenting for Bilateral cancer risk reduction mastectomies, bilateral ALEKSANDR flap reconstruction, abdominal wall reconstruction, BL skin sparing mastectomy with Dr. Gorman/Jun on 11/10/23. Acute pain was consulted for postoperative pain management.       T6 paravertebral single shot nerve blocks bilateral + T6 erector spinae plane blocks with with catheters, T10 paravertebral single shot nerve blocks bilateral + T10 erector spinae plane blocks with catheters placed preoperatively on 11/10/23     PLAN  - Continue Dilaudid PCA and Amitriptyline 25 mg at bedtime  - Refill and Continue Ambit ball with Ropivacaine 0.2%/NaCl 0.9% 500mL, Rate 7cc/hr  - Ambit medication will not interfere with pain medication prescribed by the primary team.   - Please be aware of local anesthetic toxic dose and absorption variability before considering lidocaine patches  - Acute pain service will follow while catheters in place  - Rest of pain management per primary team     Acute Pain Resident  pg [phone] ph [phone]

## 2023-11-13 LAB
ALBUMIN SERPL BCP-MCNC: 3 G/DL (ref 3.4–5)
ANION GAP SERPL CALC-SCNC: 10 MMOL/L (ref 10–20)
BLOOD EXPIRATION DATE: NORMAL
BUN SERPL-MCNC: 4 MG/DL (ref 6–23)
CALCIUM SERPL-MCNC: 8.2 MG/DL (ref 8.6–10.6)
CHLORIDE SERPL-SCNC: 109 MMOL/L (ref 98–107)
CO2 SERPL-SCNC: 24 MMOL/L (ref 21–32)
CREAT SERPL-MCNC: 0.59 MG/DL (ref 0.5–1.05)
DISPENSE STATUS: NORMAL
ERYTHROCYTE [DISTWIDTH] IN BLOOD BY AUTOMATED COUNT: 12.6 % (ref 11.5–14.5)
GFR SERPL CREATININE-BSD FRML MDRD: >90 ML/MIN/1.73M*2
GLUCOSE SERPL-MCNC: 102 MG/DL (ref 74–99)
HCT VFR BLD AUTO: 23.9 % (ref 36–46)
HGB BLD-MCNC: 7.7 G/DL (ref 12–16)
MAGNESIUM SERPL-MCNC: 1.96 MG/DL (ref 1.6–2.4)
MCH RBC QN AUTO: 30.6 PG (ref 26–34)
MCHC RBC AUTO-ENTMCNC: 32.2 G/DL (ref 32–36)
MCV RBC AUTO: 95 FL (ref 80–100)
NRBC BLD-RTO: 0 /100 WBCS (ref 0–0)
PHOSPHATE SERPL-MCNC: 1.3 MG/DL (ref 2.5–4.9)
PLATELET # BLD AUTO: 160 X10*3/UL (ref 150–450)
POTASSIUM SERPL-SCNC: 3.1 MMOL/L (ref 3.5–5.3)
PRODUCT BLOOD TYPE: 1700
PRODUCT CODE: NORMAL
RBC # BLD AUTO: 2.52 X10*6/UL (ref 4–5.2)
SODIUM SERPL-SCNC: 140 MMOL/L (ref 136–145)
UNIT ABO: NORMAL
UNIT NUMBER: NORMAL
UNIT RH: NORMAL
UNIT VOLUME: 350
WBC # BLD AUTO: 7.5 X10*3/UL (ref 4.4–11.3)
XM INTEP: NORMAL

## 2023-11-13 PROCEDURE — 1170000001 HC PRIVATE ONCOLOGY ROOM DAILY

## 2023-11-13 PROCEDURE — 85027 COMPLETE CBC AUTOMATED: CPT

## 2023-11-13 PROCEDURE — 83735 ASSAY OF MAGNESIUM: CPT | Performed by: PHYSICIAN ASSISTANT

## 2023-11-13 PROCEDURE — 96372 THER/PROPH/DIAG INJ SC/IM: CPT | Performed by: NURSE PRACTITIONER

## 2023-11-13 PROCEDURE — 2500000004 HC RX 250 GENERAL PHARMACY W/ HCPCS (ALT 636 FOR OP/ED): Performed by: NURSE PRACTITIONER

## 2023-11-13 PROCEDURE — 36415 COLL VENOUS BLD VENIPUNCTURE: CPT | Performed by: PHYSICIAN ASSISTANT

## 2023-11-13 PROCEDURE — 36430 TRANSFUSION BLD/BLD COMPNT: CPT

## 2023-11-13 PROCEDURE — 30233N1 TRANSFUSION OF NONAUTOLOGOUS RED BLOOD CELLS INTO PERIPHERAL VEIN, PERCUTANEOUS APPROACH: ICD-10-PCS | Performed by: SURGERY

## 2023-11-13 PROCEDURE — 2500000001 HC RX 250 WO HCPCS SELF ADMINISTERED DRUGS (ALT 637 FOR MEDICARE OP): Performed by: ANESTHESIOLOGY

## 2023-11-13 PROCEDURE — P9016 RBC LEUKOCYTES REDUCED: HCPCS

## 2023-11-13 PROCEDURE — 85027 COMPLETE CBC AUTOMATED: CPT | Performed by: PHYSICIAN ASSISTANT

## 2023-11-13 PROCEDURE — 2500000001 HC RX 250 WO HCPCS SELF ADMINISTERED DRUGS (ALT 637 FOR MEDICARE OP): Performed by: NURSE PRACTITIONER

## 2023-11-13 PROCEDURE — 82374 ASSAY BLOOD CARBON DIOXIDE: CPT | Performed by: PHYSICIAN ASSISTANT

## 2023-11-13 PROCEDURE — 99231 SBSQ HOSP IP/OBS SF/LOW 25: CPT

## 2023-11-13 PROCEDURE — 2500000001 HC RX 250 WO HCPCS SELF ADMINISTERED DRUGS (ALT 637 FOR MEDICARE OP): Performed by: STUDENT IN AN ORGANIZED HEALTH CARE EDUCATION/TRAINING PROGRAM

## 2023-11-13 PROCEDURE — 2500000004 HC RX 250 GENERAL PHARMACY W/ HCPCS (ALT 636 FOR OP/ED)

## 2023-11-13 PROCEDURE — 36415 COLL VENOUS BLD VENIPUNCTURE: CPT

## 2023-11-13 PROCEDURE — 2500000004 HC RX 250 GENERAL PHARMACY W/ HCPCS (ALT 636 FOR OP/ED): Performed by: STUDENT IN AN ORGANIZED HEALTH CARE EDUCATION/TRAINING PROGRAM

## 2023-11-13 PROCEDURE — 96372 THER/PROPH/DIAG INJ SC/IM: CPT

## 2023-11-13 RX ORDER — ENOXAPARIN SODIUM 100 MG/ML
40 INJECTION SUBCUTANEOUS
Status: DISCONTINUED | OUTPATIENT
Start: 2023-11-13 | End: 2023-11-16 | Stop reason: HOSPADM

## 2023-11-13 RX ORDER — POTASSIUM CHLORIDE 20 MEQ/1
40 TABLET, EXTENDED RELEASE ORAL ONCE
Status: COMPLETED | OUTPATIENT
Start: 2023-11-13 | End: 2023-11-13

## 2023-11-13 RX ADMIN — ASPIRIN 81 MG: 81 TABLET, COATED ORAL at 09:00

## 2023-11-13 RX ADMIN — ACETAMINOPHEN 650 MG: 325 TABLET ORAL at 06:00

## 2023-11-13 RX ADMIN — AMITRIPTYLINE HYDROCHLORIDE 25 MG: 25 TABLET, FILM COATED ORAL at 20:48

## 2023-11-13 RX ADMIN — DEXTROSE AND SODIUM CHLORIDE 100 ML/HR: 5; 450 INJECTION, SOLUTION INTRAVENOUS at 14:00

## 2023-11-13 RX ADMIN — POTASSIUM CHLORIDE 40 MEQ: 1500 TABLET, EXTENDED RELEASE ORAL at 12:38

## 2023-11-13 RX ADMIN — HEPARIN SODIUM 5000 UNITS: 5000 INJECTION, SOLUTION INTRAVENOUS; SUBCUTANEOUS at 06:00

## 2023-11-13 RX ADMIN — ACETAMINOPHEN 650 MG: 325 TABLET ORAL at 12:37

## 2023-11-13 RX ADMIN — TRAMADOL HYDROCHLORIDE 50 MG: 50 TABLET, COATED ORAL at 16:07

## 2023-11-13 RX ADMIN — DOCUSATE SODIUM 100 MG: 100 CAPSULE, LIQUID FILLED ORAL at 09:00

## 2023-11-13 RX ADMIN — ACETAMINOPHEN 650 MG: 325 TABLET ORAL at 18:31

## 2023-11-13 RX ADMIN — DOCUSATE SODIUM 100 MG: 100 CAPSULE, LIQUID FILLED ORAL at 20:48

## 2023-11-13 RX ADMIN — TRAMADOL HYDROCHLORIDE 50 MG: 50 TABLET, COATED ORAL at 17:00

## 2023-11-13 RX ADMIN — TRAMADOL HYDROCHLORIDE 100 MG: 50 TABLET, COATED ORAL at 03:12

## 2023-11-13 RX ADMIN — METHOCARBAMOL 1000 MG: 100 INJECTION INTRAMUSCULAR; INTRAVENOUS at 13:00

## 2023-11-13 RX ADMIN — NITROGLYCERIN 0.5 INCH: 20 OINTMENT TOPICAL at 09:15

## 2023-11-13 RX ADMIN — GABAPENTIN 600 MG: 300 CAPSULE ORAL at 18:31

## 2023-11-13 RX ADMIN — ONDANSETRON 4 MG: 2 INJECTION INTRAMUSCULAR; INTRAVENOUS at 18:32

## 2023-11-13 RX ADMIN — TRAMADOL HYDROCHLORIDE 50 MG: 50 TABLET, COATED ORAL at 12:45

## 2023-11-13 RX ADMIN — METHOCARBAMOL 1000 MG: 100 INJECTION INTRAMUSCULAR; INTRAVENOUS at 06:00

## 2023-11-13 RX ADMIN — ACETAMINOPHEN 650 MG: 325 TABLET ORAL at 00:27

## 2023-11-13 RX ADMIN — TRAMADOL HYDROCHLORIDE 50 MG: 50 TABLET, COATED ORAL at 22:49

## 2023-11-13 RX ADMIN — TRAMADOL HYDROCHLORIDE 50 MG: 50 TABLET, COATED ORAL at 08:28

## 2023-11-13 RX ADMIN — ENOXAPARIN SODIUM 40 MG: 100 INJECTION SUBCUTANEOUS at 16:00

## 2023-11-13 RX ADMIN — METHOCARBAMOL 1000 MG: 100 INJECTION INTRAMUSCULAR; INTRAVENOUS at 20:48

## 2023-11-13 RX ADMIN — GABAPENTIN 600 MG: 300 CAPSULE ORAL at 06:00

## 2023-11-13 ASSESSMENT — PAIN - FUNCTIONAL ASSESSMENT
PAIN_FUNCTIONAL_ASSESSMENT: 0-10

## 2023-11-13 ASSESSMENT — PAIN SCALES - GENERAL
PAINLEVEL_OUTOF10: 7
PAINLEVEL_OUTOF10: 5 - MODERATE PAIN
PAINLEVEL_OUTOF10: 6
PAINLEVEL_OUTOF10: 3
PAINLEVEL_OUTOF10: 4
PAINLEVEL_OUTOF10: 6
PAINLEVEL_OUTOF10: 4
PAINLEVEL_OUTOF10: 3
PAINLEVEL_OUTOF10: 4
PAINLEVEL_OUTOF10: 2
PAINLEVEL_OUTOF10: 5 - MODERATE PAIN

## 2023-11-13 ASSESSMENT — PAIN DESCRIPTION - LOCATION
LOCATION: BREAST
LOCATION: BREAST
LOCATION: CHEST

## 2023-11-13 ASSESSMENT — PAIN DESCRIPTION - ORIENTATION: ORIENTATION: ANTERIOR

## 2023-11-13 ASSESSMENT — PAIN SCALES - WONG BAKER: WONGBAKER_NUMERICALRESPONSE: HURTS LITTLE BIT

## 2023-11-13 NOTE — PROGRESS NOTES
Plastic Surgery Progress Note    Patient Name: Cheyanne Rubio  MRN: [id number]  Date:  11/13/23     Subjective  Found resting in bed comfortably. Continues to endorse abdominal pain, rates 3/10. Notes some lightheadedness on exam today that she reports is more noticeable after nitropaste application. AM hgb 7.7. Bruising to bilat breasts significantly improved today. Reports decreased appetite since before surgery. Last BM Thurs 11/9. +flatus. Denies any fever, chills, night sweats, CP, SOB, palpitations, nausea, vomiting.    Overnight Events  NAOE    Objective    Physical Exam   Constitutional: NAD.   Eyes: Clear sclera. EOMI.   ENMT: Moist mucous membranes, no apparent injuries or lesions.  Head/Neck: NCAT.  Cardiovascular: RRR.  Respiratory/Thorax: Unlabored respirations on RA. Symmetric chest rise.   Gastrointestinal: Abdomen soft, non-distended. Prevena vac in place, holding suction at -125 mmHg, no leaks or obstruction, umbilical region covered with xeroform and Aquacel Ag. Diffusely tender to palpation, particularly along wound vac.   Genitourinary: Indwelling palomino in place with clear yellow output.   Neurological: A&Ox3.   Skin: Warm and dry with no lesions or rashes.   Breast: Bilateral ALEKSANDR free flap recipient site soft, warm to touch, diffuse bruising throughout central part of native breast tissue which has significantly improved on exam 11/13. Linear skin tear to R IMF fold medially. Flap approximated with intact sutures and incision lines dressed with Xeroform dressing, no evidence of dehiscence. Intact doppler pulses at the upper, medial corners near the sternum bilaterally. x1 ROBBY drain to each breast with ssg output.   Diagnostics   Results for orders placed or performed during the hospital encounter of 11/10/23 (from the past 24 hour(s))   Magnesium   Result Value Ref Range    Magnesium 2.01 1.60 - 2.40 mg/dL   CBC   Result Value Ref Range    WBC 8.7 4.4 - 11.3 x10*3/uL    nRBC 0.0 0.0 - 0.0 /100 WBCs     RBC 2.75 (L) 4.00 - 5.20 x10*6/uL    Hemoglobin 8.4 (L) 12.0 - 16.0 g/dL    Hematocrit 26.7 (L) 36.0 - 46.0 %    MCV 97 80 - 100 fL    MCH 30.5 26.0 - 34.0 pg    MCHC 31.5 (L) 32.0 - 36.0 g/dL    RDW 13.0 11.5 - 14.5 %    Platelets 119 (L) 150 - 450 x10*3/uL   Renal Function Panel   Result Value Ref Range    Glucose 67 (L) 74 - 99 mg/dL    Sodium 140 136 - 145 mmol/L    Potassium 3.2 (L) 3.5 - 5.3 mmol/L    Chloride 108 (H) 98 - 107 mmol/L    Bicarbonate 21 21 - 32 mmol/L    Anion Gap 14 10 - 20 mmol/L    Urea Nitrogen 7 6 - 23 mg/dL    Creatinine 0.71 0.50 - 1.05 mg/dL    eGFR >90 >60 mL/min/1.73m*2    Calcium 7.6 (L) 8.6 - 10.6 mg/dL    Phosphorus 2.2 (L) 2.5 - 4.9 mg/dL    Albumin 3.3 (L) 3.4 - 5.0 g/dL   POCT GLUCOSE   Result Value Ref Range    POCT Glucose 93 74 - 99 mg/dL   Magnesium   Result Value Ref Range    Magnesium 1.96 1.60 - 2.40 mg/dL   CBC   Result Value Ref Range    WBC 7.5 4.4 - 11.3 x10*3/uL    nRBC 0.0 0.0 - 0.0 /100 WBCs    RBC 2.52 (L) 4.00 - 5.20 x10*6/uL    Hemoglobin 7.7 (L) 12.0 - 16.0 g/dL    Hematocrit 23.9 (L) 36.0 - 46.0 %    MCV 95 80 - 100 fL    MCH 30.6 26.0 - 34.0 pg    MCHC 32.2 32.0 - 36.0 g/dL    RDW 12.6 11.5 - 14.5 %    Platelets 160 150 - 450 x10*3/uL   Renal Function Panel   Result Value Ref Range    Glucose 102 (H) 74 - 99 mg/dL    Sodium 140 136 - 145 mmol/L    Potassium 3.1 (L) 3.5 - 5.3 mmol/L    Chloride 109 (H) 98 - 107 mmol/L    Bicarbonate 24 21 - 32 mmol/L    Anion Gap 10 10 - 20 mmol/L    Urea Nitrogen 4 (L) 6 - 23 mg/dL    Creatinine 0.59 0.50 - 1.05 mg/dL    eGFR >90 >60 mL/min/1.73m*2    Calcium 8.2 (L) 8.6 - 10.6 mg/dL    Phosphorus 1.3 (L) 2.5 - 4.9 mg/dL    Albumin 3.0 (L) 3.4 - 5.0 g/dL     No results found.    Current Medications  Scheduled medications  acetaminophen, 650 mg, oral, q6h ADY  amitriptyline, 25 mg, oral, Nightly  aspirin, 81 mg, oral, Daily  docusate sodium, 100 mg, oral, BID  gabapentin, 600 mg, oral, q12h  heparin (porcine), 5,000 Units,  subcutaneous, q8h  methocarbamol, 1,000 mg, intravenous, q8h  nitroglycerin, 0.5 inch, transdermal, q12h      Continuous medications  dextrose 5%-0.45 % sodium chloride, 100 mL/hr, Last Rate: 100 mL/hr (11/12/23 1955)  HYDROmorphone,   ropivacaine (PF) in NS cmpd, 6 mL/hr, Last Rate: 12 mL/hr (11/10/23 1505)  ropivacaine (PF) in NS cmpd, 6 mL/hr      PRN medications  PRN medications: diphenhydrAMINE, magnesium hydroxide, naloxone, naloxone, ondansetron **OR** ondansetron, traMADol, traMADol     Assessment  Cheyanne is a 53 year old female with a past medical history of endometriosis, thyroid nodule, vitamin D deficiency, uterine polyp and 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 Barahona of breast surgery and Dr. Gorman of plastic surgery for bilateral cancer risk reduction mastectomies followed by immediate bilateral ALEKSANDR free flap reconstruction on 11/20/2023.      Plan/Recommendations     # S/p ALEKSANDR free flap reconstruction, complex closure, application wound vac   - Continue serial flap assessments Q2hour per nursing       ·  Assess clinical appearance of flap (color, warmth, turgor)       ·  Nursing to notify plastic surgery team immediately if there are any acute changes          (Including pallor, temperature change, bleeding, etc.)  - Continue use of Cas hugger to breast flap region and surrounding skin         ·  Settings: low heat (36*C), medium continuous fan        ·  5 hours on, 1 hour off x 3 days postoperatively (end date PM 11/13)        ·  Apply ABDs loosely over flap then Cas Hugger blanket to avoid direct contact of warmer with skin/flap  - Daily local wound care/dressing changes per plastics       ·  Xeroform strips to ALEKSANDR flap incision lines at breast changed daily and PRN if soiled          ·  Xeroform over umbilical incision covered by Tegaderm dressing to remain in place until vac removal       ·  Monitor surgical sites (including inferior incision lines) for  s/s of bleeding, infection or dehiscence        ·  Continue to apply nitropaste BID to areas of bruising/discoloration on b/l breasts, plastics to apply AM and PM dose. Use 1/2 packet per breast. Continued use/frequency pending appearance and response of bruising.   - Continue ROBBY drain care per nursing (x1 to R breast, x1 to L breast)       ·  Empty and record output at least every 8 hours       ·  Strip drains TID and PRN to avoid drain obstruction        ·  Removal per plastic surgery team when output <30cc output/24hrs for x2 consecutive days       ·  Call plastics if drain output is >30cc in an hour or greater than 200cc over 8 hours  - Continue incisional wound vac therapy to Pfannenstiel incision at lower abdomen x14 days post op (end date ~11/24)        ·  Settings: 125 mmHg low continuous suction        ·  Please keep dressing C/D/I, reinforce PRN        ·  Please alert plastics team with any concerns regarding vac        ·  Transition to prevena upon DC  - Maintain beach chair positioning at all times        ·  Head elevated and hips flexed at least 60 degrees, knees bent and elevated on pillows   - Avoid positioning that would apply pressure to flap region or incisions   - Patient to maintain strict bedrest postoperatively x 3 days (end date 11/13)        ·  Maintain palomino catheter until off bedrest  - Continue to record and monitor strict I&Os  - S/p x3 doses IV ancef postoperatively   - BP goal of 110/60 minimum to ensure adequate flap perfusion   - SQ Heparin [id number], transition to Lovenox on POD #3 (PM 11/13)   - Daily ASA to start PM POD #1 then continue for x30 days postoperatively (end date 12/11)   - Encourage IS x10 every hour while awake   - Monitor VS/pulse ox [id number]   - Monitor AM CBC/RFP/Mg      # Acute postoperative pain  - Current regimen       ·  Tylenol 650mg PO [id number]        ·  Tramadol 50/100mg PO [id number] PRN moderate/severe pain       ·  Gabapentin 600mg BID        ·  Robaxin 1000mg  IV [id number]        ·  Dilaudid PCA as recommended by acute pain service, plan to wean once able to get out of bed   - Acute pain following, appreciate recs        ·  Ropivacaine pain catheters x2 per anesthesia        ·  Amitriptyline 25mg nightly   - Bowel regimen: colace 100mg BID, Milk of Mg PRN for constipation while taking narcotics   - Benadryl 25mg IV PRN itching while taking narcotics   - Pain assessments every hour while on PCA      # Acute postoperative anemia   - Incoming/baseline HGB ~13 (as of recent labs 11/6/2023)  - AM Hbg 11/13: 7.7 with s/s of lightheadedness   -> transfuse 1u prbc   -> follow up evening labs   - Ensure T&S active, last updated 11/11, next due 11/14   - Monitor for S/S of bleeding or symptomatic anemia   - Monitor AM CBC       F: D5 1/2 NS@ 100ml/hour while waiting for adequate PO intake   E: Replete PRN  N: Regular diet    GI: Continue bowel regimen with Colace BID, Milk of Mg PRN        ·  IV Zofran 4mg PRN for nausea/vomiting      DVT ppx:  - ASA 81 to start PM POD#1 then continue once daily x30 days postoperatively (end date 12/11)  - SQ heparin TID x 3 days then transition to SQ Lovenox on POD#3 (PM 11/13)   - Use of SCDs while on bedrest       Disposition: Continue care on RNF. Will remain inpatient for continued flap monitoring and vac/drain surveillance postoperatively.     Patient and plan discussed with Dr. Mata PA-C  Plastic and Reconstructive Surgery  Epic chat, Pager [phone], Team phones: [phone]

## 2023-11-13 NOTE — PROGRESS NOTES
Physical Therapy                 Therapy Communication Note    Patient Name: Cheyanne Rubio  MRN: [id number]  Today's Date: 11/13/2023     Discipline: Physical Therapy    Missed Visit Reason: Missed Visit Reason: Patient refused (Attempt at 1017. Pt. resting supine in bed, reporting dizziness and nausea at rest. Notified RN. Will hold PT at this time and reattempt as medically appropriate.)    Missed Time: Attempt    Comment:

## 2023-11-13 NOTE — SIGNIFICANT EVENT
Flap checks  21:30  Breast: Bilateral ALEKSANDR free flap recipient site soft, warm to touch, diffuse purple-brown bruising throughout central part of native breast tissue. Flap approximated with intact sutures and incision lines dressed with Xeroform dressing, no evidence of dehiscence. Intact doppler pulses at the upper, lateral corners near the sternum bilaterally. x1 ROBBY drain to each breast with sanguinous output.       23:45  Flap stable in appearance and present doppler signals as above. Gerson-bid paste (1 packet per side) applied to each breast at areas of bruising superior and inferior to incisions     04:40  Flap stable in appearance and present doppler signals as above.     Irma Talley MD  General Surgery  Plastic Surgery [phone]

## 2023-11-13 NOTE — PROGRESS NOTES
"Cheyanne Rubio is a 53 y.o. female on day 3 of admission s/p bilateral prophylactic mastectomies and autologous reconstruction.     Subjective   No acute overnight events. Seen on morning rounds. Pain well controlled with oral medication. Left flap developed some bruising overnight. Plastics team applying nitropaste. Patient reports some dizziness and fatigue. Plastics transfusing 1unit pRBC for 1 point drop in Hgb and symptoms.     Objective   Constitutional: Awake, alert, no acute distress  Neurological: Numbness in bilateral upper extremities improved.   Eyes: Non-icteric  Respiratory: No increased work of breathing on RA  Cardiac: Regular rate  Chest: Bilateral ALEKSANDR flaps soft, warm, some ecchymosis, L >R. Bilateral ROBBY drains with serosanguinous drainage  Abdomen: Soft, nontender, nondistended. Prevena vac in place holding suction.   Genitourinary: Parker in place draining clear yellow urine  Skin: Warm, dry  Musculoskeletal: Moves all extremities  Extremities: No peripheral edema         Psychological: Appropriate mood/affect     Last Recorded Vitals  Blood pressure 102/69, pulse 68, temperature 35.7 °C (96.3 °F), temperature source Temporal, resp. rate 16, height 1.702 m (5' 7.01\"), weight 90.3 kg (199 lb 1.2 oz), SpO2 96 %.  Intake/Output last 3 Shifts:  I/O last 3 completed shifts:  In: 5950 (65.9 mL/kg) [I.V.:1150 (12.7 mL/kg); IV Piggyback:4800]  Out: 4800 (53.2 mL/kg) [Urine:4585 (1.4 mL/kg/hr); Drains:65; Blood:150]  Weight: 90.3 kg      Relevant Results        Results for orders placed or performed during the hospital encounter of 11/10/23 (from the past 24 hour(s))   Blood Gas Arterial Full Panel   Result Value Ref Range     POCT pH, Arterial 7.34 (L) 7.38 - 7.42 pH     POCT pCO2, Arterial 44 (H) 38 - 42 mm Hg     POCT pO2, Arterial 137 (H) 85 - 95 mm Hg     POCT SO2, Arterial 97 94 - 100 %     POCT Oxy Hemoglobin, Arterial 97.0 94.0 - 98.0 %     POCT Hematocrit Calculated, Arterial 39.0 36.0 - 46.0 %     " POCT Sodium, Arterial 140 136 - 145 mmol/L     POCT Potassium, Arterial 4.1 3.5 - 5.3 mmol/L     POCT Chloride, Arterial 109 (H) 98 - 107 mmol/L     POCT Ionized Calcium, Arterial 1.24 1.10 - 1.33 mmol/L     POCT Glucose, Arterial 186 (H) 74 - 99 mg/dL     POCT Lactate, Arterial 1.3 0.4 - 2.0 mmol/L     POCT Base Excess, Arterial -2.2 (L) -2.0 - 3.0 mmol/L     POCT HCO3 Calculated, Arterial 23.7 22.0 - 26.0 mmol/L     POCT Hemoglobin, Arterial 13.0 12.0 - 16.0 g/dL     POCT Anion Gap, Arterial 11 10 - 25 mmo/L     Patient Temperature 37.0 degrees Celsius     FiO2 50 %   CBC   Result Value Ref Range     WBC 14.0 (H) 4.4 - 11.3 x10*3/uL     nRBC 0.0 0.0 - 0.0 /100 WBCs     RBC 3.38 (L) 4.00 - 5.20 x10*6/uL     Hemoglobin 10.6 (L) 12.0 - 16.0 g/dL     Hematocrit 30.9 (L) 36.0 - 46.0 %     MCV 91 80 - 100 fL     MCH 31.4 26.0 - 34.0 pg     MCHC 34.3 32.0 - 36.0 g/dL     RDW 12.2 11.5 - 14.5 %     Platelets 175 150 - 450 x10*3/uL   Renal function panel   Result Value Ref Range     Glucose 132 (H) 74 - 99 mg/dL     Sodium 147 (H) 136 - 145 mmol/L     Potassium 3.7 3.5 - 5.3 mmol/L     Chloride 112 (H) 98 - 107 mmol/L     Bicarbonate 26 21 - 32 mmol/L     Anion Gap 13 10 - 20 mmol/L     Urea Nitrogen 9 6 - 23 mg/dL     Creatinine 0.71 0.50 - 1.05 mg/dL     eGFR >90 >60 mL/min/1.73m*2     Calcium 8.8 8.6 - 10.6 mg/dL     Phosphorus 3.5 2.5 - 4.9 mg/dL     Albumin 4.0 3.4 - 5.0 g/dL   Magnesium   Result Value Ref Range     Magnesium 1.73 1.60 - 2.40 mg/dL   CBC   Result Value Ref Range     WBC 12.6 (H) 4.4 - 11.3 x10*3/uL     nRBC 0.0 0.0 - 0.0 /100 WBCs     RBC 3.17 (L) 4.00 - 5.20 x10*6/uL     Hemoglobin 9.8 (L) 12.0 - 16.0 g/dL     Hematocrit 27.9 (L) 36.0 - 46.0 %     MCV 88 80 - 100 fL     MCH 30.9 26.0 - 34.0 pg     MCHC 35.1 32.0 - 36.0 g/dL     RDW 12.4 11.5 - 14.5 %     Platelets 192 150 - 450 x10*3/uL        Assessment/Plan   Principal Problem:    Monoallelic mutation of PALB2 gene  Active Problems:    Status post  breast reconstruction    PONV (postoperative nausea and vomiting)    Recent URI     Cheyanne Rubio is a 53 year old F s/p bilateral risk reducing mastectomy with Dr. Barahona and bilateral ALEKSANDR reconstruction with Dr. Gorman for PALB2 mutation on 11/10. Patient is recovering well post operatively.      - Plastics primary  - Pain management, drains, wound care per Plastics     Discussed with attending surgeon, Dr. Barahona.      Nik Lyons MD  PGY3   General Surgery   Breast Surgery pager [phone]

## 2023-11-13 NOTE — PROGRESS NOTES
Acute Pain Service    Postop Pain HPI -   Palliative: relieved with IV analgesics and regional local anesthetics  Provocative: movement  Quality:  burning and aching  Radiation:  none  Severity:  5/10  Timing: constant    24-HOUR OPIOID CONSUMPTION:  Tramadol 50mg x2, 100mg x3    Scheduled medications  acetaminophen, 650 mg, oral, q6h ADY  amitriptyline, 25 mg, oral, Nightly  aspirin, 81 mg, oral, Daily  docusate sodium, 100 mg, oral, BID  enoxaparin, 40 mg, subcutaneous, q24h ADY  gabapentin, 600 mg, oral, q12h  methocarbamol, 1,000 mg, intravenous, q8h  nitroglycerin, 0.5 inch, transdermal, q12h      Continuous medications  dextrose 5%-0.45 % sodium chloride, 100 mL/hr, Last Rate: 100 mL/hr (11/12/23 1955)  HYDROmorphone,   ropivacaine (PF) in NS cmpd, 6 mL/hr, Last Rate: 12 mL/hr (11/10/23 1505)  ropivacaine (PF) in NS cmpd, 6 mL/hr      PRN medications  PRN medications: diphenhydrAMINE, magnesium hydroxide, naloxone, naloxone, ondansetron **OR** ondansetron, traMADol, traMADol     Physical Exam:  Constitutional:  no distress, alert and cooperative  Eyes: clear sclera  Head/Neck: No apparent injury, trachea midline  Respiratory/Thorax: Patent airways, thorax symmetric, breathing comfortably  Cardiovascular: no pitting edema  Gastrointestinal: Nondistended  Musculoskeletal: ROM intact  Extremities: no clubbing  Neurological: alert, perez x4  Psychological: Appropriate affect    Results for orders placed or performed during the hospital encounter of 11/10/23 (from the past 24 hour(s))   POCT GLUCOSE   Result Value Ref Range    POCT Glucose 93 74 - 99 mg/dL   Magnesium   Result Value Ref Range    Magnesium 1.96 1.60 - 2.40 mg/dL   CBC   Result Value Ref Range    WBC 7.5 4.4 - 11.3 x10*3/uL    nRBC 0.0 0.0 - 0.0 /100 WBCs    RBC 2.52 (L) 4.00 - 5.20 x10*6/uL    Hemoglobin 7.7 (L) 12.0 - 16.0 g/dL    Hematocrit 23.9 (L) 36.0 - 46.0 %    MCV 95 80 - 100 fL    MCH 30.6 26.0 - 34.0 pg    MCHC 32.2 32.0 - 36.0 g/dL    RDW  12.6 11.5 - 14.5 %    Platelets 160 150 - 450 x10*3/uL   Renal Function Panel   Result Value Ref Range    Glucose 102 (H) 74 - 99 mg/dL    Sodium 140 136 - 145 mmol/L    Potassium 3.1 (L) 3.5 - 5.3 mmol/L    Chloride 109 (H) 98 - 107 mmol/L    Bicarbonate 24 21 - 32 mmol/L    Anion Gap 10 10 - 20 mmol/L    Urea Nitrogen 4 (L) 6 - 23 mg/dL    Creatinine 0.59 0.50 - 1.05 mg/dL    eGFR >90 >60 mL/min/1.73m*2    Calcium 8.2 (L) 8.6 - 10.6 mg/dL    Phosphorus 1.3 (L) 2.5 - 4.9 mg/dL    Albumin 3.0 (L) 3.4 - 5.0 g/dL        Plan:  - All JACE catheters Dcd today  - Rest of pain management per primary team      Thank you for allowing us to participate in patients care. Acute pain team will sign off.

## 2023-11-13 NOTE — CARE PLAN
Problem: Pain  Goal: Takes deep breaths with improved pain control throughout the shift  Outcome: Progressing  Goal: Turns in bed with improved pain control throughout the shift  Outcome: Progressing  Goal: Walks with improved pain control throughout the shift  Outcome: Progressing  Goal: Performs ADL's with improved pain control throughout shift  Outcome: Progressing  Goal: Participates in PT with improved pain control throughout the shift  Outcome: Progressing  Goal: Free from opioid side effects throughout the shift  Outcome: Progressing  Goal: Free from acute confusion related to pain meds throughout the shift  Outcome: Progressing   The patient's goals for the shift include      The clinical goals for the shift include pt will report pain <8 duriing shift

## 2023-11-13 NOTE — PROGRESS NOTES
"Physical Therapy                 Therapy Communication Note    Patient Name: Cheyanne Rubio  MRN: [id number]  Today's Date: 11/13/2023     Discipline: Physical Therapy    Missed Visit Reason: Missed Visit Reason: Patient refused (Attempt 1417. Pt. reporting \"I still feel dizzy and I haven't gotten blood yet\". Pt. requesting to hold PT today. 2nd attempt this date.)    Missed Time: Attempt    Comment:  "

## 2023-11-13 NOTE — CARE PLAN
The patient's goals for the shift include      The clinical goals for the shift include pain control    Very pleasant 52 yo female. VSS AOX4. S/P breast surgery. Incision intact covered with xeroform.  Donor site - abd black foam with wound vac at 125 hg/mm. Covered with occlusive dressing. Q pump x 2.  ROBBY x2 drain serosanguinous drainage. Bear hugger 5 hours on and one hour off. Two hour flap checks on even hours. Parker patent. Refusing any opioids. Given tramadol, tylenol, gabapentin and robaxin for pain management. Family at bedside most of the day. Great support system and very active in pt care.

## 2023-11-14 LAB
ALBUMIN SERPL BCP-MCNC: 3.1 G/DL (ref 3.4–5)
ANION GAP SERPL CALC-SCNC: 9 MMOL/L (ref 10–20)
BUN SERPL-MCNC: 5 MG/DL (ref 6–23)
CALCIUM SERPL-MCNC: 8.3 MG/DL (ref 8.6–10.6)
CHLORIDE SERPL-SCNC: 108 MMOL/L (ref 98–107)
CO2 SERPL-SCNC: 26 MMOL/L (ref 21–32)
CREAT SERPL-MCNC: 0.53 MG/DL (ref 0.5–1.05)
ERYTHROCYTE [DISTWIDTH] IN BLOOD BY AUTOMATED COUNT: 13 % (ref 11.5–14.5)
ERYTHROCYTE [DISTWIDTH] IN BLOOD BY AUTOMATED COUNT: 13 % (ref 11.5–14.5)
GFR SERPL CREATININE-BSD FRML MDRD: >90 ML/MIN/1.73M*2
GLUCOSE SERPL-MCNC: 125 MG/DL (ref 74–99)
HCT VFR BLD AUTO: 28.1 % (ref 36–46)
HCT VFR BLD AUTO: 28.4 % (ref 36–46)
HGB BLD-MCNC: 9.1 G/DL (ref 12–16)
HGB BLD-MCNC: 9.5 G/DL (ref 12–16)
MAGNESIUM SERPL-MCNC: 1.97 MG/DL (ref 1.6–2.4)
MCH RBC QN AUTO: 30.3 PG (ref 26–34)
MCH RBC QN AUTO: 31 PG (ref 26–34)
MCHC RBC AUTO-ENTMCNC: 32.4 G/DL (ref 32–36)
MCHC RBC AUTO-ENTMCNC: 33.5 G/DL (ref 32–36)
MCV RBC AUTO: 93 FL (ref 80–100)
MCV RBC AUTO: 94 FL (ref 80–100)
NRBC BLD-RTO: 0 /100 WBCS (ref 0–0)
NRBC BLD-RTO: 0 /100 WBCS (ref 0–0)
PHOSPHATE SERPL-MCNC: 1.6 MG/DL (ref 2.5–4.9)
PLATELET # BLD AUTO: 188 X10*3/UL (ref 150–450)
PLATELET # BLD AUTO: 191 X10*3/UL (ref 150–450)
POTASSIUM SERPL-SCNC: 3.4 MMOL/L (ref 3.5–5.3)
RBC # BLD AUTO: 3 X10*6/UL (ref 4–5.2)
RBC # BLD AUTO: 3.06 X10*6/UL (ref 4–5.2)
SODIUM SERPL-SCNC: 140 MMOL/L (ref 136–145)
WBC # BLD AUTO: 7.1 X10*3/UL (ref 4.4–11.3)
WBC # BLD AUTO: 8 X10*3/UL (ref 4.4–11.3)

## 2023-11-14 PROCEDURE — 2500000004 HC RX 250 GENERAL PHARMACY W/ HCPCS (ALT 636 FOR OP/ED)

## 2023-11-14 PROCEDURE — 36415 COLL VENOUS BLD VENIPUNCTURE: CPT | Performed by: PHYSICIAN ASSISTANT

## 2023-11-14 PROCEDURE — 80069 RENAL FUNCTION PANEL: CPT | Performed by: PHYSICIAN ASSISTANT

## 2023-11-14 PROCEDURE — 97161 PT EVAL LOW COMPLEX 20 MIN: CPT | Mod: GP

## 2023-11-14 PROCEDURE — 2500000004 HC RX 250 GENERAL PHARMACY W/ HCPCS (ALT 636 FOR OP/ED): Performed by: STUDENT IN AN ORGANIZED HEALTH CARE EDUCATION/TRAINING PROGRAM

## 2023-11-14 PROCEDURE — 99231 SBSQ HOSP IP/OBS SF/LOW 25: CPT

## 2023-11-14 PROCEDURE — 1170000001 HC PRIVATE ONCOLOGY ROOM DAILY

## 2023-11-14 PROCEDURE — 85027 COMPLETE CBC AUTOMATED: CPT | Performed by: PHYSICIAN ASSISTANT

## 2023-11-14 PROCEDURE — 2500000005 HC RX 250 GENERAL PHARMACY W/O HCPCS

## 2023-11-14 PROCEDURE — 2500000001 HC RX 250 WO HCPCS SELF ADMINISTERED DRUGS (ALT 637 FOR MEDICARE OP): Performed by: NURSE PRACTITIONER

## 2023-11-14 PROCEDURE — 2500000001 HC RX 250 WO HCPCS SELF ADMINISTERED DRUGS (ALT 637 FOR MEDICARE OP): Performed by: ANESTHESIOLOGY

## 2023-11-14 PROCEDURE — 97530 THERAPEUTIC ACTIVITIES: CPT | Mod: GP

## 2023-11-14 PROCEDURE — 83735 ASSAY OF MAGNESIUM: CPT | Performed by: PHYSICIAN ASSISTANT

## 2023-11-14 PROCEDURE — 2500000004 HC RX 250 GENERAL PHARMACY W/ HCPCS (ALT 636 FOR OP/ED): Performed by: NURSE PRACTITIONER

## 2023-11-14 PROCEDURE — 96372 THER/PROPH/DIAG INJ SC/IM: CPT

## 2023-11-14 RX ORDER — POTASSIUM CHLORIDE 20 MEQ/1
40 TABLET, EXTENDED RELEASE ORAL ONCE
Status: COMPLETED | OUTPATIENT
Start: 2023-11-14 | End: 2023-11-14

## 2023-11-14 RX ORDER — LIDOCAINE 560 MG/1
1 PATCH PERCUTANEOUS; TOPICAL; TRANSDERMAL DAILY
Status: DISCONTINUED | OUTPATIENT
Start: 2023-11-14 | End: 2023-11-15

## 2023-11-14 RX ORDER — POTASSIUM CHLORIDE 20 MEQ/1
40 TABLET, EXTENDED RELEASE ORAL ONCE
Status: DISCONTINUED | OUTPATIENT
Start: 2023-11-14 | End: 2023-11-14

## 2023-11-14 RX ADMIN — GABAPENTIN 600 MG: 300 CAPSULE ORAL at 06:02

## 2023-11-14 RX ADMIN — AMITRIPTYLINE HYDROCHLORIDE 25 MG: 25 TABLET, FILM COATED ORAL at 20:23

## 2023-11-14 RX ADMIN — METHOCARBAMOL 1000 MG: 100 INJECTION INTRAMUSCULAR; INTRAVENOUS at 12:44

## 2023-11-14 RX ADMIN — METHOCARBAMOL 1000 MG: 100 INJECTION INTRAMUSCULAR; INTRAVENOUS at 20:23

## 2023-11-14 RX ADMIN — GABAPENTIN 600 MG: 300 CAPSULE ORAL at 18:16

## 2023-11-14 RX ADMIN — ASPIRIN 81 MG: 81 TABLET, COATED ORAL at 08:07

## 2023-11-14 RX ADMIN — DOCUSATE SODIUM 100 MG: 100 CAPSULE, LIQUID FILLED ORAL at 20:23

## 2023-11-14 RX ADMIN — TRAMADOL HYDROCHLORIDE 100 MG: 50 TABLET, COATED ORAL at 08:07

## 2023-11-14 RX ADMIN — ONDANSETRON 4 MG: 2 INJECTION INTRAMUSCULAR; INTRAVENOUS at 02:57

## 2023-11-14 RX ADMIN — METHOCARBAMOL 1000 MG: 100 INJECTION INTRAMUSCULAR; INTRAVENOUS at 06:02

## 2023-11-14 RX ADMIN — ACETAMINOPHEN 650 MG: 325 TABLET ORAL at 00:37

## 2023-11-14 RX ADMIN — POTASSIUM CHLORIDE 40 MEQ: 1500 TABLET, EXTENDED RELEASE ORAL at 11:02

## 2023-11-14 RX ADMIN — ACETAMINOPHEN 650 MG: 325 TABLET ORAL at 11:02

## 2023-11-14 RX ADMIN — ACETAMINOPHEN 650 MG: 325 TABLET ORAL at 18:16

## 2023-11-14 RX ADMIN — LIDOCAINE 1 PATCH: 4 PATCH TOPICAL at 12:44

## 2023-11-14 RX ADMIN — DEXTROSE AND SODIUM CHLORIDE 100 ML/HR: 5; 450 INJECTION, SOLUTION INTRAVENOUS at 03:55

## 2023-11-14 RX ADMIN — TRAMADOL HYDROCHLORIDE 50 MG: 50 TABLET, COATED ORAL at 02:57

## 2023-11-14 RX ADMIN — ACETAMINOPHEN 650 MG: 325 TABLET ORAL at 23:38

## 2023-11-14 RX ADMIN — ACETAMINOPHEN 650 MG: 325 TABLET ORAL at 06:02

## 2023-11-14 RX ADMIN — DOCUSATE SODIUM 100 MG: 100 CAPSULE, LIQUID FILLED ORAL at 08:07

## 2023-11-14 RX ADMIN — TRAMADOL HYDROCHLORIDE 100 MG: 50 TABLET, COATED ORAL at 16:19

## 2023-11-14 RX ADMIN — ENOXAPARIN SODIUM 40 MG: 100 INJECTION SUBCUTANEOUS at 08:07

## 2023-11-14 RX ADMIN — ONDANSETRON 4 MG: 2 INJECTION INTRAMUSCULAR; INTRAVENOUS at 11:02

## 2023-11-14 ASSESSMENT — COGNITIVE AND FUNCTIONAL STATUS - GENERAL
WALKING IN HOSPITAL ROOM: A LITTLE
DAILY ACTIVITIY SCORE: 18
DRESSING REGULAR LOWER BODY CLOTHING: A LITTLE
STANDING UP FROM CHAIR USING ARMS: A LITTLE
MOVING FROM LYING ON BACK TO SITTING ON SIDE OF FLAT BED WITH BEDRAILS: A LITTLE
HELP NEEDED FOR BATHING: A LITTLE
MOVING FROM LYING ON BACK TO SITTING ON SIDE OF FLAT BED WITH BEDRAILS: A LOT
TURNING FROM BACK TO SIDE WHILE IN FLAT BAD: A LOT
DRESSING REGULAR UPPER BODY CLOTHING: A LITTLE
STANDING UP FROM CHAIR USING ARMS: A LITTLE
DRESSING REGULAR LOWER BODY CLOTHING: A LITTLE
EATING MEALS: A LITTLE
CLIMB 3 TO 5 STEPS WITH RAILING: A LITTLE
WALKING IN HOSPITAL ROOM: A LITTLE
CLIMB 3 TO 5 STEPS WITH RAILING: A LOT
MOVING TO AND FROM BED TO CHAIR: A LITTLE
CLIMB 3 TO 5 STEPS WITH RAILING: A LOT
PERSONAL GROOMING: A LITTLE
MOBILITY SCORE: 15
MOBILITY SCORE: 18
TOILETING: A LITTLE
TURNING FROM BACK TO SIDE WHILE IN FLAT BAD: A LOT
MOVING FROM LYING ON BACK TO SITTING ON SIDE OF FLAT BED WITH BEDRAILS: A LOT
MOBILITY SCORE: 15
EATING MEALS: A LITTLE
DAILY ACTIVITIY SCORE: 18
TOILETING: A LITTLE
MOVING TO AND FROM BED TO CHAIR: A LITTLE
DRESSING REGULAR UPPER BODY CLOTHING: A LITTLE
STANDING UP FROM CHAIR USING ARMS: A LITTLE
PERSONAL GROOMING: A LITTLE
WALKING IN HOSPITAL ROOM: A LITTLE
HELP NEEDED FOR BATHING: A LITTLE
TURNING FROM BACK TO SIDE WHILE IN FLAT BAD: A LITTLE
MOVING TO AND FROM BED TO CHAIR: A LITTLE

## 2023-11-14 ASSESSMENT — PAIN - FUNCTIONAL ASSESSMENT
PAIN_FUNCTIONAL_ASSESSMENT: 0-10

## 2023-11-14 ASSESSMENT — ACTIVITIES OF DAILY LIVING (ADL): ADL_ASSISTANCE: INDEPENDENT

## 2023-11-14 ASSESSMENT — PAIN SCALES - GENERAL
PAINLEVEL_OUTOF10: 4
PAINLEVEL_OUTOF10: 5 - MODERATE PAIN
PAINLEVEL_OUTOF10: 4
PAINLEVEL_OUTOF10: 10 - WORST POSSIBLE PAIN
PAINLEVEL_OUTOF10: 7
PAINLEVEL_OUTOF10: 7

## 2023-11-14 ASSESSMENT — PAIN DESCRIPTION - DESCRIPTORS: DESCRIPTORS: DISCOMFORT;ACHING

## 2023-11-14 NOTE — PROGRESS NOTES
Physical Therapy    Physical Therapy Evaluation & Treatment    Patient Name: Cheyanne Rubio  MRN: [id number]  Today's Date: 11/14/2023   Time Calculation  Start Time: 0925  Stop Time: 0959  Time Calculation (min): 34 min    Assessment/Plan   PT Assessment  PT Assessment Results: Decreased strength, Decreased endurance, Decreased range of motion, Impaired balance, Decreased mobility, Pain  Rehab Prognosis: Good  Evaluation/Treatment Tolerance: Patient limited by pain, Patient limited by fatigue (+dizziness)  Medical Staff Made Aware: Yes  Strengths: Ability to acquire knowledge, Attitude of self, Premorbid level of function, Support and attitude of living partners  End of Session Communication: Bedside nurse  Assessment Comment: Pt. is a 53 yof that presents with increased pain, decreased functional strength, decreased activity tolerance/endurance, impaired balance, and increased difficulty with mobility compared to baseline. Pt. will benefit from skilled PT intervention while inpatient to address the above deficits.  End of Session Patient Position: Up in chair, Alarm off, caregiver present (RN and Spouse present in room)  IP OR SWING BED PT PLAN  Inpatient or Swing Bed: Inpatient  PT Plan  Treatment/Interventions: Bed mobility, Transfer training, Stair training, Gait training, Balance training, Strengthening, Endurance training, Range of motion, Therapeutic exercise, Therapeutic activity, Home exercise program, Positioning  PT Plan: Skilled PT  PT Frequency: 3 times per week  PT Discharge Recommendations: Low intensity level of continued care (with assist from spouse)  Equipment Recommended upon Discharge: Wheeled walker  PT Recommended Transfer Status: Assist x1, Assistive device  PT - OK to Discharge: Yes (Eval complete, refer to dispo)      Subjective     General Visit Information:  General  Reason for Referral: s/p bilateral risk reducing mastectomy with Dr. Barahona and bilateral ALEKSANDR reconstruction with Dr. Gorman  for PALB2 mutation on 11/10  Past Medical History Relevant to Rehab: endometriosis, thyroid nodule, vitamin D deficiency, uterine polyp and monoalleclic mutation of the PALB 2 gene  Family/Caregiver Present: Yes  Caregiver Feedback: Spouse, supportive  Prior to Session Communication: Bedside nurse  Patient Position Received: Bed, 3 rail up, Alarm off, not on at start of session  Preferred Learning Style: verbal, visual  General Comment: Pt. received in bed, pleasant and agreeable to participate in session.    Home Living:  Home Living  Type of Home: House  Lives With: Spouse  Home Adaptive Equipment: Cane (knee scooter)  Home Layout: Multi-level  Home Access: Stairs to enter without rails  Entrance Stairs-Rails: None  Entrance Stairs-Number of Steps: 2  Bathroom Shower/Tub: Walk-in shower (upstairs)  Bathroom Equipment: Shower chair with back  Home Living Comments: Pt. reports plays to stay on second level and sleep in lift chair, must traverse 10-15 steps    Prior Level of Function:  Prior Function Per Pt/Caregiver Report  Level of [place]: Independent with ADLs and functional transfers, Independent with homemaking with ambulation  ADL Assistance: Independent  Homemaking Assistance: Independent  Ambulatory Assistance: Independent  Vocational: Full time employment  Leisure: Travelling  Prior Function Comments: no hx of falls    Precautions:  Precautions  Medical Precautions: Abdominal precautions, Fall precautions  Post-Surgical Precautions: Abdominal surgery precautions  Precautions Comment: Head elevated and hips flexed at least 60 degrees, knees bent and elevated on pillows    Vital Signs:  Vital Signs  Heart Rate: 73  Heart Rate Source: Monitor  SpO2: 98 % (room air)  BP: 102/68  Patient Position: Sitting (end of session)    Objective   Pain:  Pain Assessment  Pain Assessment: 0-10  Pain Score:  (6.5)  Pain Type: Surgical pain  Pain Location: Abdomen  Pain Orientation: Right  Pain Descriptors: Discomfort,  Aching    Cognition:  Cognition  Overall Cognitive Status: Within Functional Limits  Orientation Level: Oriented X4    General Assessments:  Activity Tolerance  Endurance: Tolerates 10 - 20 min exercise with multiple rests  Early Mobility/Exercise Safety Screen: Proceed with mobilization - No exclusion criteria met  Activity Tolerance Comments: Pt. tolerated ambulating short distance this date, limited by pain and dizziness    Sensation  Light Touch:  (Pt. reported decreased sensation in donya hands, reports has been ongoing since surgery)    Postural Control  Postural Control: Within Functional Limits    Static Sitting Balance  Static Sitting-Comment/Number of Minutes: CGA  Dynamic Sitting Balance  Dynamic Sitting-Comments: CGA    Static Standing Balance  Static Standing-Comment/Number of Minutes: CGAx1 with FWW  Dynamic Standing Balance  Dynamic Standing-Comments: CGAx1 with FWW    Functional Assessments:  Bed Mobility  Bed Mobility: Yes  Bed Mobility 1  Bed Mobility 1: Supine to sitting  Level of Assistance 1: Moderate assistance, Moderate verbal cues  Bed Mobility Comments 1: log roll technique, HOB elevated, increased assist required to upright trunk    Transfers  Transfer: Yes  Transfer 1  Transfer From 1: Bed to  Transfer to 1: Stand  Technique 1: Sit to stand  Transfer Device 1: Walker  Transfer Level of Assistance 1: Minimum assistance, Minimal verbal cues  Trials/Comments 1: Cues for proper UE placement and technique  Transfers 2  Transfer From 2: Stand to  Transfer to 2: Chair with arms  Technique 2: Stand to sit  Transfer Device 2: Walker  Transfer Level of Assistance 2: Minimum assistance, Minimal verbal cues  Trials/Comments 2: Cues to reach posteriorly to maximize stability and safety with descent    Ambulation/Gait Training  Ambulation/Gait Training Performed: Yes  Ambulation/Gait Training 1  Surface 1: Level tile  Device 1: Rolling walker  Assistance 1: Contact guard, Minimal verbal cues  Quality of  Gait 1:  (decreased joel, forward flexed posture as per precautions, decreased step length donya)  Comments/Distance (ft) 1: 10 ft (Cues for AD management and sequencing)    Stairs  Stairs: No    Extremity/Trunk Assessments:  RLE   RLE : Within Functional Limits  LLE   LLE : Within Functional Limits  Treatments:  Educated pt on abdominal precautions and log roll technique proper technique for bed mobility. Pt. tolerated sitting EOB ~8 min to assess upright tolerance prior to mobilizing OOB. Emphasis on breathing technique. Educated pt on hand placement and technique during transfers. Pt completed ambulation x10 ft with front wheeled walker and CGAx1.  Instructed pt in proper sequencing of gait with walker. Encouraged pursed-lip breathing during activity and promoted upright functional posture. Provided skillful monitoring of vital signs throughout mobility to ensure safety with continued activity.     Therapeutic Activity  Therapeutic Activity Performed: Yes  Therapeutic Activity 1: Pt. tolerated sitting EOB ~8 min with minAx1 initially though progressed to CGAx1 to assess upright tolerance. Emphasis on PLB.    Outcome Measures:  [facility] Basic Mobility  Turning from your back to your side while in a flat bed without using bedrails: A lot  Moving from lying on your back to sitting on the side of a flat bed without using bedrails: A lot  Moving to and from bed to chair (including a wheelchair): A little  Standing up from a chair using your arms (e.g. wheelchair or bedside chair): A little  To walk in hospital room: A little  Climbing 3-5 steps with railing: A lot  Basic Mobility - Total Score: 15    Encounter Problems       Encounter Problems (Active)       Balance       Patient to demo static standing with unilateral UE support, performing single UE task with SBA, no sway or LOB x 2 mins for functional carryover        Start:  11/14/23    Expected End:  11/28/23               Mobility       STG - Patient will ambulate  >/= 50' Tamiko with LRAD       Start:  11/14/23    Expected End:  11/28/23            Patient to ascend/descend >/= 2 stairs with CGAx1 to demo ability to safely traverse KATIE. Progress as tolerated as pt. has 10-15 stairs within home.        Start:  11/14/23    Expected End:  11/28/23               Transfers       STG - Patient will perform bed mobility Tamiko       Start:  11/14/23    Expected End:  11/28/23            STG - Patient will transfer sit to and from stand Tamiko with LRAD       Start:  11/14/23    Expected End:  11/28/23                   Education Documentation  Precautions, taught by Eusebia Quijano, PT at 11/14/2023 11:08 AM.  Learner: Significant Other, Patient  Readiness: Acceptance  Method: Explanation, Demonstration  Response: Verbalizes Understanding, Demonstrated Understanding    Body Mechanics, taught by Eusebia Quijano, PT at 11/14/2023 11:08 AM.  Learner: Significant Other, Patient  Readiness: Acceptance  Method: Explanation, Demonstration  Response: Verbalizes Understanding, Demonstrated Understanding    Mobility Training, taught by Eusebia Quijano, PT at 11/14/2023 11:08 AM.  Learner: Significant Other, Patient  Readiness: Acceptance  Method: Explanation, Demonstration  Response: Verbalizes Understanding, Demonstrated Understanding    Education Comments  No comments found.

## 2023-11-14 NOTE — CARE PLAN
Please review E-advice below. Patient had PSA, CBC and testosterone labs drawn 10/21/81482. Any labs you would like drawn. Or should patient come in for visit?    The patient's goals for the shift include      The clinical goals for the shift include pt will report pain <8 duriing shift    VSS AOX4. Skin on breasts appear much less purple then previous day. Orders to stop the nitroglycerin paste. Received one unit PRBCs. Dc'd R PIV due to redness and pain in area. MD aware. Applied warm pad to area. Continue bear hugger 5 on and 1 off. Q balls removed. Continue wound vac at 125 mm/hg. Has PCA but does not use it. Flap checks continued at q 2 hours.

## 2023-11-14 NOTE — PROGRESS NOTES
Plastic Surgery Progress Note    Patient Name: Cheyanne Rubio  MRN: [id number]  Date:  11/14/23     Subjective  Found resting in bed comfortably sitting up in chair. Worked with PT this morning and it went well. Continues to endorse abdominal pain along the vac but has not been using her PCA pump. Bruising to bilateral breasts significantly improved aside from some residual bruising underneath bilateral breasts. Notes tenderness to ROBBY exit sites. Last BM Thurs 11/9. +flatus. Denies any fever, chills, night sweats, CP, SOB, palpitations, nausea, vomiting.    Overnight Events  NAOE    Objective    Physical Exam   Constitutional: NAD.   Eyes: Clear sclera. EOMI.   ENMT: Moist mucous membranes, no apparent injuries or lesions.  Head/Neck: NCAT.  Cardiovascular: RRR.  Respiratory/Thorax: Unlabored respirations on RA. Symmetric chest rise.   Gastrointestinal: Abdomen soft, non-distended. Prevena vac in place, holding suction at -125 mmHg, no leaks or obstruction, umbilical region covered with xeroform and Aquacel Ag. Diffusely tender to palpation, particularly along wound vac.   Genitourinary: Indwelling palomino in place with clear yellow output.   Neurological: A&Ox3.   Skin: Warm and dry with no lesions or rashes.   Breast: Bilateral ALEKSANDR free flap recipient site soft, warm to touch, diffuse bruising throughout central part of native breast tissue which has significantly improved on exam with some residual bruising beneath bilat breasts. X1 blister to R breast between incision and bruising. Linear skin tear to R IMF fold medially. Flap approximated with intact sutures and incision lines dressed with Xeroform dressing, no evidence of dehiscence. Intact doppler pulses at the upper, medial corners near the sternum bilaterally. x1 ROBBY drain to each breast with ssg output.   Diagnostics   Results for orders placed or performed during the hospital encounter of 11/10/23 (from the past 24 hour(s))   CBC   Result Value Ref Range    WBC  8.0 4.4 - 11.3 x10*3/uL    nRBC 0.0 0.0 - 0.0 /100 WBCs    RBC 3.06 (L) 4.00 - 5.20 x10*6/uL    Hemoglobin 9.5 (L) 12.0 - 16.0 g/dL    Hematocrit 28.4 (L) 36.0 - 46.0 %    MCV 93 80 - 100 fL    MCH 31.0 26.0 - 34.0 pg    MCHC 33.5 32.0 - 36.0 g/dL    RDW 13.0 11.5 - 14.5 %    Platelets 188 150 - 450 x10*3/uL   Magnesium   Result Value Ref Range    Magnesium 1.97 1.60 - 2.40 mg/dL   CBC   Result Value Ref Range    WBC 7.1 4.4 - 11.3 x10*3/uL    nRBC 0.0 0.0 - 0.0 /100 WBCs    RBC 3.00 (L) 4.00 - 5.20 x10*6/uL    Hemoglobin 9.1 (L) 12.0 - 16.0 g/dL    Hematocrit 28.1 (L) 36.0 - 46.0 %    MCV 94 80 - 100 fL    MCH 30.3 26.0 - 34.0 pg    MCHC 32.4 32.0 - 36.0 g/dL    RDW 13.0 11.5 - 14.5 %    Platelets 191 150 - 450 x10*3/uL   Renal Function Panel   Result Value Ref Range    Glucose 125 (H) 74 - 99 mg/dL    Sodium 140 136 - 145 mmol/L    Potassium 3.4 (L) 3.5 - 5.3 mmol/L    Chloride 108 (H) 98 - 107 mmol/L    Bicarbonate 26 21 - 32 mmol/L    Anion Gap 9 (L) 10 - 20 mmol/L    Urea Nitrogen 5 (L) 6 - 23 mg/dL    Creatinine 0.53 0.50 - 1.05 mg/dL    eGFR >90 >60 mL/min/1.73m*2    Calcium 8.3 (L) 8.6 - 10.6 mg/dL    Phosphorus 1.6 (L) 2.5 - 4.9 mg/dL    Albumin 3.1 (L) 3.4 - 5.0 g/dL     No results found.    Current Medications  Scheduled medications  acetaminophen, 650 mg, oral, q6h ADY  amitriptyline, 25 mg, oral, Nightly  aspirin, 81 mg, oral, Daily  docusate sodium, 100 mg, oral, BID  enoxaparin, 40 mg, subcutaneous, q24h ADY  gabapentin, 600 mg, oral, q12h  lidocaine, 1 patch, transdermal, Daily  methocarbamol, 1,000 mg, intravenous, q8h      Continuous medications  dextrose 5%-0.45 % sodium chloride, 100 mL/hr, Last Rate: 100 mL/hr (11/14/23 3004)  ropivacaine (PF) in NS cmpd, 6 mL/hr, Last Rate: 12 mL/hr (11/10/23 1505)  ropivacaine (PF) in NS cmpd, 6 mL/hr      PRN medications  PRN medications: diphenhydrAMINE, magnesium hydroxide, naloxone, naloxone, ondansetron **OR** ondansetron, traMADol, traMADol      Assessment  Cheyanne is a 53 year old female with a past medical history of endometriosis, thyroid nodule, vitamin D deficiency, uterine polyp and 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 Barahona of breast surgery and Dr. Gorman of plastic surgery for bilateral cancer risk reduction mastectomies followed by immediate bilateral ALEKSANDR free flap reconstruction on 11/20/2023.      Plan/Recommendations  # S/p ALEKSANDR free flap reconstruction, complex closure, application wound vac   - Continue serial flap assessments Q4hour per nursing       ·  Assess clinical appearance of flap (color, warmth, turgor)       ·  Nursing to notify plastic surgery team immediately if there are any acute changes          (Including pallor, temperature change, bleeding, etc.)  - Do not wear surgical bra as it compresses ALEKSANDR free flaps  - Daily local wound care/dressing changes per plastics       ·  Xeroform strips to ALEKSANDR flap incision lines at breast changed daily and PRN if soiled          ·  Xeroform over umbilical incision covered by Tegaderm dressing to remain in place until vac removal       ·  Monitor surgical sites (including inferior incision lines) for s/s of bleeding, infection or dehiscence        ·  Discontinue nitropaste 11/14. Bruising to bilat breasts significantly improved.   - Continue ROBBY drain care per nursing (x1 to R breast, x1 to L breast)       ·  Empty and record output at least every 8 hours       ·  Strip drains TID and PRN to avoid drain obstruction        ·  Removal per plastic surgery team when output <30cc output/24hrs for x2 consecutive days       ·  Call plastics if drain output is >30cc in an hour or greater than 200cc over 8 hours  - Continue incisional wound vac therapy to Pfannenstiel incision at lower abdomen x14 days post op (end date ~11/24)        ·  Settings: 125 mmHg low continuous suction        ·  Please keep dressing C/D/I, reinforce PRN        ·  Please alert  plastics team with any concerns regarding vac        ·  Transition to prevena upon DC  - Maintain beach chair positioning at all times        ·  Head elevated and hips flexed at least 60 degrees, knees bent and elevated on pillows   - Avoid positioning that would apply pressure to flap region or incisions   - Patient to maintain strict bedrest postoperatively x 3 days (end date 11/13)   - PT eval 11/14  - Remove palomino 11/14. Pending TOV  - Continue to record and monitor strict I&Os  - S/p x3 doses IV ancef postoperatively   - BP goal of 110/60 minimum to ensure adequate flap perfusion   - SQ Heparin [id number], transition to Lovenox on POD #3 (PM 11/13)   - Daily ASA to start PM POD #1 then continue for x30 days postoperatively (end date 12/11)   - Encourage IS x10 every hour while awake   - Monitor VS/pulse ox [id number]   - Monitor AM CBC/RFP/Mg      # Acute postoperative pain  - Current regimen       ·  Tylenol 650mg PO [id number]        ·  Tramadol 50/100mg PO [id number] PRN moderate/severe pain       ·  Gabapentin 600mg BID        ·  Robaxin 1000mg IV [id number]        ·  Dilaudid PCA discontinued 11/14  - Acute pain following, appreciate recs        ·  Ropivacaine pain catheters x2 per anesthesia discontinued 11/13       ·  Amitriptyline 25mg nightly   - Bowel regimen: colace 100mg BID, Milk of Mg PRN for constipation while taking narcotics   - Benadryl 25mg IV PRN itching while taking narcotics   - Pain assessments every hour while on PCA      # Acute postoperative anemia   - Incoming/baseline HGB ~13 (as of recent labs 11/6/2023)  - AM Hbg 11/13: 7.7 with s/s of lightheadedness   -> transfused 1u prbc  - AM hgb 11/14: 9.1  - Monitor for S/S of bleeding or symptomatic anemia   - Monitor AM CBC       F: D5 1/2 NS@ 100ml/hour while waiting for adequate PO intake   E: Replete PRN   -> K+ repleted 11/12-14  N: Regular diet    GI: Continue bowel regimen with Colace BID, Milk of Mg PRN        ·  IV Zofran 4mg PRN for  nausea/vomiting      DVT ppx:  - ASA 81 to start PM POD#1 then continue once daily x30 days postoperatively (end date 12/11)  - SQ heparin TID x 3 days then transition to SQ Lovenox on POD#3 (PM 11/13)   - Use of SCDs while on bedrest       Disposition: Continue care on RNF. Will remain inpatient for continued flap monitoring and vac/drain surveillance postoperatively.     Patient and plan discussed with Dr. Mata, PA-C  Plastic and Reconstructive Surgery  Epic chat, Pager [phone], Team phones: [phone]

## 2023-11-14 NOTE — PROGRESS NOTES
"Cheyanne Rubio is a 53 y.o. female on day 4 of admission s/p bilateral prophylactic mastectomies and autologous reconstruction.     Subjective   Received 1 unit pRBC yesterday with appropriate incrementation. Up to chair today, feeling much better now that OOB.     Objective   Constitutional: Awake, alert, no acute distress  Neurological: Numbness in bilateral upper extremities improved.   Eyes: Non-icteric  Respiratory: No increased work of breathing on RA  Cardiac: Regular rate  Chest: Bilateral ALEKSANDR flaps soft, warm, improved ecchymosis, L >R. Bilateral ROBBY drains with serosanguinous drainage  Abdomen: Soft, nontender, nondistended. Prevena vac in place holding suction.   Genitourinary: Parker in place draining clear yellow urine  Skin: Warm, dry  Musculoskeletal: Moves all extremities  Extremities: No peripheral edema         Psychological: Appropriate mood/affect     Last Recorded Vitals  Blood pressure 102/69, pulse 68, temperature 35.7 °C (96.3 °F), temperature source Temporal, resp. rate 16, height 1.702 m (5' 7.01\"), weight 90.3 kg (199 lb 1.2 oz), SpO2 96 %.  Intake/Output last 3 Shifts:  I/O last 3 completed shifts:  In: 5950 (65.9 mL/kg) [I.V.:1150 (12.7 mL/kg); IV Piggyback:4800]  Out: 4800 (53.2 mL/kg) [Urine:4585 (1.4 mL/kg/hr); Drains:65; Blood:150]  Weight: 90.3 kg      Relevant Results        Results for orders placed or performed during the hospital encounter of 11/10/23 (from the past 24 hour(s))   Blood Gas Arterial Full Panel   Result Value Ref Range     POCT pH, Arterial 7.34 (L) 7.38 - 7.42 pH     POCT pCO2, Arterial 44 (H) 38 - 42 mm Hg     POCT pO2, Arterial 137 (H) 85 - 95 mm Hg     POCT SO2, Arterial 97 94 - 100 %     POCT Oxy Hemoglobin, Arterial 97.0 94.0 - 98.0 %     POCT Hematocrit Calculated, Arterial 39.0 36.0 - 46.0 %     POCT Sodium, Arterial 140 136 - 145 mmol/L     POCT Potassium, Arterial 4.1 3.5 - 5.3 mmol/L     POCT Chloride, Arterial 109 (H) 98 - 107 mmol/L     POCT Ionized " Calcium, Arterial 1.24 1.10 - 1.33 mmol/L     POCT Glucose, Arterial 186 (H) 74 - 99 mg/dL     POCT Lactate, Arterial 1.3 0.4 - 2.0 mmol/L     POCT Base Excess, Arterial -2.2 (L) -2.0 - 3.0 mmol/L     POCT HCO3 Calculated, Arterial 23.7 22.0 - 26.0 mmol/L     POCT Hemoglobin, Arterial 13.0 12.0 - 16.0 g/dL     POCT Anion Gap, Arterial 11 10 - 25 mmo/L     Patient Temperature 37.0 degrees Celsius     FiO2 50 %   CBC   Result Value Ref Range     WBC 14.0 (H) 4.4 - 11.3 x10*3/uL     nRBC 0.0 0.0 - 0.0 /100 WBCs     RBC 3.38 (L) 4.00 - 5.20 x10*6/uL     Hemoglobin 10.6 (L) 12.0 - 16.0 g/dL     Hematocrit 30.9 (L) 36.0 - 46.0 %     MCV 91 80 - 100 fL     MCH 31.4 26.0 - 34.0 pg     MCHC 34.3 32.0 - 36.0 g/dL     RDW 12.2 11.5 - 14.5 %     Platelets 175 150 - 450 x10*3/uL   Renal function panel   Result Value Ref Range     Glucose 132 (H) 74 - 99 mg/dL     Sodium 147 (H) 136 - 145 mmol/L     Potassium 3.7 3.5 - 5.3 mmol/L     Chloride 112 (H) 98 - 107 mmol/L     Bicarbonate 26 21 - 32 mmol/L     Anion Gap 13 10 - 20 mmol/L     Urea Nitrogen 9 6 - 23 mg/dL     Creatinine 0.71 0.50 - 1.05 mg/dL     eGFR >90 >60 mL/min/1.73m*2     Calcium 8.8 8.6 - 10.6 mg/dL     Phosphorus 3.5 2.5 - 4.9 mg/dL     Albumin 4.0 3.4 - 5.0 g/dL   Magnesium   Result Value Ref Range     Magnesium 1.73 1.60 - 2.40 mg/dL   CBC   Result Value Ref Range     WBC 12.6 (H) 4.4 - 11.3 x10*3/uL     nRBC 0.0 0.0 - 0.0 /100 WBCs     RBC 3.17 (L) 4.00 - 5.20 x10*6/uL     Hemoglobin 9.8 (L) 12.0 - 16.0 g/dL     Hematocrit 27.9 (L) 36.0 - 46.0 %     MCV 88 80 - 100 fL     MCH 30.9 26.0 - 34.0 pg     MCHC 35.1 32.0 - 36.0 g/dL     RDW 12.4 11.5 - 14.5 %     Platelets 192 150 - 450 x10*3/uL        Assessment/Plan   Principal Problem:    Monoallelic mutation of PALB2 gene  Active Problems:    Status post breast reconstruction    PONV (postoperative nausea and vomiting)    Recent PATTIEI     Cheyanne Rubio is a 53 year old F s/p bilateral risk reducing mastectomy with   Jun and bilateral ALEKSANDR reconstruction with Dr. Gorman for PALB2 mutation on 11/10. Patient is recovering well post operatively.      - Plastics primary  - Pain management, drains, wound care per Plastics     Discussed with attending surgeon, Dr. Barahona.      Nik Lyons MD  [id number]   General Surgery   Breast Surgery pager [phone]

## 2023-11-14 NOTE — CARE PLAN
Problem: Pain  Goal: Takes deep breaths with improved pain control throughout the shift  Outcome: Progressing  Goal: Turns in bed with improved pain control throughout the shift  Outcome: Progressing  Goal: Walks with improved pain control throughout the shift  Outcome: Progressing  Goal: Performs ADL's with improved pain control throughout shift  Outcome: Progressing  Goal: Participates in PT with improved pain control throughout the shift  Outcome: Progressing  Goal: Free from opioid side effects throughout the shift  Outcome: Progressing  Goal: Free from acute confusion related to pain meds throughout the shift  Outcome: Progressing   The patient's goals for the shift include  remain free from fall    The clinical goals for the shift include Pt will call for pain medicine when needed

## 2023-11-15 ENCOUNTER — TELEPHONE (OUTPATIENT)
Dept: SURGICAL ONCOLOGY | Facility: CLINIC | Age: 53
End: 2023-11-15
Payer: COMMERCIAL

## 2023-11-15 LAB
ALBUMIN SERPL BCP-MCNC: 3.2 G/DL (ref 3.4–5)
ANION GAP SERPL CALC-SCNC: 11 MMOL/L (ref 10–20)
BUN SERPL-MCNC: 5 MG/DL (ref 6–23)
CALCIUM SERPL-MCNC: 8.6 MG/DL (ref 8.6–10.6)
CHLORIDE SERPL-SCNC: 110 MMOL/L (ref 98–107)
CO2 SERPL-SCNC: 28 MMOL/L (ref 21–32)
CREAT SERPL-MCNC: 0.54 MG/DL (ref 0.5–1.05)
ERYTHROCYTE [DISTWIDTH] IN BLOOD BY AUTOMATED COUNT: 13 % (ref 11.5–14.5)
GFR SERPL CREATININE-BSD FRML MDRD: >90 ML/MIN/1.73M*2
GLUCOSE SERPL-MCNC: 91 MG/DL (ref 74–99)
HCT VFR BLD AUTO: 28.1 % (ref 36–46)
HGB BLD-MCNC: 9.1 G/DL (ref 12–16)
MCH RBC QN AUTO: 29.9 PG (ref 26–34)
MCHC RBC AUTO-ENTMCNC: 32.4 G/DL (ref 32–36)
MCV RBC AUTO: 92 FL (ref 80–100)
NRBC BLD-RTO: 0 /100 WBCS (ref 0–0)
PHOSPHATE SERPL-MCNC: 1.9 MG/DL (ref 2.5–4.9)
PLATELET # BLD AUTO: 200 X10*3/UL (ref 150–450)
POTASSIUM SERPL-SCNC: 3.8 MMOL/L (ref 3.5–5.3)
RBC # BLD AUTO: 3.04 X10*6/UL (ref 4–5.2)
SODIUM SERPL-SCNC: 145 MMOL/L (ref 136–145)
WBC # BLD AUTO: 6.3 X10*3/UL (ref 4.4–11.3)

## 2023-11-15 PROCEDURE — 2500000001 HC RX 250 WO HCPCS SELF ADMINISTERED DRUGS (ALT 637 FOR MEDICARE OP): Performed by: ANESTHESIOLOGY

## 2023-11-15 PROCEDURE — 84100 ASSAY OF PHOSPHORUS: CPT

## 2023-11-15 PROCEDURE — 96372 THER/PROPH/DIAG INJ SC/IM: CPT

## 2023-11-15 PROCEDURE — 2500000001 HC RX 250 WO HCPCS SELF ADMINISTERED DRUGS (ALT 637 FOR MEDICARE OP): Performed by: NURSE PRACTITIONER

## 2023-11-15 PROCEDURE — 2500000004 HC RX 250 GENERAL PHARMACY W/ HCPCS (ALT 636 FOR OP/ED)

## 2023-11-15 PROCEDURE — 1170000001 HC PRIVATE ONCOLOGY ROOM DAILY

## 2023-11-15 PROCEDURE — 36415 COLL VENOUS BLD VENIPUNCTURE: CPT

## 2023-11-15 PROCEDURE — 85027 COMPLETE CBC AUTOMATED: CPT

## 2023-11-15 PROCEDURE — 2500000004 HC RX 250 GENERAL PHARMACY W/ HCPCS (ALT 636 FOR OP/ED): Performed by: NURSE PRACTITIONER

## 2023-11-15 PROCEDURE — 99231 SBSQ HOSP IP/OBS SF/LOW 25: CPT

## 2023-11-15 RX ORDER — ADHESIVE BANDAGE
30 BANDAGE TOPICAL DAILY PRN
Status: DISCONTINUED | OUTPATIENT
Start: 2023-11-15 | End: 2023-11-16 | Stop reason: HOSPADM

## 2023-11-15 RX ORDER — POLYETHYLENE GLYCOL 3350 17 G/17G
17 POWDER, FOR SOLUTION ORAL DAILY
Status: DISCONTINUED | OUTPATIENT
Start: 2023-11-15 | End: 2023-11-16 | Stop reason: HOSPADM

## 2023-11-15 RX ADMIN — AMITRIPTYLINE HYDROCHLORIDE 25 MG: 25 TABLET, FILM COATED ORAL at 21:45

## 2023-11-15 RX ADMIN — METHOCARBAMOL 1000 MG: 100 INJECTION INTRAMUSCULAR; INTRAVENOUS at 21:46

## 2023-11-15 RX ADMIN — ACETAMINOPHEN 650 MG: 325 TABLET ORAL at 18:32

## 2023-11-15 RX ADMIN — ASPIRIN 81 MG: 81 TABLET, COATED ORAL at 08:47

## 2023-11-15 RX ADMIN — ENOXAPARIN SODIUM 40 MG: 100 INJECTION SUBCUTANEOUS at 08:47

## 2023-11-15 RX ADMIN — GABAPENTIN 600 MG: 300 CAPSULE ORAL at 05:48

## 2023-11-15 RX ADMIN — DOCUSATE SODIUM 100 MG: 100 CAPSULE, LIQUID FILLED ORAL at 08:47

## 2023-11-15 RX ADMIN — DOCUSATE SODIUM 100 MG: 100 CAPSULE, LIQUID FILLED ORAL at 21:43

## 2023-11-15 RX ADMIN — METHOCARBAMOL 1000 MG: 100 INJECTION INTRAMUSCULAR; INTRAVENOUS at 05:48

## 2023-11-15 RX ADMIN — MAGNESIUM HYDROXIDE 30 ML: 400 SUSPENSION ORAL at 17:48

## 2023-11-15 RX ADMIN — TRAMADOL HYDROCHLORIDE 100 MG: 50 TABLET, COATED ORAL at 17:39

## 2023-11-15 RX ADMIN — GABAPENTIN 600 MG: 300 CAPSULE ORAL at 18:32

## 2023-11-15 RX ADMIN — POLYETHYLENE GLYCOL 3350 17 G: 17 POWDER, FOR SOLUTION ORAL at 11:45

## 2023-11-15 RX ADMIN — ACETAMINOPHEN 650 MG: 325 TABLET ORAL at 05:48

## 2023-11-15 RX ADMIN — METHOCARBAMOL 1000 MG: 100 INJECTION INTRAMUSCULAR; INTRAVENOUS at 13:00

## 2023-11-15 RX ADMIN — TRAMADOL HYDROCHLORIDE 100 MG: 50 TABLET, COATED ORAL at 21:47

## 2023-11-15 RX ADMIN — ACETAMINOPHEN 650 MG: 325 TABLET ORAL at 11:47

## 2023-11-15 RX ADMIN — TRAMADOL HYDROCHLORIDE 50 MG: 50 TABLET, COATED ORAL at 04:27

## 2023-11-15 ASSESSMENT — PAIN - FUNCTIONAL ASSESSMENT
PAIN_FUNCTIONAL_ASSESSMENT: 0-10

## 2023-11-15 ASSESSMENT — PAIN SCALES - PAIN ASSESSMENT IN ADVANCED DEMENTIA (PAINAD)
FACIALEXPRESSION: SMILING OR INEXPRESSIVE
TOTALSCORE: 0
CONSOLABILITY: NO NEED TO CONSOLE
CONSOLABILITY: NO NEED TO CONSOLE
BODYLANGUAGE: TENSE, DISTRESSED PACING, FIDGETING
BODYLANGUAGE: RELAXED
BREATHING: NORMAL
BREATHING: NORMAL

## 2023-11-15 ASSESSMENT — PAIN SCALES - WONG BAKER
WONGBAKER_NUMERICALRESPONSE: HURTS LITTLE MORE
WONGBAKER_NUMERICALRESPONSE: HURTS LITTLE BIT

## 2023-11-15 ASSESSMENT — PAIN SCALES - GENERAL
PAINLEVEL_OUTOF10: 8
PAINLEVEL_OUTOF10: 7
PAINLEVEL_OUTOF10: 6
PAINLEVEL_OUTOF10: 2
PAINLEVEL_OUTOF10: 8
PAINLEVEL_OUTOF10: 6

## 2023-11-15 ASSESSMENT — PAIN DESCRIPTION - LOCATION
LOCATION: BREAST
LOCATION: BREAST

## 2023-11-15 NOTE — PROGRESS NOTES
Surgical Oncology Daily Progress Note      Subjective  HECTOR ON. Reports weakness and discomfort in R. Hand after having an infiltrated IV. She also reports numbness in her L. Thumb since surgery.     Current Meds  Scheduled medications  acetaminophen, 650 mg, oral, q6h ADY  amitriptyline, 25 mg, oral, Nightly  aspirin, 81 mg, oral, Daily  docusate sodium, 100 mg, oral, BID  enoxaparin, 40 mg, subcutaneous, q24h ADY  gabapentin, 600 mg, oral, q12h  methocarbamol, 1,000 mg, intravenous, q8h  polyethylene glycol, 17 g, oral, Daily      Continuous medications  ropivacaine (PF) in NS cmpd, 6 mL/hr, Last Rate: 12 mL/hr (11/10/23 1505)  ropivacaine (PF) in NS cmpd, 6 mL/hr      PRN medications  PRN medications: diphenhydrAMINE, magnesium hydroxide, naloxone, naloxone, ondansetron **OR** ondansetron, traMADol, traMADol    Objective    Vitals:   Temp:  [35.9 °C (96.6 °F)-36.6 °C (97.9 °F)] 36.2 °C (97.2 °F)  Heart Rate:  [65-80] 80  Resp:  [16-18] 16  BP: ()/(57-85) 117/80      I/O  I/O last 3 completed shifts:  In: 3893.3 (43.1 mL/kg) [P.O.:660; I.V.:2883.3 (31.9 mL/kg); Blood:350]  Out: 6670 (73.9 mL/kg) [Urine:6400 (2 mL/kg/hr); Drains:270]  Weight: 90.3 kg       Physical Exam  General: laying in bed, NAD  Head: normocephalic, atraumatic  ENT: mucosa are moist   Respiratory: nonlabored breathing on room air. B/l breast incisions well approximated without surrounding erythema, edema, or drainage. 2cm hemorrhagic blister on left breast, 2 cm lateral to mid axillary line and 2cm beneath incisions. Bruising near left inframamillary fold.   CV: RRR  GI: abdomen is soft, nontender, nondistended  MSK: MUNIR  Extremities: no swelling or deformity of b/l LEs   Neuro: CN II-XII grossly intact. 4/5 strength with R. , arm flexion, and arm extension. Sensation intact across RLE. 5/5 strength with L. , arm flexion, and arm extension. Sensation to light touch reduced in left thumb.     Labs:  Lab Results   Component Value  Date    WBC 6.3 11/15/2023    HGB 9.1 (L) 11/15/2023    HCT 28.1 (L) 11/15/2023    MCV 92 11/15/2023     11/15/2023     Lab Results   Component Value Date    GLUCOSE 91 11/15/2023    CALCIUM 8.6 11/15/2023     11/15/2023    K 3.8 11/15/2023    CO2 28 11/15/2023     (H) 11/15/2023    BUN 5 (L) 11/15/2023    CREATININE 0.54 11/15/2023     Lab Results   Component Value Date    ALT 9 04/16/2019    AST 11 04/16/2019    ALKPHOS 57 04/16/2019    BILITOT 0.4 04/16/2019         Results from last 7 days   Lab Units 11/10/23  1629   POCT PH, ARTERIAL pH 7.34*   POCT PCO2, ARTERIAL mm Hg 44*   POCT PO2, ARTERIAL mm Hg 137*   POCT HCO3 CALCULATED, ARTERIAL mmol/L 23.7   POCT BASE EXCESS, ARTERIAL mmol/L -2.2*       Assessment:  Ms. Rubio, 53yoF s/p b/l risk reducing mastectomy with Dr. Barahona and b/l ALEKSANDR reconstruction by Dr. Gorman on 11/10 for PALB2 mutation. New motor.sensory changes may be related to intra-operative positioning as well as pain from infiltrated IV. Will continue to monitor. She is otherwise recovering well postoperatively.     Recommendations:   - will continue to monitor   - remainder of care per primary team     Discussed with Attending Physician    Donita Avalos MD  General Surgery PGY 5  Surgical Oncology [id number]

## 2023-11-15 NOTE — PROGRESS NOTES
Plastic Surgery Progress Note    Patient Name: Cheyanne Rubio  MRN: [id number]  Date:  11/15/23     Subjective  Found resting in bed comfortably sitting up in chair. Pain well controlled on current pain regimen. Notes she has not been eating much since surgery. Last BM Thurs 11/9. +flatus. States she will attempt to drink green juice/coffee/prune juice today in addition to miralax. No difficulty urinating. Continue to note tenderness to ROBBY exit sites, R>L. R hand swelling improving on exam today and in turn pain improving as well. Denies any fever, chills, night sweats, CP, SOB, palpitations, nausea, vomiting.    Overnight Events  NAOE    Objective    Physical Exam   Constitutional: NAD.   Eyes: Clear sclera. EOMI.   ENMT: Moist mucous membranes, no apparent injuries or lesions.  Head/Neck: NCAT.  Cardiovascular: RRR.  Respiratory/Thorax: Unlabored respirations on RA. Symmetric chest rise.   Gastrointestinal: Abdomen soft, non-distended. Prevena vac in place, holding suction at -125 mmHg, no leaks or obstruction, umbilical region covered with xeroform and Aquacel Ag. Minimally tender to palpation along wound vac.   Genitourinary: Voiding independently   Neurological: A&Ox3.   Skin: Warm and dry with no lesions or rashes.   Breast: Bilateral ALEKSANDR free flap recipient site soft, warm to touch, with some residual bruising beneath bilat breasts. X1 blister to R breast between incision and bruising. Linear skin tear to R IMF fold medially. Flap approximated with intact sutures and incision lines dressed with Xeroform dressing, no evidence of dehiscence. Intact doppler pulses at the upper, medial corners near the sternum bilaterally. x1 ROBBY drain to each breast with ssg output.   Extremities: Right hand swelling improved. Audible radial pulse on doppler. RUE warm, soft, compressible. R hand ttp upon flexion. Left hand with decreased sensation in digits 1-2 and thenar aspect. Sensation improved to digit 2 on exam  today.  Diagnostics   Results for orders placed or performed during the hospital encounter of 11/10/23 (from the past 24 hour(s))   CBC   Result Value Ref Range    WBC 6.3 4.4 - 11.3 x10*3/uL    nRBC 0.0 0.0 - 0.0 /100 WBCs    RBC 3.04 (L) 4.00 - 5.20 x10*6/uL    Hemoglobin 9.1 (L) 12.0 - 16.0 g/dL    Hematocrit 28.1 (L) 36.0 - 46.0 %    MCV 92 80 - 100 fL    MCH 29.9 26.0 - 34.0 pg    MCHC 32.4 32.0 - 36.0 g/dL    RDW 13.0 11.5 - 14.5 %    Platelets 200 150 - 450 x10*3/uL   Renal function panel   Result Value Ref Range    Glucose 91 74 - 99 mg/dL    Sodium 145 136 - 145 mmol/L    Potassium 3.8 3.5 - 5.3 mmol/L    Chloride 110 (H) 98 - 107 mmol/L    Bicarbonate 28 21 - 32 mmol/L    Anion Gap 11 10 - 20 mmol/L    Urea Nitrogen 5 (L) 6 - 23 mg/dL    Creatinine 0.54 0.50 - 1.05 mg/dL    eGFR >90 >60 mL/min/1.73m*2    Calcium 8.6 8.6 - 10.6 mg/dL    Phosphorus 1.9 (L) 2.5 - 4.9 mg/dL    Albumin 3.2 (L) 3.4 - 5.0 g/dL     No results found.    Current Medications  Scheduled medications  acetaminophen, 650 mg, oral, q6h ADY  amitriptyline, 25 mg, oral, Nightly  aspirin, 81 mg, oral, Daily  docusate sodium, 100 mg, oral, BID  enoxaparin, 40 mg, subcutaneous, q24h ADY  gabapentin, 600 mg, oral, q12h  lidocaine, 1 patch, transdermal, Daily  methocarbamol, 1,000 mg, intravenous, q8h      Continuous medications  ropivacaine (PF) in NS cmpd, 6 mL/hr, Last Rate: 12 mL/hr (11/10/23 1505)  ropivacaine (PF) in NS cmpd, 6 mL/hr      PRN medications  PRN medications: diphenhydrAMINE, magnesium hydroxide, naloxone, naloxone, ondansetron **OR** ondansetron, traMADol, traMADol     Assessment  Cheyanne is a 53 year old female with a past medical history of endometriosis, thyroid nodule, vitamin D deficiency, uterine polyp and 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 Barahona of breast surgery and Dr. Gorman of plastic surgery for bilateral cancer risk reduction mastectomies followed by immediate bilateral  ALEKSANDR free flap reconstruction on 11/20/2023.      Plan/Recommendations  # S/p ALEKSANDR free flap reconstruction, complex closure, application wound vac   - Continue serial flap assessments Q4hour per nursing       ·  Assess clinical appearance of flap (color, warmth, turgor)       ·  Nursing to notify plastic surgery team immediately if there are any acute changes          (Including pallor, temperature change, bleeding, etc.)  - Do not wear surgical bra as it compresses ALEKSANDR free flaps  - Bilateral breast incisions BREN   - Discontinue nitropaste 11/14  - Continue ROBBY drain care per nursing (x1 to R breast, x1 to L breast)       ·  Empty and record output at least every 8 hours       ·  Strip drains TID and PRN to avoid drain obstruction        ·  Removal per plastic surgery team when output <30cc output/24hrs for x2 consecutive days       ·  Call plastics if drain output is >30cc in an hour or greater than 200cc over 8 hours  - Continue incisional wound vac therapy to Pfannenstiel incision at lower abdomen x14 days post op (end date ~11/24)        ·  Settings: 125 mmHg low continuous suction        ·  Please keep dressing C/D/I, reinforce PRN        ·  Please alert plastics team with any concerns regarding vac        ·  Transition to prevena upon DC  - Maintain beach chair positioning        ·  Head elevated and hips flexed at least 60 degrees, knees bent and elevated on pillows   - Avoid positioning that would apply pressure to flap region or incisions   - PT eval 11/14  - Parker removed 11/14 with appropriate UOP  - Continue to record and monitor strict I&Os  - S/p x3 doses IV ancef postoperatively   - BP goal of 110/60 minimum to ensure adequate flap perfusion   - SQ Heparin T0veczo, transition to Lovenox on POD #3 (PM 11/13)   - Daily ASA to start PM POD #1 then continue for x30 days postoperatively (end date 12/11)   - Encourage IS x10 every hour while awake   - Monitor VS/pulse ox [id number]   - Monitor AM CBC/RFP/Mg      # Right hand pain/swelling; Left hand numbness  - 11/14: Complaining of right hand pain, swelling, and decreased  strength   - Likely 2/2 to repeated attempts at IV insertion and OR positioning   - 11/14: Attempted lidocaine patch to dorsal aspect of R hand. Developed erythema extending up the arm. Discontinued in setting of skin irritation and no improvement of pain.   - On exam 11/15, swelling/pain/and numbness all improving  - Continue to monitor, consider RUE US if worsening     # Acute postoperative pain  - Current regimen       ·  Tylenol 650mg PO Q6dnnpt        ·  Tramadol 50/100mg PO O1ieshe PRN moderate/severe pain       ·  Gabapentin 600mg BID        ·  Robaxin 1000mg IV [id number]        ·  Dilaudid PCA discontinued 11/14  - Acute pain following, appreciate recs        ·  Ropivacaine pain catheters x2 per anesthesia discontinued 11/13       ·  Amitriptyline 25mg nightly   - Bowel regimen: colace 100mg BID  - Benadryl 25mg IV PRN itching while taking narcotics      # Acute postoperative anemia   - Incoming/baseline HGB ~13 (as of recent labs 11/6/2023)  - AM Hbg 11/13: 7.7 with s/s of lightheadedness   -> transfused 1u prbc  - AM hgb 11/15: 9.1  - Monitor for S/S of bleeding or symptomatic anemia   - Monitor AM CBC       F: Encourage PO intake   E: Replete PRN   -> K+ repleted 11/12-14  N: Regular diet    GI: Continue bowel regimen with Colace BID, Miralax       ·  IV Zofran 4mg PRN for nausea/vomiting      DVT ppx:  - ASA 81 to start PM POD#1 then continue once daily x30 days postoperatively (end date 12/11)  - SQ heparin TID x 3 days then transition to SQ Lovenox on POD#3 (PM 11/13)   - Use of SCDs while on bedrest       Disposition: Continue care on RNF. Will remain inpatient for continued flap monitoring and vac/drain surveillance postoperatively. Home PT, skilled nursing, and wheeled walker ordered. Anticipate DC Thurs 11/16. Follow up visit scheduled for 11/20.      Patient and plan discussed  with Dr. Adolfo PA-C  Plastic and Reconstructive Surgery  Epic chat, Pager [phone], Team phones: [phone], [phone]

## 2023-11-16 ENCOUNTER — APPOINTMENT (OUTPATIENT)
Dept: VASCULAR MEDICINE | Facility: HOSPITAL | Age: 53
DRG: 584 | End: 2023-11-16
Payer: COMMERCIAL

## 2023-11-16 ENCOUNTER — HOME HEALTH ADMISSION (OUTPATIENT)
Dept: HOME HEALTH SERVICES | Facility: HOME HEALTH | Age: 53
End: 2023-11-16
Payer: COMMERCIAL

## 2023-11-16 VITALS
TEMPERATURE: 97 F | BODY MASS INDEX: 31.25 KG/M2 | SYSTOLIC BLOOD PRESSURE: 117 MMHG | RESPIRATION RATE: 18 BRPM | OXYGEN SATURATION: 97 % | HEART RATE: 72 BPM | HEIGHT: 67 IN | DIASTOLIC BLOOD PRESSURE: 83 MMHG | WEIGHT: 199.08 LBS

## 2023-11-16 PROBLEM — R22.31 LOCALIZED SWELLING OF RIGHT UPPER EXTREMITY: Status: ACTIVE | Noted: 2023-11-16

## 2023-11-16 LAB
ALBUMIN SERPL BCP-MCNC: 3.2 G/DL (ref 3.4–5)
ANION GAP SERPL CALC-SCNC: 13 MMOL/L (ref 10–20)
BUN SERPL-MCNC: 6 MG/DL (ref 6–23)
CALCIUM SERPL-MCNC: 8.6 MG/DL (ref 8.6–10.6)
CHLORIDE SERPL-SCNC: 103 MMOL/L (ref 98–107)
CO2 SERPL-SCNC: 27 MMOL/L (ref 21–32)
CREAT SERPL-MCNC: 0.58 MG/DL (ref 0.5–1.05)
ERYTHROCYTE [DISTWIDTH] IN BLOOD BY AUTOMATED COUNT: 13.2 % (ref 11.5–14.5)
GFR SERPL CREATININE-BSD FRML MDRD: >90 ML/MIN/1.73M*2
GLUCOSE SERPL-MCNC: 83 MG/DL (ref 74–99)
HCT VFR BLD AUTO: 27.7 % (ref 36–46)
HGB BLD-MCNC: 8.9 G/DL (ref 12–16)
MCH RBC QN AUTO: 30.3 PG (ref 26–34)
MCHC RBC AUTO-ENTMCNC: 32.1 G/DL (ref 32–36)
MCV RBC AUTO: 94 FL (ref 80–100)
NRBC BLD-RTO: 0 /100 WBCS (ref 0–0)
PHOSPHATE SERPL-MCNC: 3.3 MG/DL (ref 2.5–4.9)
PLATELET # BLD AUTO: 217 X10*3/UL (ref 150–450)
POTASSIUM SERPL-SCNC: 3.5 MMOL/L (ref 3.5–5.3)
RBC # BLD AUTO: 2.94 X10*6/UL (ref 4–5.2)
SODIUM SERPL-SCNC: 139 MMOL/L (ref 136–145)
WBC # BLD AUTO: 5.1 X10*3/UL (ref 4.4–11.3)

## 2023-11-16 PROCEDURE — 85027 COMPLETE CBC AUTOMATED: CPT

## 2023-11-16 PROCEDURE — 96372 THER/PROPH/DIAG INJ SC/IM: CPT

## 2023-11-16 PROCEDURE — 99024 POSTOP FOLLOW-UP VISIT: CPT

## 2023-11-16 PROCEDURE — 2500000001 HC RX 250 WO HCPCS SELF ADMINISTERED DRUGS (ALT 637 FOR MEDICARE OP): Performed by: NURSE PRACTITIONER

## 2023-11-16 PROCEDURE — 2500000005 HC RX 250 GENERAL PHARMACY W/O HCPCS: Performed by: NURSE PRACTITIONER

## 2023-11-16 PROCEDURE — 97116 GAIT TRAINING THERAPY: CPT | Mod: GP

## 2023-11-16 PROCEDURE — 93971 EXTREMITY STUDY: CPT

## 2023-11-16 PROCEDURE — 80069 RENAL FUNCTION PANEL: CPT

## 2023-11-16 PROCEDURE — 2500000004 HC RX 250 GENERAL PHARMACY W/ HCPCS (ALT 636 FOR OP/ED)

## 2023-11-16 PROCEDURE — 2500000004 HC RX 250 GENERAL PHARMACY W/ HCPCS (ALT 636 FOR OP/ED): Performed by: NURSE PRACTITIONER

## 2023-11-16 PROCEDURE — 99231 SBSQ HOSP IP/OBS SF/LOW 25: CPT

## 2023-11-16 PROCEDURE — 2500000001 HC RX 250 WO HCPCS SELF ADMINISTERED DRUGS (ALT 637 FOR MEDICARE OP)

## 2023-11-16 PROCEDURE — 93971 EXTREMITY STUDY: CPT | Performed by: INTERNAL MEDICINE

## 2023-11-16 PROCEDURE — 36415 COLL VENOUS BLD VENIPUNCTURE: CPT

## 2023-11-16 RX ORDER — METHOCARBAMOL 500 MG/1
500 TABLET, FILM COATED ORAL 4 TIMES DAILY
Qty: 12 TABLET | Refills: 0 | Status: SHIPPED | OUTPATIENT
Start: 2023-11-16 | End: 2023-11-21 | Stop reason: ALTCHOICE

## 2023-11-16 RX ORDER — METHOCARBAMOL 500 MG/1
1000 TABLET, FILM COATED ORAL EVERY 8 HOURS SCHEDULED
Status: DISCONTINUED | OUTPATIENT
Start: 2023-11-16 | End: 2023-11-16 | Stop reason: HOSPADM

## 2023-11-16 RX ORDER — ASPIRIN 81 MG/1
81 TABLET ORAL DAILY
Qty: 25 TABLET | Refills: 0 | Status: SHIPPED | OUTPATIENT
Start: 2023-11-16 | End: 2023-12-11

## 2023-11-16 RX ORDER — IBUPROFEN 600 MG/1
600 TABLET ORAL EVERY 6 HOURS
Qty: 20 TABLET | Refills: 0 | Status: SHIPPED | OUTPATIENT
Start: 2023-11-16 | End: 2023-11-21

## 2023-11-16 RX ORDER — ONDANSETRON 4 MG/1
4 TABLET, FILM COATED ORAL EVERY 8 HOURS PRN
Qty: 9 TABLET | Refills: 0 | Status: CANCELLED | OUTPATIENT
Start: 2023-11-16 | End: 2023-11-19

## 2023-11-16 RX ORDER — DOCUSATE SODIUM 100 MG/1
100 CAPSULE, LIQUID FILLED ORAL 2 TIMES DAILY
Qty: 10 CAPSULE | Refills: 0 | Status: SHIPPED | OUTPATIENT
Start: 2023-11-16 | End: 2023-11-21

## 2023-11-16 RX ORDER — ONDANSETRON 4 MG/1
4 TABLET, FILM COATED ORAL EVERY 8 HOURS PRN
Qty: 15 TABLET | Refills: 0 | Status: SHIPPED | OUTPATIENT
Start: 2023-11-16 | End: 2023-11-20 | Stop reason: SDUPTHER

## 2023-11-16 RX ORDER — TRAMADOL HYDROCHLORIDE 50 MG/1
50 TABLET ORAL EVERY 6 HOURS PRN
Qty: 20 TABLET | Refills: 0 | Status: SHIPPED | OUTPATIENT
Start: 2023-11-16 | End: 2023-11-21

## 2023-11-16 RX ORDER — ACETAMINOPHEN 325 MG/1
650 TABLET ORAL EVERY 6 HOURS SCHEDULED
Qty: 56 TABLET | Refills: 0 | Status: SHIPPED | OUTPATIENT
Start: 2023-11-16 | End: 2023-11-23

## 2023-11-16 RX ORDER — GABAPENTIN 300 MG/1
600 CAPSULE ORAL EVERY 12 HOURS
Qty: 56 CAPSULE | Refills: 0 | Status: SHIPPED | OUTPATIENT
Start: 2023-11-16 | End: 2023-11-20 | Stop reason: SDUPTHER

## 2023-11-16 RX ADMIN — ASPIRIN 81 MG: 81 TABLET, COATED ORAL at 09:19

## 2023-11-16 RX ADMIN — ENOXAPARIN SODIUM 40 MG: 100 INJECTION SUBCUTANEOUS at 09:18

## 2023-11-16 RX ADMIN — ONDANSETRON HYDROCHLORIDE 4 MG: 4 TABLET, FILM COATED ORAL at 13:54

## 2023-11-16 RX ADMIN — TRAMADOL HYDROCHLORIDE 50 MG: 50 TABLET, COATED ORAL at 10:24

## 2023-11-16 RX ADMIN — DOCUSATE SODIUM 100 MG: 100 CAPSULE, LIQUID FILLED ORAL at 09:18

## 2023-11-16 RX ADMIN — ACETAMINOPHEN 650 MG: 325 TABLET ORAL at 11:35

## 2023-11-16 RX ADMIN — TRAMADOL HYDROCHLORIDE 50 MG: 50 TABLET, COATED ORAL at 02:18

## 2023-11-16 RX ADMIN — TRAMADOL HYDROCHLORIDE 100 MG: 50 TABLET, COATED ORAL at 14:26

## 2023-11-16 RX ADMIN — ACETAMINOPHEN 650 MG: 325 TABLET ORAL at 00:32

## 2023-11-16 RX ADMIN — METHOCARBAMOL 1000 MG: 100 INJECTION INTRAMUSCULAR; INTRAVENOUS at 05:58

## 2023-11-16 RX ADMIN — GABAPENTIN 600 MG: 300 CAPSULE ORAL at 05:57

## 2023-11-16 RX ADMIN — TRAMADOL HYDROCHLORIDE 100 MG: 50 TABLET, COATED ORAL at 05:56

## 2023-11-16 RX ADMIN — METHOCARBAMOL 1000 MG: 500 TABLET ORAL at 13:35

## 2023-11-16 RX ADMIN — ACETAMINOPHEN 650 MG: 325 TABLET ORAL at 06:04

## 2023-11-16 ASSESSMENT — COGNITIVE AND FUNCTIONAL STATUS - GENERAL
MOBILITY SCORE: 20
TOILETING: A LITTLE
DAILY ACTIVITIY SCORE: 16
DRESSING REGULAR UPPER BODY CLOTHING: A LOT
DRESSING REGULAR LOWER BODY CLOTHING: A LOT
STANDING UP FROM CHAIR USING ARMS: A LITTLE
HELP NEEDED FOR BATHING: A LOT
PERSONAL GROOMING: A LITTLE
CLIMB 3 TO 5 STEPS WITH RAILING: A LITTLE
WALKING IN HOSPITAL ROOM: A LITTLE
MOVING TO AND FROM BED TO CHAIR: A LITTLE

## 2023-11-16 ASSESSMENT — PAIN - FUNCTIONAL ASSESSMENT
PAIN_FUNCTIONAL_ASSESSMENT: 0-10

## 2023-11-16 ASSESSMENT — PAIN DESCRIPTION - DESCRIPTORS: DESCRIPTORS: DISCOMFORT;ACHING

## 2023-11-16 ASSESSMENT — PAIN DESCRIPTION - LOCATION
LOCATION: ABDOMEN

## 2023-11-16 ASSESSMENT — PAIN SCALES - GENERAL
PAINLEVEL_OUTOF10: 8
PAINLEVEL_OUTOF10: 8
PAINLEVEL_OUTOF10: 3
PAINLEVEL_OUTOF10: 8
PAINLEVEL_OUTOF10: 2
PAINLEVEL_OUTOF10: 8
PAINLEVEL_OUTOF10: 4

## 2023-11-16 ASSESSMENT — PAIN DESCRIPTION - ORIENTATION
ORIENTATION: LOWER;UPPER

## 2023-11-16 NOTE — PROGRESS NOTES
Physical Therapy                 Therapy Communication Note    Patient Name: Cheyanne Rubio  MRN: [id number]  Today's Date: 11/16/2023     Discipline: Physical Therapy    Missed Visit Reason: Missed Visit Reason: Other (Comment) (Attempt at 0935. Pt. pending R UE ultrasound to rule out DVT. Will reattempt as medically appropriate.)    Missed Time: Attempt    Comment:

## 2023-11-16 NOTE — NURSING NOTE
Discharge Note:  Pt discharged home via transport to personal car with  at approximately 1530. Pt A/O x 4 displaying no signs of obvious distress. Vital signs WNL. Wound Vac discontinued and placed in [facility] with message left for Wound Vac.

## 2023-11-16 NOTE — DISCHARGE SUMMARY
Discharge Diagnosis  Monoallelic mutation of PALB2 gene    Test Results Pending At Discharge  Pending Labs       Order Current Status    Surgical Pathology Exam In process          Hospital Course  BRIEF HISTORY:    Cheyanne Rubio is a 53 y.o. 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 Barahona of breast surgery and Dr. Gorman of plastic surgery for bilateral cancer risk reduction mastectomies followed by immediate bilateral ALEKSANDR free flap reconstruction on 11/20/2023.     HOSPITAL COURSE:    Pt admitted to the plastic surgery service post operatively for close monitoring of free flap reconstruction. Acute pain consulted for assistance with pain postoperatively and to management nerve blocks placed intraoperatively 11/10 and elected to place patient on a PCA pump immediately post operatively. POD3 transfused 1u prbc for hgb 7.7 and s/s lightheadedness. Pump weaned POD4. Parker removed POD4. Voiding without issue. PT eval POD4 rec safe DC to home with PT. POD4 noted some R hand swelling and pain likely 2/2 to repeated IV insertion that has been progressing and healing well. RUE US ordered DOD and revealed superficial thrombophlebitis. LUE US reveals normal flow. Bruising noted to bilat breasts immediately post-op that healed well with nitropaste application.     DAY OF DISCHARGE:    On the day of discharge, the patient was seen and evaluated by the Plastic Surgery team and deemed suitable for discharge home.  There were no significant events overnight. Vitals were reviewed and within normal limits. Labs were stable at discharge. On day of discharge the patient was tolerating a diet, pain was controlled on PO pain medication, was ambulating well and voiding spontaneously. The patient was given detailed discharge instructions and were scheduled to follow up as an outpatient.       Pertinent Physical Exam At Time of Discharge  See daily progress note     Home Medications     Medication List      ASK your doctor about these medications     Auro DRI Swimmers' Ear 95-5 % otic solution; Generic drug: isopropyl   alcohoL in glycerin   azelastine 137 mcg (0.1 %) nasal spray; Commonly known as: Astelin   b complex 0.4 mg tablet   brimonidine 0.025 % drops   calcium citrate-vitamin D3 500 mg-12.5 mcg (500 unit) tablet,chewable   fexofenadine 180 mg tablet; Commonly known as: Allegra   fluticasone 50 mcg/actuation nasal spray; Commonly known as: Flonase   ibuprofen 400 mg tablet   ProAir HFA 90 mcg/actuation inhaler; Generic drug: albuterol   psyllium 0.4 gram capsule; Commonly known as: Metamucil       Outpatient Follow-Up  Future Appointments   Date Time Provider Department Center   11/20/2023  8:00 AM Crystal Gorman MD [id number]       Regina Javed PA-C

## 2023-11-16 NOTE — PROGRESS NOTES
Cheyanne Rubio is a 53 y.o. female on post operative day 6 S/P bilateral skin sparing mastectomy and reconstruction with free MS-TRAM and TRAM.    Subjective   Resting comfortably on her bed-side recliner. She denies fever and chills. She denies chest pain and pressure. She denies SOB. Abdomen is appropriately tender at the incision line. BM+. She acknowledges tenderness, swelling at the dorsum of the left hand extendign to the forearm.       Objective     Physical Exam  Constitutional:       Appearance: Normal appearance. She is not ill-appearing or diaphoretic.   HENT:      Nose: Nose normal.   Eyes:      Extraocular Movements: Extraocular movements intact.      Conjunctiva/sclera: Conjunctivae normal.      Pupils: Pupils are equal, round, and reactive to light.   Cardiovascular:      Rate and Rhythm: Normal rate and regular rhythm.   Pulmonary:      Effort: Pulmonary effort is normal.      Breath sounds: Normal breath sounds.   Abdominal:      Palpations: Abdomen is soft. There is no mass.      Tenderness: There is abdominal tenderness.      Hernia: No hernia is present.   Musculoskeletal:         General: Normal range of motion.      Cervical back: Normal range of motion.   Skin:     General: Skin is warm and dry.   Neurological:      General: No focal deficit present.      Mental Status: She is alert and oriented to person, place, and time.      Cranial Nerves: No cranial nerve deficit.      Motor: No weakness.      Comments: Hyposensitivity and numbness in the back and thumb side of the hand and distal dorsal forearm, and weakness of fingers coordination. Notable bruising in the distal radial forearm from attempted IV line access.   Psychiatric:         Mood and Affect: Mood normal.         Behavior: Behavior normal.         Thought Content: Thought content normal.         Judgment: Judgment normal.     Viable flaps, few bruises in recovery phase are still notable on the inferior mastectomy skin flaps  "bilaterally.     Last Recorded Vitals  Blood pressure 117/83, pulse 72, temperature 36.1 °C (97 °F), temperature source Temporal, resp. rate 18, height 1.702 m (5' 7.01\"), weight 90.3 kg (199 lb 1.2 oz), SpO2 97 %.  Intake/Output last 3 Shifts:  I/O last 3 completed shifts:  In: 240 (2.7 mL/kg) [P.O.:240]  Out: 4140.5 (45.9 mL/kg) [Urine:4000 (1.2 mL/kg/hr); Drains:140.5]  Weight: 90.3 kg     Relevant Results      Scheduled medications  acetaminophen, 650 mg, oral, q6h ADY  amitriptyline, 25 mg, oral, Nightly  aspirin, 81 mg, oral, Daily  docusate sodium, 100 mg, oral, BID  enoxaparin, 40 mg, subcutaneous, q24h ADY  gabapentin, 600 mg, oral, q12h  methocarbamol, 1,000 mg, oral, q8h ADY  polyethylene glycol, 17 g, oral, Daily      Continuous medications     PRN medications  PRN medications: diphenhydrAMINE, magnesium hydroxide, naloxone, naloxone, ondansetron **OR** ondansetron, traMADol, traMADol  Results for orders placed or performed during the hospital encounter of 11/10/23 (from the past 24 hour(s))   CBC   Result Value Ref Range    WBC 5.1 4.4 - 11.3 x10*3/uL    nRBC 0.0 0.0 - 0.0 /100 WBCs    RBC 2.94 (L) 4.00 - 5.20 x10*6/uL    Hemoglobin 8.9 (L) 12.0 - 16.0 g/dL    Hematocrit 27.7 (L) 36.0 - 46.0 %    MCV 94 80 - 100 fL    MCH 30.3 26.0 - 34.0 pg    MCHC 32.1 32.0 - 36.0 g/dL    RDW 13.2 11.5 - 14.5 %    Platelets 217 150 - 450 x10*3/uL   Renal function panel   Result Value Ref Range    Glucose 83 74 - 99 mg/dL    Sodium 139 136 - 145 mmol/L    Potassium 3.5 3.5 - 5.3 mmol/L    Chloride 103 98 - 107 mmol/L    Bicarbonate 27 21 - 32 mmol/L    Anion Gap 13 10 - 20 mmol/L    Urea Nitrogen 6 6 - 23 mg/dL    Creatinine 0.58 0.50 - 1.05 mg/dL    eGFR >90 >60 mL/min/1.73m*2    Calcium 8.6 8.6 - 10.6 mg/dL    Phosphorus 3.3 2.5 - 4.9 mg/dL    Albumin 3.2 (L) 3.4 - 5.0 g/dL   Vascular US upper extremity venous duplex right   Result Value Ref Range    BSA 2.07 m2                Assessment/Plan   I have seen Ms. Rubio " this afternoon. Her postoperative course has been uneventful except for significant bruising of the mastectomy skin flaps bilaterally which responded very well to nitropaste. Possible superficial vein phlebitis of the right upper extremity. For which she will receive, early afternoon, ultrasound evaluation. Management was reviewed and will be conservative in case of SVT. Finally, my exam points towards of signs of superficial radial nerve injury following IV line access, which have been improving , and we discussed observation first, and continuation of had rehabilitation. Patient is fit for discharge, and the rest of her discharge plan of care was discussed thoroughly by plastic surgery KENA[facility] team and myself. And, I have answered all her questions to the best of my knowledge.   I will be seeing her next Monday.          I spent 25 minutes in the professional and overall care of this patient.      Crystal Chicas MD

## 2023-11-16 NOTE — DOCUMENTATION CLARIFICATION NOTE
PATIENT:               CHEYANNE BOUCHER  ACCT #:                  [id number]  MRN:                       [id number]  :                       1970  ADMIT DATE:       11/10/2023 5:37 AM  DISCH DATE:  RESPONDING PROVIDER #:        [id number]          PROVIDER RESPONSE TEXT:    Acute blood loss anemia on 23    CDI QUERY TEXT:    [facility]_Anemia Specificity    Instruction:    Based on your assessment of the patient and the clinical information, please provide the requested documentation by clicking on the appropriate radio button and enter any additional information if prompted.    Question: Please further clarify the diagnosis of anemia as    When answering this query, please exercise your independent professional judgment. The fact that a question is being asked, does not imply that any particular answer is desired or expected.    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 Barahona of breast surgery and Dr. Gorman of plastic surgery for bilateral cancer risk reduction mastectomies followed by immediate bilateral ALEKSANDR free flap reconstruction on 2023.?    Clinical Indicators:  Per the 23 Plastic Surgery Progress Note:  ?Acute postoperative anemia  - Incoming/baseline HGB approx. 13 (as of recent labs 2023)  - AM Hbg : 7.7 with s/s of lightheadedness  -> transfused 1u prbc  - AM hgb : 9.1  Monitor for S/S of bleeding or symptomatic anemia  Monitor AM CBC?    Vital signs on 11/10/23: T-36.7, HR-74, RR-16, BP-134/74, POX-96% (not documented if on room air or if supplemental oxygen)    Treatment: ?Monitor for S/S of bleeding or symptomatic anemia; Monitor AM CBC?    Risk Factors: Lightheadedness, Surgical EBL  on 11/10/23 was 150mL; ?never smoker? (11/10/23 Anesthesia Consult Note); not documented if patient uses alcohol/drugs  Options provided:  -- Iron deficiency anemia  -- Acute blood loss anemia on 11/13/23  -- Macrocytic anemia  -- Chronic blood loss anemia  -- Anemia due to chronic disease, Please specify chronic disease below  -- Other - I will add my own diagnosis  -- Refer to Clinical Documentation Reviewer    Query created by: Nancy Rocha on 11/14/2023 2:04 PM      Electronically signed by:  JACINTA BRICEÑO PA-C 11/16/2023 1:43 PM

## 2023-11-16 NOTE — PROGRESS NOTES
Plastic Surgery Progress Note    Patient Name: Cheyanne Rubio  MRN: [id number]  Date:  11/16/23     Subjective  Planning for DC today. Pain well controlled on current pain regimen. +BM this AM. Left ROBBY drain removed this morning. RUE US pending this AM. Denies any fever, chills, night sweats, CP, SOB, palpitations, nausea, vomiting.    Overnight Events  NAOE    Objective    Physical Exam   Constitutional: NAD.   Eyes: Clear sclera. EOMI.   ENMT: Moist mucous membranes, no apparent injuries or lesions.  Head/Neck: NCAT.  Cardiovascular: RRR.  Respiratory/Thorax: Unlabored respirations on RA. Symmetric chest rise.   Gastrointestinal: Abdomen soft, non-distended. Prevena vac in place, holding suction at -125 mmHg, no leaks or obstruction, umbilical region covered with xeroform and Aquacel Ag. Minimally tender to palpation along wound vac.   Genitourinary: Voiding independently   Neurological: A&Ox3.   Skin: Warm and dry with no lesions or rashes.   Breast: Bilateral ALEKSANDR free flap recipient site soft, warm to touch, with some residual bruising beneath bilat breasts R worse than L. X1 blister to left breast between incision and bruising. Flap approximated with intact sutures, no evidence of dehiscence. Intact doppler pulses at the upper, medial corners near the sternum bilaterally. x1 ROBBY drain to each breast with ssg output.   Extremities: Bilat hands with pitting edema R >L. Audible radial pulse on doppler. RUE warm, soft, compressible with mild edema. R hand ttp upon flexion with minimal erythema to dorsal aspect. Left hand with decreased sensation in digits 1-2 and thenar aspect.   Diagnostics   No results found for this or any previous visit (from the past 24 hour(s)).    No results found.    Current Medications  Scheduled medications  acetaminophen, 650 mg, oral, q6h ADY  amitriptyline, 25 mg, oral, Nightly  aspirin, 81 mg, oral, Daily  docusate sodium, 100 mg, oral, BID  enoxaparin, 40 mg, subcutaneous, q24h  ADY  gabapentin, 600 mg, oral, q12h  methocarbamol, 1,000 mg, intravenous, q8h  polyethylene glycol, 17 g, oral, Daily      Continuous medications       PRN medications  PRN medications: diphenhydrAMINE, magnesium hydroxide, naloxone, naloxone, ondansetron **OR** ondansetron, traMADol, traMADol     Assessment  Cheyanne is a 53 year old female with a past medical history of endometriosis, thyroid nodule, vitamin D deficiency, uterine polyp and 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 Barahona of breast surgery and Dr. Gorman of plastic surgery for bilateral cancer risk reduction mastectomies followed by immediate bilateral ALEKSANDR free flap reconstruction on 11/20/2023.      Plan/Recommendations  # S/p ALEKSANDR free flap reconstruction, complex closure, application wound vac   - Continue serial flap assessments Q shift per nursing       ·  Assess clinical appearance of flap (color, warmth, turgor)       ·  Nursing to notify plastic surgery team immediately if there are any acute changes          (Including pallor, temperature change, bleeding, etc.)  - Do not wear surgical bra as it compresses ALEKSANDR free flaps  - Bilateral breast incisions BREN   - Discontinue nitropaste 11/14  - Continue ROBBY drain care per nursing (x1 to R breast)       ·  Empty and record output at least every 8 hours       ·  Strip drains TID and PRN to avoid drain obstruction        ·  Removal per plastic surgery team when output <30cc output/24hrs for x2 consecutive days       ·  Call plastics if drain output is >30cc in an hour or greater than 200cc over 8 hours       .  L drain removed 11/16 with no complications   - Continue incisional wound vac therapy to Pfannenstiel incision at lower abdomen x14 days post op (end date ~11/24)        ·  Settings: 125 mmHg low continuous suction        ·  Please keep dressing C/D/I, reinforce PRN        ·  Please alert plastics team with any concerns regarding vac        ·  Transition to  prevena upon DC  - Maintain beach chair positioning        ·  Head elevated and hips flexed at least 60 degrees, knees bent and elevated on pillows   - Avoid positioning that would apply pressure to flap region or incisions   - PT eval 11/14  - Parker removed 11/14 with appropriate UOP  - Continue to record and monitor strict I&Os  - S/p x3 doses IV ancef postoperatively   - BP goal of 110/60 minimum to ensure adequate flap perfusion   - SQ Heparin [id number], transition to Lovenox on POD #3 (PM 11/13)   - Daily ASA to start PM POD #1 then continue for x30 days postoperatively (end date 12/11)   - Encourage IS x10 every hour while awake   - Monitor VS/pulse ox [id number]   - Monitor AM CBC/RFP/Mg     # Right hand pain/swelling; Left hand numbness  - 11/14: Complaining of right hand pain, swelling, and decreased  strength   - 11/14: Attempted lidocaine patch to dorsal aspect of R hand. Developed erythema extending up the arm. Discontinued in setting of skin irritation and no improvement of pain.   - On exam 11/15, swelling/pain/and numbness all improving  - 11/16: pain and swelling to RUE stable. Some swelling noted to proximal forearm and minor redness to dorsal hand. RUE US pending.     # Acute postoperative pain  - Current regimen       ·  Tylenol 650mg PO [id number]        ·  Tramadol 50/100mg PO [id number] PRN moderate/severe pain       ·  Gabapentin 600mg BID        ·  Robaxin 1000mg IV [id number]        ·  Dilaudid PCA discontinued 11/14  - Acute pain following, appreciate recs        ·  Ropivacaine pain catheters x2 per anesthesia discontinued 11/13       ·  Amitriptyline 25mg nightly   - Bowel regimen: colace 100mg BID  - Benadryl 25mg IV PRN itching while taking narcotics      # Acute postoperative anemia   - Incoming/baseline HGB ~13 (as of recent labs 11/6/2023)  - AM Hbg 11/13: 7.7 with s/s of lightheadedness   -> transfused 1u prbc  - Monitor for S/S of bleeding or symptomatic anemia   - Monitor AM CBC       F:  Encourage PO intake   E: Replete PRN   -> K+ repleted 11/12-14  N: Regular diet    GI: Continue bowel regimen with Colace BID, Miralax, Milk of mag PRN       ·  IV Zofran 4mg PRN for nausea/vomiting      DVT ppx:  - ASA 81 to start PM POD#1 then continue once daily x30 days postoperatively (end date 12/11)  - SQ heparin TID x 3 days then transition to SQ Lovenox on POD#3 (PM 11/13)   - Use of SCDs while on bedrest       Disposition: RUE US pending. Home PT, skilled nursing, and wheeled walker ordered. Anticipate DC Thurs 11/16. Follow up visit scheduled for 11/20.      Patient and plan discussed with Dr. Mata, PA-C  Plastic and Reconstructive Surgery  Epic chat, Pager [phone], Team phones: [phone]

## 2023-11-16 NOTE — PROGRESS NOTES
Physical Therapy    Physical Therapy Treatment    Patient Name: Cheyanne Rubio  MRN: [id number]  Today's Date: 11/16/2023  Time Calculation  Start Time: 1430  Stop Time: 1442  Time Calculation (min): 12 min       Assessment/Plan   PT Assessment  Evaluation/Treatment Tolerance: Patient tolerated treatment well  Medical Staff Made Aware: Yes  End of Session Communication:  (Regina Blanchard PA-C)  End of Session Patient Position: Up in chair, Alarm off, caregiver present (Spouse present)     PT Plan  Treatment/Interventions: Bed mobility, Transfer training, Stair training, Gait training, Balance training, Strengthening, Endurance training, Range of motion, Therapeutic exercise, Therapeutic activity, Home exercise program, Positioning  PT Plan: Skilled PT  PT Frequency: 3 times per week  PT Discharge Recommendations: No PT needed after discharge  Equipment Recommended upon Discharge: Wheeled walker  PT Recommended Transfer Status: Stand by assist, Assistive device  PT - OK to Discharge: Yes (Eval complete, refer to dispo)      General Visit Information:   PT  Visit  PT Received On: 11/16/23  Response to Previous Treatment: Patient with no complaints from previous session.  General  Reason for Referral: s/p bilateral risk reducing mastectomy with Dr. Barahona and bilateral ALEKSANDR reconstruction with Dr. 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 received seated in bedside chair, endorsing increased pain after returning from US though pleasant and agreeable to participate in session. (Per Regina Blanchard PA-C (plastics) R  UE US preliminary results shows thrombophlebitis.)    Subjective   Precautions:  Precautions  Medical Precautions: Abdominal precautions, Fall precautions  Post-Surgical Precautions: Abdominal surgery precautions  Precautions Comment: Head elevated and hips flexed at least 60 degrees, knees bent and elevated on pillows    Objective   Pain:  Pain Assessment  Pain Assessment: 0-10  Pain Score: 8  Pain Type: Surgical pain, Acute pain  Pain Location: Abdomen  Pain Orientation: Lower  Pain Descriptors: Discomfort, Aching  Pain Interventions:  (Pt. reports RN recently medicated)    Cognition:  Cognition  Overall Cognitive Status: Within Functional Limits  Orientation Level: Oriented X4    Extremity/Trunk Assessments:  RLE   RLE : Within Functional Limits  LLE   LLE : Within Functional Limits    Activity Tolerance:  Activity Tolerance  Endurance: Tolerates 10 - 20 min exercise with multiple rests  Activity Tolerance Comments: Pt. tolerated ambulating increased distance this date without report of incresed fatigue.    Treatments:  Bed Mobility  Bed Mobility: No (Pt. seated in bedside chair at start/end of session.)    Ambulation/Gait Training  Ambulation/Gait Training Performed: Yes  Ambulation/Gait Training 1  Surface 1: Level tile  Device 1: Rolling walker  Assistance 1: Close supervision  Quality of Gait 1:  (forward flexed posture as per precautions)  Comments/Distance (ft) 1: 75 ft x2 (Pt. completed stair training between ambulation trials, no seated rest break.)    Transfers  Transfer: Yes  Transfer 1  Transfer From 1: Chair with arms to  Transfer to 1: Stand  Technique 1: Sit to stand  Transfer Device 1: Walker  Transfer Level of Assistance 1: Close supervision  Trials/Comments 1: Cues for proper UE placement and technique    Transfers 2  Transfer From 2: Stand to  Transfer to 2: Chair with arms  Technique 2: Stand to sit  Transfer Device 2: Walker  Transfer Level of Assistance 2: Close supervision  Trials/Comments  2: Cues to reach posteriorly to maximize stability and safety with descent    Stairs  Stairs: Yes  Stairs  Rails 1: Left  Curb Step 1: No  Device 1: Railing  Assistance 1: Contact guard  Comment/Number of Steps 1: ascend/descend 3 stairs with non-reciprocal pattern    Outcome Measures:  [facility] Basic Mobility  Turning from your back to your side while in a flat bed without using bedrails: A lot  Moving from lying on your back to sitting on the side of a flat bed without using bedrails: A lot  Moving to and from bed to chair (including a wheelchair): A little  Standing up from a chair using your arms (e.g. wheelchair or bedside chair): A little  To walk in hospital room: A little  Climbing 3-5 steps with railing: A lot  Basic Mobility - Total Score: 15    Education Documentation  Precautions, taught by Eusebia Quijano, PT at 11/16/2023  2:50 PM.  Learner: Significant Other, Patient  Readiness: Acceptance  Method: Explanation, Demonstration  Response: Verbalizes Understanding    Body Mechanics, taught by Eusebia Quijano PT at 11/16/2023  2:50 PM.  Learner: Significant Other, Patient  Readiness: Acceptance  Method: Explanation, Demonstration  Response: Verbalizes Understanding    Mobility Training, taught by Eusebia Quijano PT at 11/16/2023  2:50 PM.  Learner: Significant Other, Patient  Readiness: Acceptance  Method: Explanation, Demonstration  Response: Verbalizes Understanding    Education Comments  No comments found.        OP EDUCATION:       Encounter Problems       Encounter Problems (Active)       Balance       Patient to demo static standing with unilateral UE support, performing single UE task with SBA, no sway or LOB x 2 mins for functional carryover  (Progressing)       Start:  11/14/23    Expected End:  11/28/23               Mobility       STG - Patient will ambulate >/= 50' Tamiko with LRAD (Progressing)       Start:  11/14/23    Expected End:  11/28/23            Patient to ascend/descend >/= 2 stairs  with CGAx1 to demo ability to safely traverse KATIE. Progress as tolerated as pt. has 10-15 stairs within home.  (Progressing)       Start:  11/14/23    Expected End:  11/28/23               Transfers       STG - Patient will perform bed mobility Tamiko (Progressing)       Start:  11/14/23    Expected End:  11/28/23            STG - Patient will transfer sit to and from stand Tamiko with LRAD (Progressing)       Start:  11/14/23    Expected End:  11/28/23

## 2023-11-16 NOTE — PROGRESS NOTES
"Cheyanne Rubio is a 53 y.o. female on day 6 of admission  s/p b/l risk reducing mastectomy with Dr. Barahona and b/l ALEKSANDR reconstruction by Dr. Gorman on 11/10 for PALB2 mutation.    Subjective   Patient reports she is still having left thumb numbness but her RUE is improved from yesterday. Breast pain is well controlled. No nausea or vomiting.  Had a BM yesterday.     Objective   General: Awake, alert, conversive  Respiratory: even, unlabored on room air  Chest: bilateral breast incisions well approximated and open to air without surrounding erythema, edema, or drainage. 2 cm hemorrhagic blister on left breast which is 2 cm lateral to mid axillary line and 2 cm beneath incisions  Cardiac: regular rate  Skin: warm, dry  Musculoskeletal: MUNIR  Extremities: no edema   Neuro: 5/5 strength with R. , arm flexion, and arm extension. Sensation intact across RLE. 5/5 strength with L. , arm flexion, and arm extension. Sensation to light touch reduced in left thumb.    Psychological: appropriate mood/affect    Last Recorded Vitals  Blood pressure 118/82, pulse 69, temperature 36.4 °C (97.5 °F), temperature source Temporal, resp. rate 18, height 1.702 m (5' 7.01\"), weight 90.3 kg (199 lb 1.2 oz), SpO2 95 %.  Intake/Output last 3 Shifts:  I/O last 3 completed shifts:  In: 240 (2.7 mL/kg) [P.O.:240]  Out: 4140.5 (45.9 mL/kg) [Urine:4000 (1.2 mL/kg/hr); Drains:140.5]  Weight: 90.3 kg     Relevant Results  Scheduled medications  acetaminophen, 650 mg, oral, q6h ADY  amitriptyline, 25 mg, oral, Nightly  aspirin, 81 mg, oral, Daily  docusate sodium, 100 mg, oral, BID  enoxaparin, 40 mg, subcutaneous, q24h ADY  gabapentin, 600 mg, oral, q12h  methocarbamol, 1,000 mg, intravenous, q8h  polyethylene glycol, 17 g, oral, Daily      Continuous medications     PRN medications  PRN medications: diphenhydrAMINE, magnesium hydroxide, naloxone, naloxone, ondansetron **OR** ondansetron, traMADol, traMADol      Assessment/Plan   Principal " Problem:    Monoallelic mutation of PALB2 gene  Active Problems:    Status post breast reconstruction    PONV (postoperative nausea and vomiting)    Recent URI    Ms. Rubio, 53yoF s/p b/l risk reducing mastectomy with Dr. Barahona and b/l ALEKSANDR reconstruction by Dr. Gorman on 11/10 for PALB2 mutation. New motor/sensory changes may be related to intra-operative positioning as well as pain from infiltrated IV. Otherwise recovering well postoperatively and ready for discharge from a surgical oncology standpoint.     Recommendations:   - will continue to monitor   - remainder of care per primary team      Discussed with Attending Physician.    Danni Gale MD  Surgical Oncology, [facility]  [phone]

## 2023-11-16 NOTE — SIGNIFICANT EVENT
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 Barahona of breast surgery and Dr. Gorman of plastic surgery for bilateral cancer risk reduction mastectomies followed by immediate bilateral ALEKSANDR free flap reconstruction on 11/20/2023.      Upon PM assessment patient found resting in bed comfortably. Pain well controlled and minimal at this time. Continues to endorse regular +flatus, but no BM yet. Denies abdominal pain, bloating, nausea or vomiting. Denies any fever, chills, night sweats, CP or SOB.      Objective    /56   Pulse 75   Temp 36.5 °C (97.7 °F) (Temporal)   Resp 16   Ht 1.702 m (5' 7.01\")   Wt 90.3 kg (199 lb 1.2 oz)   SpO2 95%   BMI 31.17 kg/m²       Constitutional: NAD.   Eyes: Clear sclera. EOMI.   ENMT: Moist mucous membranes, no apparent injuries or lesions.  Head/Neck: NCAT.  Cardiovascular: RRR.  Respiratory/Thorax: Unlabored respirations on RA. Symmetric chest rise.   Gastrointestinal: Abdomen soft, non-distended. Prevena vac in place, holding suction at -125 mmHg, no leaks or obstruction, umbilical region covered with xeroform and Aquacel Ag. Minimally tender to palpation along wound vac.   Genitourinary: Voiding independently   Neurological: A&Ox3.   Skin: Warm and dry with no lesions or rashes.   Breast: Bilateral ALEKSANDR free flap recipient site soft, warm to touch, with some residual bruising beneath bilat breasts. X1 blister to R breast between incision and bruising. Linear skin tear to R IMF fold medially. Flap approximated with intact sutures, no evidence of dehiscence. Intact doppler pulses at the upper, medial corners near the sternum bilaterally. x1 ROBBY drain to each breast with ssg output.   Extremities: Right hand swelling improved. Audible radial pulse on doppler. RUE warm, soft, " compressible. Left hand with slightly diminished sensation in digits 1-2 and thenar aspect.    Assessment/Plan:   - Bilateral ALEKSANDR free flaps stable in PM interval   - Continue to encourage scheduled/PRN bowel regimen  - Anticipate discharge home in AM   - Remainder of care per daily progress notes     Gia Newell PA-C  Plastic and Reconstructive Surgery   [place]  Pager #[phone]  Team phones: [phone]

## 2023-11-16 NOTE — CARE PLAN
The patient's goals for the shift include        Problem: Pain  Goal: Takes deep breaths with improved pain control throughout the shift  Outcome: Progressing  Goal: Turns in bed with improved pain control throughout the shift  Outcome: Progressing  Goal: Walks with improved pain control throughout the shift  Outcome: Progressing  Goal: Performs ADL's with improved pain control throughout shift  Outcome: Progressing  Goal: Participates in PT with improved pain control throughout the shift  Outcome: Progressing  Goal: Free from opioid side effects throughout the shift  Outcome: Progressing  Goal: Free from acute confusion related to pain meds throughout the shift  Outcome: Progressing       O

## 2023-11-20 ENCOUNTER — OFFICE VISIT (OUTPATIENT)
Dept: PLASTIC SURGERY | Facility: CLINIC | Age: 53
End: 2023-11-20
Payer: COMMERCIAL

## 2023-11-20 VITALS
HEIGHT: 67 IN | TEMPERATURE: 98.2 F | BODY MASS INDEX: 32.18 KG/M2 | HEART RATE: 64 BPM | DIASTOLIC BLOOD PRESSURE: 66 MMHG | SYSTOLIC BLOOD PRESSURE: 100 MMHG | WEIGHT: 205 LBS

## 2023-11-20 DIAGNOSIS — Z98.890 STATUS POST BREAST RECONSTRUCTION: ICD-10-CM

## 2023-11-20 DIAGNOSIS — G89.18 POST-OP PAIN: ICD-10-CM

## 2023-11-20 DIAGNOSIS — G89.18 POST-OP PAIN: Primary | ICD-10-CM

## 2023-11-20 PROCEDURE — 1036F TOBACCO NON-USER: CPT

## 2023-11-20 PROCEDURE — 99024 POSTOP FOLLOW-UP VISIT: CPT

## 2023-11-20 RX ORDER — CYCLOBENZAPRINE HCL 5 MG
5 TABLET ORAL 3 TIMES DAILY
Qty: 6 TABLET | Refills: 0 | Status: SHIPPED | OUTPATIENT
Start: 2023-11-20 | End: 2023-11-22

## 2023-11-20 RX ORDER — GABAPENTIN 300 MG/1
600 CAPSULE ORAL EVERY 12 HOURS
Qty: 56 CAPSULE | Refills: 0 | Status: SHIPPED | OUTPATIENT
Start: 2023-11-20 | End: 2023-12-28 | Stop reason: RX

## 2023-11-20 RX ORDER — ONDANSETRON 4 MG/1
4 TABLET, FILM COATED ORAL EVERY 8 HOURS PRN
Qty: 15 TABLET | Refills: 0 | Status: SHIPPED | OUTPATIENT
Start: 2023-11-20 | End: 2023-11-25

## 2023-11-20 RX ORDER — IBUPROFEN 600 MG/1
600 TABLET ORAL EVERY 6 HOURS PRN
Qty: 60 TABLET | Refills: 1 | Status: SHIPPED | OUTPATIENT
Start: 2023-11-20 | End: 2023-12-11 | Stop reason: SDUPTHER

## 2023-11-20 RX ORDER — TRAMADOL HYDROCHLORIDE 50 MG/1
50 TABLET ORAL EVERY 6 HOURS PRN
Qty: 20 TABLET | Refills: 0 | Status: CANCELLED | OUTPATIENT
Start: 2023-11-20 | End: 2023-11-25

## 2023-11-20 RX ORDER — TRAMADOL HYDROCHLORIDE 50 MG/1
50 TABLET ORAL EVERY 6 HOURS PRN
Qty: 15 TABLET | Refills: 0 | Status: SHIPPED | OUTPATIENT
Start: 2023-11-20 | End: 2023-11-27

## 2023-11-20 RX ORDER — METHOCARBAMOL 750 MG/1
750 TABLET, FILM COATED ORAL 4 TIMES DAILY
Qty: 40 TABLET | Refills: 0 | Status: SHIPPED | OUTPATIENT
Start: 2023-11-20 | End: 2024-03-30 | Stop reason: HOSPADM

## 2023-11-20 ASSESSMENT — ENCOUNTER SYMPTOMS
PSYCHIATRIC NEGATIVE: 1
TREMORS: 0
MUSCULOSKELETAL NEGATIVE: 1
EYES NEGATIVE: 1
CONSTITUTIONAL NEGATIVE: 1
LIGHT-HEADEDNESS: 1
GASTROINTESTINAL NEGATIVE: 1
WEAKNESS: 0
ENDOCRINE NEGATIVE: 1
NUMBNESS: 1
CARDIOVASCULAR NEGATIVE: 1
RESPIRATORY NEGATIVE: 1

## 2023-11-20 ASSESSMENT — PAIN SCALES - GENERAL: PAINLEVEL: 4

## 2023-11-21 DIAGNOSIS — Z98.890 STATUS POST BREAST RECONSTRUCTION: ICD-10-CM

## 2023-11-21 LAB
LABORATORY COMMENT REPORT: NORMAL
PATH REPORT.FINAL DX SPEC: NORMAL
PATH REPORT.GROSS SPEC: NORMAL
PATH REPORT.RELEVANT HX SPEC: NORMAL
PATH REPORT.TOTAL CANCER: NORMAL

## 2023-11-21 RX ORDER — CYCLOBENZAPRINE HCL 5 MG
5 TABLET ORAL EVERY 8 HOURS PRN
Qty: 6 TABLET | Refills: 0 | Status: SHIPPED | OUTPATIENT
Start: 2023-11-21 | End: 2023-11-23

## 2023-11-21 RX ORDER — METHOCARBAMOL 750 MG/1
750 TABLET, FILM COATED ORAL 4 TIMES DAILY PRN
Qty: 8 TABLET | Refills: 0 | Status: SHIPPED | OUTPATIENT
Start: 2023-11-22 | End: 2023-11-21 | Stop reason: SINTOL

## 2023-11-21 ASSESSMENT — ENCOUNTER SYMPTOMS
RESPIRATORY NEGATIVE: 1
ENDOCRINE NEGATIVE: 1
CONSTITUTIONAL NEGATIVE: 1
MUSCULOSKELETAL NEGATIVE: 1
TREMORS: 0
WEAKNESS: 0
EYES NEGATIVE: 1
GASTROINTESTINAL NEGATIVE: 1
CARDIOVASCULAR NEGATIVE: 1
PSYCHIATRIC NEGATIVE: 1

## 2023-11-21 NOTE — PROGRESS NOTES
"Subjective   Post-operative visit  Cheyanne Rubio is a 53 y.o. female who is here for a post-operative check from surgery 11/10/23 Bilateral mastectomy with ALEKSANDR reconstruction.      Review of Systems  Review of Systems   Constitutional: Negative.    HENT: Negative.     Eyes: Negative.    Respiratory: Negative.     Cardiovascular: Negative.    Gastrointestinal: Negative.    Endocrine: Negative.    Genitourinary: Negative.    Musculoskeletal: Negative.    Skin: Negative.    Neurological:  Negative for tremors, weakness, light-headedness and numbness.        Continues to report that the left thumb and distal radial aspect of the forearm is \"sleepy\", poor co-ordination when typing resulting in multiple typos.    Psychiatric/Behavioral: Negative.         Objective   Physical Exam  Constitutional:       Appearance: Normal appearance.   HENT:      Nose: Nose normal.   Eyes:      Extraocular Movements: Extraocular movements intact.      Conjunctiva/sclera: Conjunctivae normal.      Pupils: Pupils are equal, round, and reactive to light.   Cardiovascular:      Rate and Rhythm: Normal rate and regular rhythm.      Pulses: Normal pulses.      Heart sounds: Normal heart sounds.   Pulmonary:      Effort: Pulmonary effort is normal.      Breath sounds: Normal breath sounds.   Abdominal:      General: Bowel sounds are normal. There is no distension.      Palpations: Abdomen is soft. There is no mass.      Tenderness: There is no abdominal tenderness. There is no guarding.      Hernia: No hernia is present.      Comments: Superficial sloughing of the skin at the umbilicus. Similar findings at the inferior incision with minimal erythema.    Musculoskeletal:         General: Tenderness present. No swelling, deformity or signs of injury. Normal range of motion.      Cervical back: Normal range of motion and neck supple.   Skin:     General: Skin is warm.      Findings: Erythema present. No rash.      Comments: Inferior mastectomy skin " flaps are erythematous and edematous.  Umbilicus is dusky blue with signs of superficial ischemic change.   Neurological:      General: No focal deficit present.      Mental Status: She is alert and oriented to person, place, and time.      Cranial Nerves: No cranial nerve deficit.      Sensory: Sensory deficit present.      Motor: No weakness.      Coordination: Coordination normal.   Psychiatric:         Mood and Affect: Mood normal.         Behavior: Behavior normal.         Thought Content: Thought content normal.         Judgment: Judgment normal.       Assessment/Plan :    I had the pleasure of seeing Ms. Rubio today. She shared with me her dissatisfaction with the current shape and volume of the breasts. Ms. Rubio feels that the breasts are still big, full laterally and superiorly, and do not look rounded.   It is difficult for her to wear bra, especially with concerns of compressing the breasts.   She expressed her desire of smaller sized breasts, and shared her concern regarding how long she needs to wait for the revision surgery.   I reassured Ms. Rubio that the current look and shape is temporary. Secondary revisions are natural to ALEKSANDR flaps reconstruction, a topic we reviewed at least twice prior to surgery. Not to mention, over correction is important to offset volume loss which normally happens and could reach 25% of the flap initial volume. I also reminded her that she was admen not to have implants in the breast, and therefore, it will be much easier in surgery and more rewarding to her to reduce the breasts compared to under correct the breast and then to realize that extra augmentation is required. Finally, breasts are still swollen and edematous. The mastectomy skin flaps while less erythematous compared to last visit, they are still swollen and edematous. The flap also feels the same. As the edema subsides, the flap will shrink furthermore.   As with regards to upper pole fullness, I discussed with  patient that the flap will sag eventually, and the upper pole fullness will decrease. However, I reminded her that she is comparing this fullness to grade III ptotic breasts of DDD, E size. Aesthetic breasts need upper pole fullness.  All our discussion helped the patient understand the journey of recovery and reassured her that as she heals, she'll continue to note improvement, and that secondary revision surgery will be effective to address deformities. Patient felt much better by the end of this discussion.     Next, we discussed rehabilitation and physical therapy to enhance recovery for the superficial radial nerve, which appears to be injured during A line insertion as the patient continues to experience poor coordination, and weak-absent soft touch at the back of the left thumb compared with the rest of her forearm.    Finally, I reviewed wound care with patient for the umbilicus and the inferior incision.     Follow-up as ordered: To see her in December 11 or 14.

## 2023-11-22 NOTE — OP NOTE
Creation Myocutaneous Flap Transverse Rectus Abdominis for immediate reconstruction, BILATERAL Breast (B), Reconstruction Abdominal Wall (B) Operative Note     Date: 11/10/2023 - 2023  OR Location: [facility] OR    Name: Cheyanne Rubio : 1970, Age: 53 y.o., MRN: [id number], Sex: female    Diagnosis  Pre-op Diagnosis     * Family history of malignant neoplasm of breast [Z80.3]     * Monoallelic mutation of PALB2 gene [Z15.01, Z15.89, Z15.09]     * Macromastia [N62] Post-op Diagnosis     * Family history of malignant neoplasm of breast [Z80.3]     * Monoallelic mutation of PALB2 gene [Z15.01, Z15.89, Z15.09]     * Macromastia [N62]     Procedures  Creation Myocutaneous Flap Transverse Rectus Abdominis for immediate reconstruction, BILATERAL Breast    Reconstruction Abdominal Wall    Bilateral skin sparing mastectomy  99840 - IN MASTECTOMY SIMPLE COMPLETE      Surgeons   Panel 1:     * Crystal Gorman - Primary  Panel 2:     * Lynda Barahona - Primary    Resident/Fellow/Other Assistant:  Surgeon(s) and Role:  Panel 1:     * Beth Cedillo PA-C - Assisting     * OTILIO Brown-CNP - Assisting     * Jaye Nicholas MD - Resident - Assisting  Panel 2:     * Guevara Rodriguez MD - Resident - Assisting    Procedure Summary  Anesthesia: General  ASA: I  Anesthesia Staff: Anesthesiologist: Claudine Mckeon MD; Kwesi Forrest DO; Domenic Velasco MD  CRNA: OTILIO Webber-CRNA  C-AA: SHRADDHA Dupree  Anesthesia Resident: Peña Alonso MD  Estimated Blood Loss: 150mL  Intra-op Medications:   Medication Name Total Dose   lidocaine PF (Xylocaine) 10 mg/mL (1 %) injection 15 mL   sodium chloride 0.9 % irrigation solution 3,000 mL   heparin (porcine) 2,500 Units in sodium chloride 0.9 % 250 mL irrigation 2,500 Units   ketamine (Ketalar) 500 mg in dextrose 5 % in water (D5W) 250 mL (2 mg/mL) infusion 89.55 mg   ropivacaine (PF) in NS cmpd (Naropin) 1000 mg/500 mL (0.2%) infusion 2 mL               Anesthesia Record               Intraprocedure I/O Totals          Intake    Dexmedetomidine 0.00 mL    The total shown is the total volume documented since Anesthesia Start was filed.    Ketamine 0.00 mL    The total shown is the total volume documented since Anesthesia Start was filed.    LR 4800.00 mL    Propofol Drip 0.00 mL    The total shown is the total volume documented since Anesthesia Start was filed.    Phenylephrine Drip 0.00 mL    The total shown is the total volume documented since Anesthesia Start was filed.    Total Intake 4800 mL       Output    Urine 4260 mL    Est. Blood Loss 150 mL    Total Output 4410 mL       Net    Net Volume 390 mL          Specimen:   ID Type Source Tests Collected by Time   1 : RIGHT BREAST Tissue BREAST MASTECTOMY RIGHT SURGICAL PATHOLOGY EXAM Lynda Barahona MD 11/10/2023 1010   2 : LEFT BREAST Tissue BREAST MASTECTOMY LEFT SURGICAL PATHOLOGY EXAM Lynda Barahona MD 11/10/2023 1104        Staff:   Circulator: Chinedu Hutchinson RN; Brandon Call RN; Arnold Keith RN  Relief Circulator: Chinedu Hutchinson RN; Virginia Benz RN  Relief Scrub: Shruthi Sol; Jordyn Christianson RN; Светлана Castorena  Scrub Person: Ace Castillo RN; Светлана Castorena; Pamela Hernández         Drains and/or Catheters:   [REMOVED] Closed/Suction Drain Lateral;Right Breast Bulb 15 Fr. (Removed)   Site Description Healing 11/16/23 1037   Dressing Status Clean;Dry 11/16/23 1037   Drainage Appearance Serosanguineous 11/15/23 2340   Status To bulb suction 11/15/23 2340   Sutures Removed Intact Yes 11/15/23 2340   Output (mL) 2.5 mL 11/16/23 0534       [REMOVED] Closed/Suction Drain Lateral;Left Breast Bulb 15 Fr. (Removed)   Site Description Healing 11/16/23 1038   Dressing Status Clean;Dry 11/16/23 1038   Drainage Appearance Serosanguineous 11/15/23 2340   Status To bulb suction 11/15/23 2340   Sutures Removed Intact Yes 11/15/23 2340   Output (mL) 10 mL 11/16/23 1000       [REMOVED] Urethral Catheter  Non-latex;Straight-tip 16 Fr. (Removed)   Site Assessment Clean;Skin intact 11/14/23 0822   Collection Container Standard drainage bag 11/14/23 0822   Securement Method Leg strap 11/14/23 0822   Reason for Continuing Urinary Catheterization surgical procedures: urological/gynecological, pelvic oncology, anal, prolonged surgical procedure 11/12/23 2000   Output (mL) 450 mL 11/14/23 1600       Tourniquet Times:         Implants:  Implants       Type Name Action Serial No.      Graft GRAFT, STRATTICE, ABDOMINAL, 16 X 20 CM, FIRM - [id number] Implanted             INDICATIONS FOR PROCEDURE:    Cheyanne Rubio is a 53-year-old female with a strong family history of breast cancer, which prompted genetic testing; this was positive for a pathogenic PALB2 mutation. Patient met with Dr. Barahona to discuss management options, and she wished to proceed with bilateral prophylactic mastectomy. The patient then met with me and we discussed to a great length reconstruction options and elected immediate autologous reconstruction with abdominal tissue.  CTA of the abdomen and pelvis was performed, and DIEAs perforators were evaluated. Patient was found suitable for ALEKSANDR/MS-TRAM/TRAM free flap reconstruction. Indications, contraindications, alternatives and risks. She understood and wanted to proceed.     PLAN: Simultaneous skin sparing mastectomy and reconstruction with bilateral free flaps from abdomen. Combined case with Dr. Barahona.     INFORMED CONSENT:   I reviewed the patient's medical history, and her abdominal CTA, and I met with her and evaluated her, and discussed with her the findings of interest to our planned procedure today, and recommendation for surgery. I thoroughly discussed with her risks, benefits, complications, treatment options, non-operative alternatives, expected recovery and outcomes, and possible risks of infection,  bleeding and hematoma, seroma, at the abdomen or the breasts, injury to surrounding tissue,  abdominal wall weakness and hernia, free flap complications ( vascular compromise requiring take back, total or partial flap necrosis with a second free flap/local flap or additional debridement with/without local tissue rearrangement), failure to diagnose a condition, creating the a complication requiring transfusion or operation, need for revision surgeries/procedures, sensory disturbances and complex chronic pain syndrome, and dissatisfaction with outcomes. The risks discussed also included wound healing problems. Medical complications arising from general anesthesia or surgery itself including but not limited to atelectasis, ileus, PE and DVT, cardiac complications and up to mortality were reviewed. The need for blood transfusion, blood thinners, and their complications were adequately covered. Specific discussion of the risks of the anesthetic plan will be discussed by the patient's anesthesia providers. All risks were reviewed in layman´s terms. The patient was given ample time to ask appropriate questions and appeared to be satisfied with the answers provided. All questions were answered and no guarantees have been given regarding the patient's condition and treatment recommendations. The general plan for the postoperative rehabilitation course, including possible need for therapy, was reviewed at great length. Informed consent was signed by all appropriate parties (myself, and my patient). The surgical sites were marked in ink per protocol.    DESCRIPTION OF PROCEDURE IN DETAIL:   The patient was brought to operating theatre on her bed. As soon as she entered the room, safety huddle was performed, and we verified patient's ID, MRN, , planned procedure, and surgical sites, and we reviewed allergies history, and need for antibiotics. All in room were in agreement. Patient was then transferred to OR table, positioned supine with arms at 90 degree with her trunk, supported with arm boards. She was held  securely with strap, and all bony prominences were well padded to avoid pressure sores. SCDs were applied to both legs. General anesthesia was administered by anesthesia personnel, and prior to that while in PACU, the patient received nerve blocks to target the chest and abdomen. After she was put to sleep, Parker catheter was inserted, perforators were marked based on CTA findings verified by ultrasound Doppler. Mastectomy skin incision pattern was drawn earlier by me. Next, the surgical sites: Chest, and abdomen were prepped and draped in standard fashion.   Time out was then performed and incision was announced.     Dr. Barahona and KENNEY worked together, at opposite sites from each other. Dr. Barahona started with right side mastectomy, and I proceeded with flap elevation on the left abdomen.   Left ALEKSANDR flap exploration was performed first. The inferior incision was made, and both SIEVs were identified and prepared for 5 cm, then clipped. After that the inferior incision was deepened down to the level of abdominal wall fascia. Then the lateral and superior incisions on the left side were performed. Dissection proceeded in the suprafascial plane with the aid of [place]-tip Bovie. The dissection proceeded rapidly towards target perforators which were located with ease. Next, the fascia and anterior rectus sheath were opened through the perforating slit of each  connecting them together exposing the right rectus abdominis muscle. The perforators were examined and they were medium sized perforators and from medial and lateral rows. Inclusion of at least two perforators was required to ensure survival of the entire half abdomen.  Therefore, I proceeded with MS-TRAM type 1 on the left. The muscle upper border was divided by the aid of bipolar, as for the inferior border it was divided above the arcuate line to minimize hernia/bulge. All side branches were clipped with small and medium sized hemoclips, the DIEA was  isolated and dissected from the under the muscle with the aid of army-navy retractors and fish hooks. As many motor nerves especially to the lower part of the muscle were preserved as possible.  Papverine was used freely during pedicle dissection. The DIEA was dissected for 5 cm. After completing the vascular work, the abdomen was incised along the midline, and next the umbilicus was freed from the abdominal tissue.   After that, the right side ALEKSANDR was explored, and similar anatomy was noted, and Free TRAM was performed. Pedicle length was also 5 cm. The muscle was divided inferiorly above the arcuate line to avoid hernia/bulge.   By the end of these steps, the flaps were completely dissected off the abdomen, were only perfused by the DIEA/v, warm irrigation with saline followed by papaverine was performed to relief any spasm, and then the pedicle was examined thoroughly along its course to make sure that there was no bleeding from the pedicle, or injury, or spasm. Discrete pulsation of the entire pedicle until their entry to subcutaneous tissue was confirmed.   10 mg ICG was given IV and flushed with Saline and the flaps were spied, the flaps' skin paddles lit up completely.  The mastectomy skin flaps perfusion was also assessed, and adequate perfusion was noted.   After Jun Peace completed her excisions. The specimen were measured. Next, I proceeded with the recipient sites preparation at the chest were prepared. The third costal cartilage was removed to expose the JOHN and IMV. Recipient vessels exploration was performed bilaterally. Hemostasis was evaluated and additional hemostasis was performed. 15 fr drains were left in the skin pockets.   The left MS-TRAM flap was divided and transferred to the right breast.  Flap was rotated to bring flap tail towards the pectoralis insertion, the inferior border of the flap at the cephalic breast tissue, and periumbilical flap tissue inferiorly for lower pole  reconstruction. The shape and size of the breast was immediately appreciated with good volume. Temporary inset was performed, and the skin paddle was marked. The flap was removed and the rest of its skin paddle outside the marked area was depeithelialized. The flap was delivered again to breast, and temporary inset performed, and microanatomies followed:  I repaired the DIEV first to the internal mammary vein, followed by the artery. The following technique was used for microanastomses: With the aid of double approximation clamps (Hd-S), the flap vessel and IM vessel were brought into soft touch, all vessels had matching size ( artery was 1.5 mm, vein 1.5 mm as well), the adventitia was trimmed with curved micro scissor, then end-to-end repair with 9/0 running open loop was performed. After the completion of anastomosis, it was inspected for leak or soft thrombus formation, and there was none. When I first did the DIEV to internal mammary vein, outflow from the recipient vein though the flap vein helped inspecting anastomosis patency and proofing, then the artery established perfusion and out flow from the other DIEV and SIEV was noted. Ischemia time was 17:17-19:26 pm. In all of the anastomoses done on the artery and veins, prior to suturing the back wall, the first wall was inspected to make sure that all bites were full thickness and that the posterior wall was free. After microanastomses were all completed the vessels arrangement was optimized to avoid kink or twist. Hemostasis was checked again at the defect. The flap was inset and pedicle arrangement was confirmed. For insert, key 2/0 PDS sutures were placed at the tail of the flap over the pec insertion, and to reconstruct the lateral border of the diogo-breast, and finally at the lateral and medical low corners of the flap.   After that, the right free TRAM was divided and transferred to left breast. Again, it was temporarily inset as already described.  microanastomoses were to JOHN and IMV this time, and performed as described above. Ischemia time was 20:51-22:20 Pm. Flap key steps in final inset was done similarly. Then both flap inset was completed by two-layer repair to the mastectomy skin using 2/0-3/0 PDS (deep dermal repair) and 3/0 monocryl for subcuticular repair. Both flaps were spied again at the end. And excellent perfusion was observed in both flaps and mastectomy skin.     Finally, abdominal wall closure was performed. Abdominal wall defect was 8x8 cm on each side, so it was reconstructed as a single defect despite the presence of linea alba and remnant of rectus abdominis sheath medially. As a result, the defect was larger than 10 cm when both are combined.Strattice mesh was used and repaired to the aponeurosis of the oblique muscles and remnant of the anterior rectus sheath using PDS 0 in figure of 8 fashion. An opening was made for the umbilicus. The abdominal flap was dissected cephalad to the xiyphoid, and then the patient was placed in 20 degrees reflex position. The upper abdominal flap was advanced, progressive quilting with 2/0 vicryl sutures were performed in the epigastrium above the umbilicus. Then running progressive quilting sutures were performed laterally with 2-0 Stratafix suture, which was used later to close the campers' fascia and Madison's fascia of the inferior transverse incision, but before that, the umbilicus new location was marked, and the umbilicus was delivered and inset with 3-0 monocryl deep dermal suture followed by running subcuticular 3-0 monocryl. Then, the inferior transverse incision was closed as mentioned earlier, 0 PDS were added interruptedly for reenforcement, 3-0 PDS was then used to close the deep dermal layer.  And finally, a running 3-0 monocryl was then run in the subcuticular layer.  Incisional wound VAC was applied over the wound, setting 125 mmHG continuous.   The patient stood surgery well, was  successfully extubated and transferred to PACU in stable condition and viable flap.     Post-operative plan:  Patient to stay NPO overnight, IV fluids to maintain 1ml/kg/h urine output, CBC post surgery with blood transfusion if Hg is below 9. keep Parker for 3 days, 5000 units heparin SQ q8 changed to Lovenox on post-operative day 3 to 5, Aspirin 81 mg postoperative day 1 to 30, ERAS protocol, multi modal pain management by acute pain team, she is under strict bed rest for 24 hours. She is to be in beach chair positon to avoid tension on abdominal wound.  Avoid direct pressure on the flap. The wound will be dressed daily with strips of xeroform keeping the flap surface only covered loosely with ABD. Bear hugger will be used to keep patient warm (on medium setting for 5 hours discontinued for 1 hour for 3 days).  Flap check (color and turgor, temperature, and Doppler signal) will be checked q1 for the first 12 hours then q2 for the next 24 hours then q4 after wards,  location was marked with prolene suture. Monitor VAC and drains and record output. Antibiotics 24 hours.      Complications:  None; patient tolerated the procedure well.       Disposition: PACU - hemodynamically stable.     Condition: stable      Attending Attestation: I was present for the entire procedure and performed my part as described.     Crystal Gorman  Phone Number: [phone]

## 2023-11-24 ENCOUNTER — HOME CARE VISIT (OUTPATIENT)
Dept: HOME HEALTH SERVICES | Facility: HOME HEALTH | Age: 53
End: 2023-11-24
Payer: COMMERCIAL

## 2023-11-24 VITALS
TEMPERATURE: 97.6 F | OXYGEN SATURATION: 100 % | HEART RATE: 76 BPM | SYSTOLIC BLOOD PRESSURE: 110 MMHG | DIASTOLIC BLOOD PRESSURE: 70 MMHG

## 2023-11-24 PROCEDURE — G0151 HHCP-SERV OF PT,EA 15 MIN: HCPCS

## 2023-11-24 PROCEDURE — 0023 HH SOC

## 2023-11-24 ASSESSMENT — ACTIVITIES OF DAILY LIVING (ADL)
ENTERING_EXITING_HOME: NEEDS ASSISTANCE
OASIS_M1830: 03

## 2023-11-24 ASSESSMENT — ENCOUNTER SYMPTOMS
HIGHEST PAIN SEVERITY IN PAST 24 HOURS: 4/10
PERSON REPORTING PAIN: PATIENT
PAIN: 1
LOWEST PAIN SEVERITY IN PAST 24 HOURS: 1/10

## 2023-11-27 ENCOUNTER — OFFICE VISIT (OUTPATIENT)
Dept: PLASTIC SURGERY | Facility: CLINIC | Age: 53
End: 2023-11-27
Payer: COMMERCIAL

## 2023-11-27 VITALS
WEIGHT: 196 LBS | DIASTOLIC BLOOD PRESSURE: 75 MMHG | BODY MASS INDEX: 30.76 KG/M2 | HEIGHT: 67 IN | TEMPERATURE: 97.9 F | HEART RATE: 69 BPM | SYSTOLIC BLOOD PRESSURE: 110 MMHG

## 2023-11-27 DIAGNOSIS — Z98.890 STATUS POST BILATERAL BREAST RECONSTRUCTION: ICD-10-CM

## 2023-11-27 DIAGNOSIS — Z48.89 ENCOUNTER FOR POSTOPERATIVE WOUND CHECK: Primary | ICD-10-CM

## 2023-11-27 DIAGNOSIS — R20.2 NUMBNESS AND TINGLING OF LEFT THUMB: ICD-10-CM

## 2023-11-27 DIAGNOSIS — R20.0 NUMBNESS AND TINGLING OF LEFT THUMB: ICD-10-CM

## 2023-11-27 PROCEDURE — 1036F TOBACCO NON-USER: CPT

## 2023-11-27 PROCEDURE — 99024 POSTOP FOLLOW-UP VISIT: CPT

## 2023-11-27 ASSESSMENT — ENCOUNTER SYMPTOMS
LIGHT-HEADEDNESS: 0
NUMBNESS: 0

## 2023-11-27 ASSESSMENT — PAIN SCALES - GENERAL: PAINLEVEL: 0-NO PAIN

## 2023-11-27 NOTE — PATIENT INSTRUCTIONS
Important Contact Numbers  Division of Plastic Surgery      [phone]  Appointment Scheduling     Option #1  /Surgical Coordinator- Reshma Cortez  Option #3  Clinical Coordinator- Tyler BEEBE   Option #5  Financial Counselor-Insurance/Cosmetic Surgery- Mayra  [phone]  After Hours Answering Service     [phone]  Office Fax        [phone]  Radiology Scheduling       [phone]  Occupational Therapy Scheduling      [phone]  Referrals         [phone]  [email or web address]".org - please leave message with RN if e-mailing photos to this address.

## 2023-11-30 ASSESSMENT — ENCOUNTER SYMPTOMS
NUMBNESS: 0
CONSTITUTIONAL NEGATIVE: 1
GASTROINTESTINAL NEGATIVE: 1
WEAKNESS: 0
CARDIOVASCULAR NEGATIVE: 1
ENDOCRINE NEGATIVE: 1
EYES NEGATIVE: 1
PSYCHIATRIC NEGATIVE: 1
LIGHT-HEADEDNESS: 0
MUSCULOSKELETAL NEGATIVE: 1
RESPIRATORY NEGATIVE: 1
TREMORS: 0

## 2023-11-30 NOTE — PROGRESS NOTES
"Subjective   Post-operative visit  Cheyanne Rubio is a 53 y.o. female who is here for a post-operative check from surgery 11/10/23 Bilateral mastectomy with ALEKSANDR reconstruction.      Review of Systems  Review of Systems   Constitutional: Negative.    HENT: Negative.     Eyes: Negative.    Respiratory: Negative.     Cardiovascular: Negative.    Gastrointestinal: Negative.    Endocrine: Negative.    Genitourinary: Negative.    Musculoskeletal: Negative.    Skin: Negative.    Neurological:  Negative for tremors, weakness, light-headedness and numbness.        Continues to report that the left thumb and distal radial aspect of the forearm is \"sleepy\", poor co-ordination when typing resulting in multiple typos.    Psychiatric/Behavioral: Negative.         Objective   Physical Exam  Constitutional:       Appearance: Normal appearance.   HENT:      Nose: Nose normal.   Eyes:      Extraocular Movements: Extraocular movements intact.      Conjunctiva/sclera: Conjunctivae normal.      Pupils: Pupils are equal, round, and reactive to light.   Cardiovascular:      Rate and Rhythm: Normal rate and regular rhythm.      Pulses: Normal pulses.      Heart sounds: Normal heart sounds.   Pulmonary:      Effort: Pulmonary effort is normal.      Breath sounds: Normal breath sounds.   Abdominal:      General: Bowel sounds are normal. There is no distension.      Palpations: Abdomen is soft. There is no mass.      Tenderness: There is no abdominal tenderness. There is no guarding.      Hernia: No hernia is present.      Comments: Superficial sloughing of the skin at the umbilicus. Similar findings at the inferior incision with minimal erythema.    Musculoskeletal:         General: Tenderness present. No swelling, deformity or signs of injury. Normal range of motion.      Cervical back: Normal range of motion and neck supple.   Skin:     General: Skin is warm.      Findings: Erythema present. No rash.      Comments: Inferior mastectomy skin " "flaps are erythematous and edematous.  Umbilicus is dusky blue with signs of superficial ischemic change.   Neurological:      General: No focal deficit present.      Mental Status: She is alert and oriented to person, place, and time.      Cranial Nerves: No cranial nerve deficit.      Sensory: Sensory deficit present.      Motor: No weakness.      Coordination: Coordination normal.      Comments: Patient continues to experience \"sleepy\" left hand. On exam, no motor weakness,  light touch is affected compared to deep touch along the forearm and thumb, and temperature feeling is compromised at the thumb.    Psychiatric:         Mood and Affect: Mood normal.         Behavior: Behavior normal.         Thought Content: Thought content normal.         Judgment: Judgment normal.       Assessment/Plan :    I had the pleasure of seeing Ms. Rubio today. All her wounds have healed very well except for small superficial scabs at the abdominal incision, and partial sloughing of the umbilicus. There is residual edema and swelling, and I expect swelling to come down furthermore as her recovery progresses. Sensory injury of the superficial radial nerve/lateral antebrachial nerve continues to bother the patient, and I recommended EMG and ultrasound exam at the forearm and wrist. And, MRI can be considered if US in inconclusive.   We also discussed revision surgery to correct post-reconstruction asymmetry and deformity and excess skin/fat at the abdomen and breast. This will be an important step in her breast reconstruction, and will require a combination of reduction mammoplasty, liposuction, and fat grafting.   As for her healing, I discussed wound care, and I recommended that she continues with he same protocol.  I also recommended the abdominal binder for at least 6 more weeks.     Follow-up as ordered: To see her in December 28   "

## 2023-12-01 ENCOUNTER — HOME CARE VISIT (OUTPATIENT)
Dept: HOME HEALTH SERVICES | Facility: HOME HEALTH | Age: 53
End: 2023-12-01
Payer: COMMERCIAL

## 2023-12-07 ENCOUNTER — HOME CARE VISIT (OUTPATIENT)
Dept: HOME HEALTH SERVICES | Facility: HOME HEALTH | Age: 53
End: 2023-12-07
Payer: COMMERCIAL

## 2023-12-08 PROCEDURE — G0180 MD CERTIFICATION HHA PATIENT: HCPCS | Performed by: SURGERY

## 2023-12-11 ENCOUNTER — OFFICE VISIT (OUTPATIENT)
Dept: PLASTIC SURGERY | Facility: CLINIC | Age: 53
End: 2023-12-11
Payer: COMMERCIAL

## 2023-12-11 VITALS
DIASTOLIC BLOOD PRESSURE: 84 MMHG | RESPIRATION RATE: 16 BRPM | BODY MASS INDEX: 30.45 KG/M2 | HEIGHT: 67 IN | SYSTOLIC BLOOD PRESSURE: 119 MMHG | HEART RATE: 66 BPM | WEIGHT: 194 LBS

## 2023-12-11 DIAGNOSIS — Z98.890 STATUS POST BREAST RECONSTRUCTION: ICD-10-CM

## 2023-12-11 DIAGNOSIS — N65.1 BREAST ASYMMETRY FOLLOWING RECONSTRUCTIVE SURGERY: ICD-10-CM

## 2023-12-11 DIAGNOSIS — R20.0 NUMBNESS AND TINGLING OF LEFT THUMB: Primary | ICD-10-CM

## 2023-12-11 DIAGNOSIS — R20.2 NUMBNESS AND TINGLING OF LEFT THUMB: Primary | ICD-10-CM

## 2023-12-11 PROCEDURE — 1036F TOBACCO NON-USER: CPT

## 2023-12-11 PROCEDURE — 99024 POSTOP FOLLOW-UP VISIT: CPT

## 2023-12-11 RX ORDER — IBUPROFEN 600 MG/1
600 TABLET ORAL EVERY 6 HOURS PRN
Qty: 120 TABLET | Refills: 0 | Status: SHIPPED | OUTPATIENT
Start: 2023-12-11 | End: 2024-04-15

## 2023-12-11 RX ORDER — METHOCARBAMOL 750 MG/1
750 TABLET, FILM COATED ORAL 4 TIMES DAILY
Qty: 120 TABLET | Refills: 0 | Status: SHIPPED | OUTPATIENT
Start: 2023-12-11 | End: 2024-03-30 | Stop reason: HOSPADM

## 2023-12-11 ASSESSMENT — PAIN SCALES - GENERAL: PAINLEVEL: 0-NO PAIN

## 2023-12-13 ENCOUNTER — HOME CARE VISIT (OUTPATIENT)
Dept: HOME HEALTH SERVICES | Facility: HOME HEALTH | Age: 53
End: 2023-12-13
Payer: COMMERCIAL

## 2023-12-14 ASSESSMENT — ACTIVITIES OF DAILY LIVING (ADL)
HOME_HEALTH_OASIS: 00
OASIS_M1830: 00

## 2023-12-20 ENCOUNTER — APPOINTMENT (OUTPATIENT)
Dept: NEUROLOGY | Facility: HOSPITAL | Age: 53
End: 2023-12-20
Payer: COMMERCIAL

## 2023-12-20 ASSESSMENT — ENCOUNTER SYMPTOMS
LIGHT-HEADEDNESS: 0
RESPIRATORY NEGATIVE: 1
PSYCHIATRIC NEGATIVE: 1
MUSCULOSKELETAL NEGATIVE: 1
TREMORS: 0
GASTROINTESTINAL NEGATIVE: 1
EYES NEGATIVE: 1
CONSTITUTIONAL NEGATIVE: 1
WEAKNESS: 0
NUMBNESS: 0
ENDOCRINE NEGATIVE: 1
CARDIOVASCULAR NEGATIVE: 1

## 2023-12-20 NOTE — PROGRESS NOTES
"Subjective   Post-operative visit  Cheyanne Rubio is a 53 y.o. female who is here for a post-operative check from surgery 11/10/23 Bilateral mastectomy for pathogenic PALB2 gene mutation with immediate TRAM reconstruction and reconstruction of abdominal with mesh.  Overall, patient is doing well. She is walking daily and utilizing ankle weights. Reports that since surgery - left arm strength weakness with lifting objects i.e. lifting up grocery bags or carrying objects and sensory deficits (numbness) distal to antecubital fossa along distal radial aspect of forearm through thumb.     Pt had bilateral JACE block by Acute Pain Service preoperatively.     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     Review of Systems  Review of Systems   Constitutional: Negative.    HENT: Negative.     Eyes: Negative.    Respiratory: Negative.     Cardiovascular: Negative.    Gastrointestinal: Negative.    Endocrine: Negative.    Genitourinary: Negative.    Musculoskeletal: Negative.    Skin: Negative.    Neurological:  Negative for tremors, weakness, light-headedness and numbness.        Continues to report that the left thumb and distal radial aspect of the forearm is \"sleepy\", poor co-ordination when typing resulting in multiple typos.    Psychiatric/Behavioral: Negative.         Objective   Physical Exam     Constitutional- no acute distress  Cards- regular rate   Resp- nonlabored breathing on room air   Breasts - lateral pole fullness bilaterally; L breast with fullness in superior pole as well;   Abdomen- soft, not tender, not distended; dog ears along lateral aspect of incision bilaterally; incisions without signs of infection; umbilicus with skin dehiscence without fascial dehiscence and without signs of infection   Extremities- ambulating easily  Skin- warm, dry   Neuro- alert and oriented x3; L biceps 5/5 strength but weaker than R biceps; numbness along radial aspect of " forearm through thumb  Psych- appropriate mood   Tubes/lines- none     Assessment/Plan :    Pt is a 52yo F who underwent bilateral mastectomy, for pathogenic PALP2 gene mutation, and immediate TRAM reconstruction and reconstruction of abdominal with mesh 11/10/23. She presents today for 6 week follow-up. Overall doing well, but having sensory and motor deficits in LUE along radial aspect of forearm through thumb. EMG showing isolated musculocutaneous nerve neuropathy without transection. Her deficits interfere with her cooking/lifting objects/tennis playing.     PLAN  - discuss neuropathy with Acute Pain Service - Dr. Reyes (Acute Pain Service Attending)  - discuss neuromuscular consult with Dr. Nicola Landaverde (Neuromuscular Attending)   - possible MRI to further delineate anatomy and assess nature of nerve injury   - plan for mammoplasty revision of breast reconstruction and liposuction and fat grafting in late February with possible distal nerve transfer if indicated   - assess nerve regeneration   - Wound care: Aquacel over abdominal incision and change dressing every 3-4 days if fully saturated; no need to pack umbilicus, can cover with ABD and let heal by secondary intent  - second trial of gabapentin 300mg BID for burning/nerve pain     Discussed plan extensively with patient.     Seen and discussed with Attending, Dr. Gorman.     Himanshu Phillips MD   Plastic Surgery   Awake/Alert

## 2023-12-27 ENCOUNTER — HOSPITAL ENCOUNTER (OUTPATIENT)
Dept: NEUROLOGY | Facility: HOSPITAL | Age: 53
Discharge: HOME | End: 2023-12-27
Payer: COMMERCIAL

## 2023-12-27 DIAGNOSIS — R20.2 NUMBNESS AND TINGLING OF LEFT THUMB: ICD-10-CM

## 2023-12-27 DIAGNOSIS — R20.0 NUMBNESS AND TINGLING OF LEFT THUMB: ICD-10-CM

## 2023-12-27 DIAGNOSIS — Z98.890 STATUS POST BREAST RECONSTRUCTION: ICD-10-CM

## 2023-12-27 DIAGNOSIS — N65.1 BREAST ASYMMETRY FOLLOWING RECONSTRUCTIVE SURGERY: ICD-10-CM

## 2023-12-27 PROCEDURE — 95911 NRV CNDJ TEST 9-10 STUDIES: CPT | Performed by: STUDENT IN AN ORGANIZED HEALTH CARE EDUCATION/TRAINING PROGRAM

## 2023-12-27 PROCEDURE — 95886 MUSC TEST DONE W/N TEST COMP: CPT | Mod: GC | Performed by: STUDENT IN AN ORGANIZED HEALTH CARE EDUCATION/TRAINING PROGRAM

## 2023-12-27 PROCEDURE — 95886 MUSC TEST DONE W/N TEST COMP: CPT | Performed by: STUDENT IN AN ORGANIZED HEALTH CARE EDUCATION/TRAINING PROGRAM

## 2023-12-27 PROCEDURE — 95911 NRV CNDJ TEST 9-10 STUDIES: CPT | Mod: GC | Performed by: STUDENT IN AN ORGANIZED HEALTH CARE EDUCATION/TRAINING PROGRAM

## 2023-12-28 ENCOUNTER — OFFICE VISIT (OUTPATIENT)
Dept: PLASTIC SURGERY | Facility: CLINIC | Age: 53
End: 2023-12-28
Payer: COMMERCIAL

## 2023-12-28 VITALS
BODY MASS INDEX: 29.25 KG/M2 | WEIGHT: 193 LBS | SYSTOLIC BLOOD PRESSURE: 123 MMHG | DIASTOLIC BLOOD PRESSURE: 85 MMHG | HEART RATE: 73 BPM | RESPIRATION RATE: 16 BRPM | HEIGHT: 68 IN

## 2023-12-28 DIAGNOSIS — Z48.89 ENCOUNTER FOR POSTOPERATIVE WOUND CHECK: ICD-10-CM

## 2023-12-28 DIAGNOSIS — M79.2 NEUROPATHIC PAIN: Primary | ICD-10-CM

## 2023-12-28 PROCEDURE — 1036F TOBACCO NON-USER: CPT

## 2023-12-28 PROCEDURE — 99024 POSTOP FOLLOW-UP VISIT: CPT

## 2023-12-28 RX ORDER — GABAPENTIN 300 MG/1
300 CAPSULE ORAL 2 TIMES DAILY
Qty: 60 CAPSULE | Refills: 1 | Status: SHIPPED | OUTPATIENT
Start: 2023-12-28 | End: 2024-03-30 | Stop reason: HOSPADM

## 2024-01-18 ASSESSMENT — ENCOUNTER SYMPTOMS
CARDIOVASCULAR NEGATIVE: 1
GASTROINTESTINAL NEGATIVE: 1
LIGHT-HEADEDNESS: 0
TREMORS: 0
NUMBNESS: 0
MUSCULOSKELETAL NEGATIVE: 1
EYES NEGATIVE: 1
RESPIRATORY NEGATIVE: 1
CONSTITUTIONAL NEGATIVE: 1
ENDOCRINE NEGATIVE: 1
WEAKNESS: 0
PSYCHIATRIC NEGATIVE: 1

## 2024-01-18 NOTE — PROGRESS NOTES
"Subjective     Post-operative visit  Cheyanne Rubio is a 53 y.o. female who is here for a post-operative check. On 11/10/23 she underwent bilateral mastectomy for pathogenic PALB2 gene mutation with immediate TRAM reconstruction and reconstruction of abdominal wall with mesh. Overall, patient is doing well. She is walking daily and utilizing ankle weights. Reports that since surgery she has had LUE weakness when lifting grocery bags or carrying objects. She also endorses numbness distal to antecubital fossa along distal radial aspect of forearm through thumb. Reports this weakness and numbness have improved somewhat but are still limiting her ability to exercise. 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     Review of Systems  Review of Systems   Constitutional: Negative.    HENT: Negative.     Eyes: Negative.    Respiratory: Negative.     Cardiovascular: Negative.    Gastrointestinal: Negative.    Endocrine: Negative.    Genitourinary: Negative.    Musculoskeletal: Negative.    Skin: Negative.    Neurological:  Negative for tremors, weakness, light-headedness and numbness.        Continues to report that the left thumb and distal radial aspect of the forearm is \"sleepy\", poor co-ordination when typing resulting in multiple typos.    Psychiatric/Behavioral: Negative.         Objective   Vitals:    01/29/24 0904   BP: 115/81   Pulse: 68   Temp: 36.6 °C (97.8 °F)   SpO2: 96%      Physical Exam  Constitutional: no acute distress  CV: regular rate   Resp: nonlabored breathing on room air   Breast: lateral pole fullness bilaterally; L breast with fullness in superior pole as well; bilateral dog ears present: 5cm x 10cm lateral chest wall right breast and 5cm x 9cm lateral chest wall left breast  Abdomen: soft, nontender, nondistended; dog ears along lateral aspect of lower abdominal transverse incision bilaterally, both measuring 3cm x 9cm; " incisions without signs of infection; umbilicus with skin dehiscence without fascial dehiscence and without signs of infection  MSK: ALVARADO, LUE abduction slightly limited 2/2 pain/discomfort  Skin: warm, dry   Neuro: alert and oriented x3; L biceps strength 4/5; numbness along radial aspect of forearm through thumb  Psych: appropriate mood and behavior      Assessment/Plan :  Pt is a 54yo F who underwent bilateral mastectomy for pathogenic PALP2 gene mutation with immediate TRAM reconstruction and reconstruction of abdominal wall with mesh on 11/10/23. She presents today for follow up. Overall doing well but still reports sensory and motor deficits in LUE along radial aspect of forearm through thumb. 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 Her deficits interfere with her cooking/lifting objects/tennis playing.     Plan:    - Patient to see Dr. Nicola Landaverde (Neuromuscular Attending) on 2/9/24 for neuromuscular consult. Per Dr. Landaverde will likely defer MRI at this time.  - Plan for revision of breast reconstruction with reduction of the flaps either in wise pattern reduction or circular pattern to also target the upper poles. Additionally,  liposuction at both ends of the transverse abdominal incision, lateral chest walls and axillary folds, and excision of excess skin at the abdomen at the ends of the inferior transverse incision.   - Will also plan for likely bilateral brachioplasty at the time of reconstruction, but this will require quota.   - Patient will continue taking nightly gabapentin and TID robaxin for postop pain and neuropathic pain; refills sent today     Patient seen and plan discussed with Dr. Nancy MD  General Surgery, PGY-2  PRS

## 2024-01-29 ENCOUNTER — OFFICE VISIT (OUTPATIENT)
Dept: PLASTIC SURGERY | Facility: CLINIC | Age: 54
End: 2024-01-29
Payer: COMMERCIAL

## 2024-01-29 ENCOUNTER — PATIENT MESSAGE (OUTPATIENT)
Dept: PLASTIC SURGERY | Facility: CLINIC | Age: 54
End: 2024-01-29

## 2024-01-29 VITALS
SYSTOLIC BLOOD PRESSURE: 115 MMHG | WEIGHT: 194 LBS | OXYGEN SATURATION: 96 % | TEMPERATURE: 97.8 F | BODY MASS INDEX: 29.85 KG/M2 | DIASTOLIC BLOOD PRESSURE: 81 MMHG | HEART RATE: 68 BPM

## 2024-01-29 DIAGNOSIS — M79.2 NEUROPATHIC PAIN: ICD-10-CM

## 2024-01-29 DIAGNOSIS — Z98.890 STATUS POST BREAST RECONSTRUCTION: ICD-10-CM

## 2024-01-29 DIAGNOSIS — N65.0 DEFORMITY AND DISPROPORTION OF RECONSTRUCTED BREAST: Primary | ICD-10-CM

## 2024-01-29 DIAGNOSIS — L98.7 EXCESS SKIN: ICD-10-CM

## 2024-01-29 DIAGNOSIS — G89.18 PAIN ASSOCIATED WITH SURGICAL PROCEDURE: Primary | ICD-10-CM

## 2024-01-29 DIAGNOSIS — N65.1 DEFORMITY AND DISPROPORTION OF RECONSTRUCTED BREAST: Primary | ICD-10-CM

## 2024-01-29 PROCEDURE — 1036F TOBACCO NON-USER: CPT

## 2024-01-29 PROCEDURE — 99024 POSTOP FOLLOW-UP VISIT: CPT

## 2024-01-29 RX ORDER — IBUPROFEN 200 MG
600 TABLET ORAL EVERY 6 HOURS PRN
Qty: 60 TABLET | Refills: 0 | Status: SHIPPED | OUTPATIENT
Start: 2024-01-29 | End: 2024-01-31 | Stop reason: SDUPTHER

## 2024-01-29 RX ORDER — GABAPENTIN 300 MG/1
300 CAPSULE ORAL NIGHTLY
Qty: 30 CAPSULE | Refills: 0 | Status: SHIPPED | OUTPATIENT
Start: 2024-01-29 | End: 2024-03-22 | Stop reason: WASHOUT

## 2024-01-29 RX ORDER — METHOCARBAMOL 750 MG/1
750 TABLET, FILM COATED ORAL 3 TIMES DAILY
Qty: 90 TABLET | Refills: 0 | Status: SHIPPED | OUTPATIENT
Start: 2024-01-29 | End: 2024-03-30 | Stop reason: HOSPADM

## 2024-01-29 ASSESSMENT — PAIN SCALES - GENERAL: PAINLEVEL: 0-NO PAIN

## 2024-01-31 DIAGNOSIS — G89.18 PAIN ASSOCIATED WITH SURGICAL PROCEDURE: ICD-10-CM

## 2024-01-31 RX ORDER — IBUPROFEN 200 MG
600 TABLET ORAL EVERY 6 HOURS PRN
Qty: 60 TABLET | Refills: 0 | Status: SHIPPED | OUTPATIENT
Start: 2024-01-31 | End: 2024-03-01

## 2024-02-11 DIAGNOSIS — N65.0 DEFORMITY AND DISPROPORTION OF RECONSTRUCTED BREAST: Primary | ICD-10-CM

## 2024-02-11 DIAGNOSIS — L98.7 EXCESS SKIN OF BREAST: ICD-10-CM

## 2024-02-11 DIAGNOSIS — L98.7 EXCESS SKIN OF ABDOMEN: ICD-10-CM

## 2024-02-11 DIAGNOSIS — N65.1 DEFORMITY AND DISPROPORTION OF RECONSTRUCTED BREAST: Primary | ICD-10-CM

## 2024-02-11 DIAGNOSIS — E66.9 EXCESSIVE SUBCUTANEOUS FAT: ICD-10-CM

## 2024-02-11 DIAGNOSIS — Z98.890 STATUS POST BILATERAL BREAST RECONSTRUCTION: ICD-10-CM

## 2024-02-20 PROBLEM — E66.9 EXCESSIVE SUBCUTANEOUS FAT: Status: ACTIVE | Noted: 2024-02-11

## 2024-02-20 PROBLEM — L98.7 EXCESS SKIN OF BREAST: Status: ACTIVE | Noted: 2024-02-11

## 2024-02-20 PROBLEM — L98.7 EXCESS SKIN OF ABDOMEN: Status: ACTIVE | Noted: 2024-02-11

## 2024-03-13 NOTE — PROGRESS NOTES
Subjective :  Patient ID: Cheyanne Rubio is a 53 y.o. female.    History of Present Illness: On 11/10/23 she underwent bilateral mastectomy for pathogenic PALB2 gene mutation with immediate TRAM reconstruction and reconstruction of abdominal wall with mesh. Patient presents today for pre-operative discussion prior to revision surgery and bilateral brachioplasty on 3/27/24.     Patient is doing well, she is seeing a rehab specialist later this week to work on her mild back pain and to work on her core strength.     Objective :  Physical Exam    Assessment/Plan :      -Patient desires a small C or B size   -Post operative drains, wound vac and upper extremity restrictions discussed for 4 weeks post-operatively  -Abdominal compression garment suggested for 4-6 weeks

## 2024-03-14 ENCOUNTER — OFFICE VISIT (OUTPATIENT)
Dept: SURGICAL ONCOLOGY | Facility: CLINIC | Age: 54
End: 2024-03-14
Payer: COMMERCIAL

## 2024-03-14 ENCOUNTER — LAB (OUTPATIENT)
Dept: LAB | Facility: LAB | Age: 54
End: 2024-03-14
Payer: COMMERCIAL

## 2024-03-14 VITALS
OXYGEN SATURATION: 98 % | HEART RATE: 68 BPM | DIASTOLIC BLOOD PRESSURE: 79 MMHG | TEMPERATURE: 97.9 F | SYSTOLIC BLOOD PRESSURE: 111 MMHG | BODY MASS INDEX: 29.8 KG/M2 | WEIGHT: 193.67 LBS

## 2024-03-14 DIAGNOSIS — Z98.890 STATUS POST BREAST RECONSTRUCTION: ICD-10-CM

## 2024-03-14 DIAGNOSIS — N93.9 ABNORMAL UTERINE BLEEDING (AUB): ICD-10-CM

## 2024-03-14 DIAGNOSIS — Z12.39 ENCOUNTER FOR BREAST CANCER SCREENING USING NON-MAMMOGRAM MODALITY: ICD-10-CM

## 2024-03-14 DIAGNOSIS — L98.7 EXCESS SKIN OF BREAST: ICD-10-CM

## 2024-03-14 DIAGNOSIS — N94.6 DYSMENORRHEA: ICD-10-CM

## 2024-03-14 DIAGNOSIS — L98.7 EXCESS SKIN OF ABDOMEN: ICD-10-CM

## 2024-03-14 DIAGNOSIS — Z98.890 STATUS POST BILATERAL BREAST RECONSTRUCTION: ICD-10-CM

## 2024-03-14 DIAGNOSIS — Z15.09 MONOALLELIC MUTATION OF PALB2 GENE: Primary | ICD-10-CM

## 2024-03-14 DIAGNOSIS — Z80.3 FAMILY HISTORY OF BREAST CANCER: ICD-10-CM

## 2024-03-14 DIAGNOSIS — E66.9 EXCESSIVE SUBCUTANEOUS FAT: ICD-10-CM

## 2024-03-14 DIAGNOSIS — Z90.13 STATUS POST BILATERAL MASTECTOMY: ICD-10-CM

## 2024-03-14 DIAGNOSIS — Z15.89 MONOALLELIC MUTATION OF PALB2 GENE: Primary | ICD-10-CM

## 2024-03-14 DIAGNOSIS — N65.0 DEFORMITY AND DISPROPORTION OF RECONSTRUCTED BREAST: ICD-10-CM

## 2024-03-14 DIAGNOSIS — Z15.01 MONOALLELIC MUTATION OF PALB2 GENE: Primary | ICD-10-CM

## 2024-03-14 DIAGNOSIS — N65.1 DEFORMITY AND DISPROPORTION OF RECONSTRUCTED BREAST: ICD-10-CM

## 2024-03-14 DIAGNOSIS — M54.9 BACK PAIN, UNSPECIFIED BACK LOCATION, UNSPECIFIED BACK PAIN LATERALITY, UNSPECIFIED CHRONICITY: ICD-10-CM

## 2024-03-14 LAB
ANION GAP SERPL CALC-SCNC: 13 MMOL/L (ref 10–20)
BUN SERPL-MCNC: 18 MG/DL (ref 6–23)
CALCIUM SERPL-MCNC: 10 MG/DL (ref 8.6–10.3)
CHLORIDE SERPL-SCNC: 101 MMOL/L (ref 98–107)
CO2 SERPL-SCNC: 30 MMOL/L (ref 21–32)
CREAT SERPL-MCNC: 0.85 MG/DL (ref 0.5–1.05)
EGFRCR SERPLBLD CKD-EPI 2021: 83 ML/MIN/1.73M*2
ERYTHROCYTE [DISTWIDTH] IN BLOOD BY AUTOMATED COUNT: 13.2 % (ref 11.5–14.5)
GLUCOSE SERPL-MCNC: 86 MG/DL (ref 74–99)
HCT VFR BLD AUTO: 40.3 % (ref 36–46)
HGB BLD-MCNC: 13.4 G/DL (ref 12–16)
MCH RBC QN AUTO: 30.2 PG (ref 26–34)
MCHC RBC AUTO-ENTMCNC: 33.3 G/DL (ref 32–36)
MCV RBC AUTO: 91 FL (ref 80–100)
NRBC BLD-RTO: 0 /100 WBCS (ref 0–0)
PLATELET # BLD AUTO: 275 X10*3/UL (ref 150–450)
POTASSIUM SERPL-SCNC: 4.4 MMOL/L (ref 3.5–5.3)
RBC # BLD AUTO: 4.44 X10*6/UL (ref 4–5.2)
SODIUM SERPL-SCNC: 140 MMOL/L (ref 136–145)
WBC # BLD AUTO: 8.2 X10*3/UL (ref 4.4–11.3)

## 2024-03-14 PROCEDURE — 86900 BLOOD TYPING SEROLOGIC ABO: CPT

## 2024-03-14 PROCEDURE — 86901 BLOOD TYPING SEROLOGIC RH(D): CPT

## 2024-03-14 PROCEDURE — 86850 RBC ANTIBODY SCREEN: CPT

## 2024-03-14 PROCEDURE — 36415 COLL VENOUS BLD VENIPUNCTURE: CPT

## 2024-03-14 PROCEDURE — 99214 OFFICE O/P EST MOD 30 MIN: CPT | Performed by: SURGERY

## 2024-03-14 PROCEDURE — 1036F TOBACCO NON-USER: CPT | Performed by: SURGERY

## 2024-03-14 PROCEDURE — 80048 BASIC METABOLIC PNL TOTAL CA: CPT

## 2024-03-14 PROCEDURE — 85027 COMPLETE CBC AUTOMATED: CPT

## 2024-03-14 PROCEDURE — 86901 BLOOD TYPING SEROLOGIC RH(D): CPT | Mod: OUT

## 2024-03-14 ASSESSMENT — ENCOUNTER SYMPTOMS
NEUROLOGICAL NEGATIVE: 1
ALLERGIC/IMMUNOLOGIC NEGATIVE: 1
PSYCHIATRIC NEGATIVE: 1
GASTROINTESTINAL NEGATIVE: 1
CARDIOVASCULAR NEGATIVE: 1
HEMATOLOGIC/LYMPHATIC NEGATIVE: 1
RESPIRATORY NEGATIVE: 1
EYES NEGATIVE: 1
BACK PAIN: 1
CONSTITUTIONAL NEGATIVE: 1
ROS SKIN COMMENTS: LEFT CHEST WALL PAIN
ENDOCRINE NEGATIVE: 1

## 2024-03-14 ASSESSMENT — PAIN SCALES - GENERAL: PAINLEVEL: 7

## 2024-03-14 NOTE — PROGRESS NOTES
Subjective     HPI  Cheyanne Rubio is a very pleasant 53 year old  female with a PALB2 mutation and family history of breast, ovarian, pancreatic, and prostate cancer s/p bilateral prophylactic mastectomy 11/10/23 accompanied by immediate bilateral TRAM flap reconstruction, and reconstruction of abdominal wall with mesh (Dr. Gorman). Final pathology showed pseudoangiomatous stromal hyperplasia and no evidence of carcinoma or atypia bilaterally.      On 2022 bilateral diagnostic mammogram and left breast ultrasound showed scattered fibroglandular breast tissue. The left breast ultrasound showed normal breast parenchyma at the area of palpable abnormality, BI-RADS Category 1.     On 23, bilateral breast MRI showed no evidence of malignancy in either breast. BI-RADS Category: 2     She presents for breast cancer screening visit. Post-op course was significant for severe hematoma/bruising and slightly prolonged recovery. Has since returned to most daily activities and a modified exercise routine. Has returned to travel for work. She notes pain and tenderness of the left lateral chest wall and bilateral lower back for approximately 1 month without specific exacerbating or alleviating factors. She is planning revision of breast reconstruction with Dr. Gorman in late 2024 to reduce size and modify shape. She is still interested in prophylactic BSO.       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      FAMILY HISTORY: Mother with pancreatic cancer diagnosed at 75  at 75 in 2023  Father  of pancreatic cancer diagnosed at 72  at 72  Paternal aunt with breast and pancreatic cancer diagnosed at 50  at 65  Paternal aunt with breast and pancreatic  cancer diagnosed at 50  at 65  Paternal uncle with prostate and pancreatic cancer diagnosed at 70  at 70  Paternal cousin diagnosed with ovarian cancer at 48  at 50    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    Monoallelic mutation of PALB2 gene     Other ovarian dysfunction 10/24/2014    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    Pain in unspecified joint 2018    Arthralgia of multiple sites    Personal history of other diseases of the female genital tract     Personal history of endometriosis    Personal history of other diseases of the female genital tract     Personal history of female infertility    Polyp of corpus uteri     Uterine polyp    Stricture and stenosis of cervix uteri     Cervical stenosis (uterine cervix)    Thyroid nodule     Vitamin D deficiency, unspecified 2018    Vitamin D deficiency       Past Surgical History:   Procedure Laterality Date    CT ABDOMEN PELVIS ANGIOGRAM W AND/OR WO IV CONTRAST  2023    CT ABDOMEN PELVIS ANGIOGRAM W AND/OR WO IV CONTRAST 2023 [facility] CT    DILATION AND CURETTAGE OF UTERUS  10/20/2014    Dilation And Curettage    EXPLORATORY LAPAROTOMY      HYSTEROSCOPY      LAPAROSCOPY DIAGNOSTIC / BIOPSY / ASPIRATION / LYSIS  10/20/2014    Exploratory Laparoscopy    OTHER SURGICAL HISTORY  10/20/2014    Hysteroscopy       Current  Outpatient Medications on File Prior to Visit   Medication Sig Dispense Refill    brimonidine 0.025 % drops Administer 1 drop into affected eye(s) once daily.      calcium citrate-vitamin D3 500 mg-12.5 mcg (500 unit) tablet,chewable Chew 1 tablet once daily.      fexofenadine (Allegra) 180 mg tablet Take 1 tablet (180 mg) by mouth once daily.      methocarbamol (Robaxin-750) 750 mg tablet Take 1 tablet (750 mg) by mouth 4 times a day. 120 tablet 0    gabapentin (Neurontin) 300 mg capsule Take 1 capsule (300 mg) by mouth 2 times a day. 60 capsule 1    gabapentin (Neurontin) 300 mg capsule Take 1 capsule (300 mg) by mouth once daily at bedtime. 30 capsule 0    methocarbamol (Robaxin-750) 750 mg tablet Take 1 tablet (750 mg) by mouth 4 times a day for 10 days. 40 tablet 0    methocarbamol (Robaxin-750) 750 mg tablet Take 1 tablet (750 mg) by mouth 3 times a day. 90 tablet 0    psyllium (Metamucil) 0.4 gram capsule Take 1 capsule by mouth once daily.       No current facility-administered medications on file prior to visit.         Review of Systems   Constitutional: Negative.    HENT: Negative.     Eyes: Negative.    Respiratory: Negative.     Cardiovascular: Negative.    Gastrointestinal: Negative.    Endocrine: Negative.    Genitourinary: Negative.    Musculoskeletal:  Positive for back pain.   Skin:         Left chest wall pain    Allergic/Immunologic: Negative.    Neurological: Negative.    Hematological: Negative.    Psychiatric/Behavioral: Negative.     All other systems reviewed and are negative.      Objective   Physical Exam  General: Otherwise healthy appearing. No acute distress.     HEENT: Conjunctiva well-colored, sclera non-icteric. Neck supple, trachea midline. No lymphadenopathy or thyromegaly.     Cardiovascular: Regular rate and rhythm.    Respiratory: Clear to auscultation bilaterally.     Breast: Exam performed in the sitting and supine positions. Bilateral well-healed transverse mastectomy  incisions with TRAM skin paddles and autologous reconstruction. Left lateral chest wall tenderness to palpation, 7 cm inferior to axilla at mid axillary line.    Back: tenderness at lower paraspinal area, left greater than right.      Abdomen: Soft, non-tender, non-distended.    Extremities: Atraumatic, no edema.     Neurologic: Motor and sensory grossly intact. Alert and oriented.     Lymphatic: No axillary, supraclavicular, or infraclavicular lymphadenopathy bilaterally.       Assessment/Plan   Today we reviewed your pertinent history, physical exam, imaging, and pathology. Your clinical exam today shows no worrisome findings. The left chest wall and back pain both seem musculoskeletal in nature. I recommend referral to Dr. Cardona for further evaluation.     All questions were answered in detail, and we are in agreement with the following plan:     1. Please continue to see Dr. Gorman in plastics.   2. Referral to gynecologic oncology for PALB2 mutation.   3. Referral to Dr. Madison for likely musculoskeletal pain.   4. Please return for an office visit and exam with me September 2024.     If you have any questions, concerns, or changes in your breast self-exam prior to our next visit, please call my office at the number below. I look forward to seeing you again soon.     Lynda Barahona MD   Breast Surgical Oncology Department of Surgery   [facility]   Office: [phone] (option #2)   Fax: [phone]     DISCLAIMER: Speech recognition software was used to create portions of this document. While an attempt at proofreading has been made, minor errors in transcription may be present.    Diagnoses and all orders for this visit:  Monoallelic mutation of PALB2 gene  Encounter for breast cancer screening using non-mammogram modality  Family history of breast cancer  Status post bilateral mastectomy  Status post breast reconstruction  Status post bilateral breast  reconstruction  -     Referral to Gynecologic Oncology; Future  -     Referral to Physical Medicine Rehab; Future  Abnormal uterine bleeding (AUB)  -     Referral to Gynecologic Oncology; Future  -     Referral to Physical Medicine Rehab; Future  Back pain, unspecified back location, unspecified back pain laterality, unspecified chronicity  -     Referral to Physical Medicine Rehab; Future  Dysmenorrhea      Scribe Attestation  By signing my name below, I, Lynda Barahona MD, Scribe   attest that this documentation has been prepared under the direction and in the presence of Lynda Barahona MD.

## 2024-03-15 ENCOUNTER — LAB REQUISITION (OUTPATIENT)
Dept: LAB | Facility: HOSPITAL | Age: 54
End: 2024-03-15
Payer: COMMERCIAL

## 2024-03-15 DIAGNOSIS — N65.1 DISPROPORTION OF RECONSTRUCTED BREAST: ICD-10-CM

## 2024-03-15 DIAGNOSIS — E66.9 OBESITY, UNSPECIFIED: ICD-10-CM

## 2024-03-15 DIAGNOSIS — L98.7 EXCESSIVE AND REDUNDANT SKIN AND SUBCUTANEOUS TISSUE: ICD-10-CM

## 2024-03-15 DIAGNOSIS — N65.0 DEFORMITY OF RECONSTRUCTED BREAST: ICD-10-CM

## 2024-03-15 DIAGNOSIS — Z98.890 OTHER SPECIFIED POSTPROCEDURAL STATES: ICD-10-CM

## 2024-03-15 LAB
ABO GROUP (TYPE) IN BLOOD: NORMAL
ANTIBODY SCREEN: NORMAL
RH FACTOR (ANTIGEN D): NORMAL

## 2024-03-20 ENCOUNTER — OFFICE VISIT (OUTPATIENT)
Dept: PLASTIC SURGERY | Facility: CLINIC | Age: 54
End: 2024-03-20
Payer: COMMERCIAL

## 2024-03-20 VITALS
BODY MASS INDEX: 29.25 KG/M2 | HEIGHT: 68 IN | HEART RATE: 83 BPM | RESPIRATION RATE: 16 BRPM | DIASTOLIC BLOOD PRESSURE: 63 MMHG | SYSTOLIC BLOOD PRESSURE: 114 MMHG | WEIGHT: 193 LBS

## 2024-03-20 DIAGNOSIS — L98.7 EXCESSIVE SKIN AND SUBCUTANEOUS TISSUE: ICD-10-CM

## 2024-03-20 DIAGNOSIS — Z98.890 S/P BREAST RECONSTRUCTION: Primary | ICD-10-CM

## 2024-03-20 DIAGNOSIS — N65.1 BREAST ASYMMETRY FOLLOWING RECONSTRUCTIVE SURGERY: ICD-10-CM

## 2024-03-20 PROCEDURE — 1036F TOBACCO NON-USER: CPT

## 2024-03-20 PROCEDURE — 99213 OFFICE O/P EST LOW 20 MIN: CPT

## 2024-03-20 ASSESSMENT — PAIN SCALES - GENERAL: PAINLEVEL: 2

## 2024-03-20 NOTE — PROGRESS NOTES
Subjective :  Patient ID: Cheyanne Rubio is a 53 y.o. female.    History of Present Illness: On 11/10/23 she underwent bilateral mastectomy for pathogenic PALB2 gene mutation with immediate TRAM reconstruction and reconstruction of abdominal wall with mesh. Patient presents today for pre-operative discussion prior to revision surgery and bilateral brachioplasty on 3/27/24.     Patient is doing well, she is seeing a rehab specialist later this week to work on her mild back pain and to work on her core strength.     Objective :  Physical Exam   Constitutional  She appears well-developed.   Cardiovascular: Normal rate and regular rhythm.     Pulmonary/Chest  Effort normal.     Skin  Skin is warm and dry.     Psychiatric  She has a normal mood and affect. Her behavior is normal. Judgment and thought content normal.     Extremities  Arms: She is positive for redundant arm skin and bat wings.         Face  Her facial expression is fully symmetric. She has facial weakness in the following areas: none on the right, and none on the left. She has normal soft tissue contour.     Breast  Her breasts are asymmetrical. Her right breast is larger than left. Her breasts have colors within defined limits. Her breasts have normal envelope compliance.   Additional breast conditions include the following:   Right Breast: Her right breast has a scar.   Left Breast: Her left breast has a scar.     Notable softness of the neobreasts, increased fullness of the upper pole of the left breast. Excessive skin and fat at the lateral breast/chest.   Excessive fatty tissue at both ends of the inferior transverse abdominal scar.   Grade II excessive skin and fatty tissue at both arms.     Signs of MS nerve recovery.    Assessment/Plan :    Patient presented today for pre-operative discussion. She is scheduled for surgery 3/27/24. Patient is scheduled to have bilateral bracheoplasty, bilateral breast revision with lateral chest liposuction and  suction-assisted lipectomy and skin excision of the lower abdomen. Patient would ideally like to be a small C or large B post breast revision. I drew on the patient leopoldo with her permission to determine the extent of excision, and the direction of incisions. Lateral and inferolateral excision would yield good results in terms of volume reduction, the lateral ski flap will benefit from advancement and excision at the midline.    For arm lifts, I discussed the extent of the scar, I feel relaxed J will be feasible. Liposuction will be utilized for the excessive fat posteriorly but not the deltoid.   I discussed the need for incisional wounds VAC, and drains. We discussed potential complications of the procedures. The patient fully understands what the procedures entail and what to expect post-operatively and throughout recovery. We discussed overnight stay, nerve blocks, and upper extremity restrictions for 4 weeks. Patient should also wear an abdominal compression garment for 4-6 weeks post operatively. The patient fully understands the procedures, recovery time and would like to proceed with surgery. All questions were answered fully and she can reach out with any concerns or questions.

## 2024-03-22 ENCOUNTER — CONSULT (OUTPATIENT)
Dept: PHYSICAL MEDICINE AND REHAB | Facility: CLINIC | Age: 54
End: 2024-03-22
Payer: COMMERCIAL

## 2024-03-22 VITALS — DIASTOLIC BLOOD PRESSURE: 62 MMHG | HEART RATE: 57 BPM | SYSTOLIC BLOOD PRESSURE: 116 MMHG | RESPIRATION RATE: 18 BRPM

## 2024-03-22 DIAGNOSIS — M54.9 CHRONIC MIDLINE BACK PAIN, UNSPECIFIED BACK LOCATION: Primary | ICD-10-CM

## 2024-03-22 DIAGNOSIS — G89.29 CHRONIC MIDLINE BACK PAIN, UNSPECIFIED BACK LOCATION: Primary | ICD-10-CM

## 2024-03-22 PROCEDURE — 99204 OFFICE O/P NEW MOD 45 MIN: CPT | Performed by: PHYSICAL MEDICINE & REHABILITATION

## 2024-03-22 RX ORDER — MELOXICAM 7.5 MG/1
7.5 TABLET ORAL DAILY
Qty: 45 TABLET | Refills: 1 | Status: SHIPPED | OUTPATIENT
Start: 2024-03-22 | End: 2024-03-30 | Stop reason: HOSPADM

## 2024-03-22 RX ORDER — ACETAMINOPHEN 500 MG
500 TABLET ORAL EVERY 6 HOURS PRN
COMMUNITY
End: 2024-03-30 | Stop reason: HOSPADM

## 2024-03-22 ASSESSMENT — PAIN SCALES - GENERAL: PAINLEVEL: 2

## 2024-03-22 NOTE — PROGRESS NOTES
Ref from Dr. Barahona lower back pain.  Has had donya mastectomy and a flap surgery and is having another surgery next week.  Wants to be proactive for back pain

## 2024-03-22 NOTE — PROGRESS NOTES
Physical Medicine and Rehabilitation MSK Consult  03/22/24       Chief Complaint:  Low back pain     HPI:  Cheyanne Rubio is a  53 y.o. F who presents to the clinic today  for evaluation of low back pain.  She has pmh of PALB2 mutation on both sides of her family is now sp prophylactic bilateral 11/2023 followed by immediate bilateral TRAM flap reconstruction, and reconstruction of abdominal wall with mesh. She is planning revision of breast reconstruction with Dr. Gorman in late March 2024 to reduce size and modify shape.       She has had pain in her back during her menstrual cycle.    Now coming since the surgeries  Location: across low back  Radiation: none  Quality: sharp  Duration: constant, flair ups  Flair ups occur once a week  Aggravating factors:  sitting too long  Alleviating factors: robaxin, tylenol and motrin help  Severity: 1 on average during flair up is 5  Numbness/Tingling: No  Bowel or bladder incontinence: No  Pt's current medication regimen includes:      Treatment to date:  Physical therapy: No  Medications taken to date for this complaint include the following:   As above  Prior injections: No        Not waking up from sleep.  No unintentional weight loss.       Imaging  None    Past Medical History:   Diagnosis Date    Abnormal uterine and vaginal bleeding, unspecified 06/08/2018    Abnormal uterine bleeding (AUB)    Acute bronchitis, unspecified 06/08/2018    Acute bronchitis    Acute pharyngitis, unspecified 06/08/2018    Pharyngitis    Anemia, unspecified 06/08/2018    Anemia    Benign neoplasm of unspecified ovary     Dermoid cyst of ovary    Dysmenorrhea, unspecified 06/08/2018    Dysmenorrhea    Dysuria 06/08/2018    Dysuria    Encounter for screening mammogram for malignant neoplasm of breast 06/08/2018    Visit for screening mammogram    Histoplasmosis, 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    Monoallelic mutation of PALB2 gene     Other ovarian dysfunction 10/24/2014     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    Pain in unspecified joint 06/08/2018    Arthralgia of multiple sites    Personal history of other diseases of the female genital tract     Personal history of endometriosis    Personal history of other diseases of the female genital tract     Personal history of female infertility    Polyp of corpus uteri     Uterine polyp    Stricture and stenosis of cervix uteri     Cervical stenosis (uterine cervix)    Thyroid nodule     Vitamin D deficiency, unspecified 06/08/2018    Vitamin D deficiency        Past Surgical History:   Procedure Laterality Date    CT ABDOMEN PELVIS ANGIOGRAM W AND/OR WO IV CONTRAST  05/22/2023    CT ABDOMEN PELVIS ANGIOGRAM W AND/OR WO IV CONTRAST 5/22/2023 U CT    DILATION AND CURETTAGE OF UTERUS  10/20/2014    Dilation And Curettage    EXPLORATORY LAPAROTOMY      HYSTEROSCOPY      LAPAROSCOPY DIAGNOSTIC / BIOPSY / ASPIRATION / LYSIS  10/20/2014    Exploratory Laparoscopy    OTHER SURGICAL HISTORY  10/20/2014    Hysteroscopy        Patient Active Problem List    Diagnosis Date Noted    Breast asymmetry following reconstructive surgery 03/20/2024    Encounter for breast cancer screening using non-mammogram modality 03/14/2024    Family history of breast cancer 03/14/2024    Status post bilateral mastectomy 03/14/2024    Back pain 03/14/2024    Deformity and disproportion of reconstructed breast 01/29/2024    Neuropathic pain 01/29/2024    Excess skin 01/29/2024    Encounter for postoperative wound check 11/27/2023    S/P breast reconstruction 11/27/2023    Numbness and tingling of left thumb 11/27/2023    Localized swelling of right upper extremity 11/16/2023    PONV (postoperative nausea and vomiting) 11/10/2023    Recent URI 11/10/2023    Status post breast reconstruction 11/09/2023    Encounter to discuss breast reconstruction 11/06/2023    Abnormal uterine bleeding (AUB) 10/16/2023    Dysmenorrhea 10/16/2023    Anemia 10/16/2023     Arthralgia of multiple sites 10/16/2023    Bilateral myopia 10/16/2023    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    Dysuria 10/16/2023    Enlarged thyroid 10/16/2023    Thyroid nodule 10/16/2023    Fatigue 10/16/2023    Seasonal allergies 10/16/2023    Vitamin D deficiency 10/16/2023    Macromastia 10/16/2023    Monoallelic mutation of PALB2 gene 10/16/2023    Excess skin of abdomen 02/11/2024    Excessive skin and subcutaneous tissue 02/11/2024    Excessive subcutaneous fat 02/11/2024        Family History   Problem Relation Name Age of Onset    Hypertension Mother      Pancreatic cancer Mother      Bone cancer Father      Pancreatic cancer Father      Prostate cancer Father      Rheum arthritis Maternal Grandmother      Breast cancer Other aunt         Current Outpatient Medications   Medication Sig Dispense Refill    acetaminophen (Tylenol) 500 mg tablet Take 1 tablet (500 mg) by mouth every 6 hours if needed for mild pain (1 - 3).      brimonidine 0.025 % drops Administer 1 drop into affected eye(s) once daily.      calcium citrate-vitamin D3 500 mg-12.5 mcg (500 unit) tablet,chewable Chew 1 tablet once daily.      fexofenadine (Allegra) 180 mg tablet Take 1 tablet (180 mg) by mouth once daily.      gabapentin (Neurontin) 300 mg capsule Take 1 capsule (300 mg) by mouth 2 times a day. 60 capsule 1    gabapentin (Neurontin) 300 mg capsule Take 1 capsule (300 mg) by mouth once daily at bedtime. 30 capsule 0    methocarbamol (Robaxin-750) 750 mg tablet Take 1 tablet (750 mg) by mouth 4 times a day for 10 days. 40 tablet 0    methocarbamol (Robaxin-750) 750 mg tablet Take 1 tablet (750 mg) by mouth 4 times a day. 120 tablet 0    methocarbamol (Robaxin-750) 750 mg tablet Take 1 tablet (750 mg) by mouth 3 times a day. 90 tablet 0    psyllium (Metamucil) 0.4 gram capsule Take 1 capsule by mouth once daily.       No current facility-administered medications for this visit.        Allergies   Allergen Reactions     Oxycodone Confusion    Codeine-Guaifenesin Other    Codeine Nausea/vomiting, Rash and Other        Social History     Socioeconomic History    Marital status:      Spouse name: None    Number of children: None    Years of education: None    Highest education level: None   Occupational History    None   Tobacco Use    Smoking status: Never    Smokeless tobacco: Never   Vaping Use    Vaping Use: Never used   Substance and Sexual Activity    Alcohol use: Yes     Alcohol/week: 2.0 standard drinks of alcohol     Types: 2 Glasses of wine per week     Comment: occasionally    Drug use: Never    Sexual activity: Defer   Other Topics Concern    None   Social History Narrative    None     Social Determinants of Health     Financial Resource Strain: Low Risk  (11/11/2023)    Overall Financial Resource Strain (CARDIA)     Difficulty of Paying Living Expenses: Not hard at all   Food Insecurity: Not on file   Transportation Needs: No Transportation Needs (12/13/2023)    OASIS : Transportation     Lack of Transportation (Medical): No     Lack of Transportation (Non-Medical): No     Patient Unable or Declines to Respond: No   Physical Activity: Not on file   Stress: Not on file   Social Connections: Feeling Socially Integrated (12/13/2023)    OASIS : Social Isolation     Frequency of experiencing loneliness or isolation: Never   Intimate Partner Violence: Not on file   Housing Stability: Low Risk  (11/11/2023)    Housing Stability Vital Sign     Unable to Pay for Housing in the Last Year: No     Number of Places Lived in the Last Year: 1     Unstable Housing in the Last Year: No      Lives w   Runs a healthcare marketing, travels a lot  None      Review of Systems:  Constitutional:  Denies fever or chills, malaise, weight changes.   Eyes:  Denies change in visual acuity   HENT:  Denies nasal congestion or sore throat   Respiratory:  Denies cough or shortness of breath   Cardiovascular:  Denies chest pain or  edema   GI:  Denies abdominal pain, nausea, vomiting, bloody stools or diarrhea   :  Denies dysuria   Integument:  Denies rash   Neurologic:  As per HPI  MSK: Per above HPI  Endocrine:  Denies polyuria or polydipsia   Lymphatic:  Denies swollen glands   Psychiatric:  Denies depression or anxiety            PHYSICAL EXAM:  /62 (BP Location: Left arm, Patient Position: Sitting)   Pulse 57   Resp 18   LMP  (LMP Unknown)     General:  NAD, well developed, [unfilled]      Psychiatric: appropriate mood & affect.   Cardiovascular:  Normal pedal pulses; no LE edema.  Respiratory:  Normal rate; unlabored breathing.  Skin:  Intact; no erythema; no ecchymosis or rash.  Lymphatic:  No lymphadenopathy or lymphedema.  NEURO:  Alert and appropriate. Speech fluent, conversing appropriately.   Motor:    Rt: HF 5/5, KE 5/5, KF 5/5, DF 5/5, EHL 5/5, PF 5/5.    Lt: HF 5/5, KE 5/5, KF 5/5, DF 5/5, EHL 5/5, PF 5/5.  Sensation:     Light touch: intact in the b/l L3-S1 dermatomes.    PP: intact in the b/l L3-S1 dermatomes  Reflexes:     Right:  patellar 2+, achilles 2+,     Left: patellar 2+, achilles 2+,     Babinski's downgoing b/l; no clonus  Gait: Normal, narrow based gait.     MSK:  Inspection reveals no evidence of a pelvic obliqity   Spinal range of motion: Flexion to 50° degrees, Extension of 30 degrees.  There is tenderness over the upper lumbar paraspinals bl.   Special tests:    Straight leg raise: -bl    Slump test: -bl    Facet loading: +bl          Impression: Cheyanne Rubio is a 53 y.o. w  who presents to the clinic today  for evaluation of low back pain.  She has pmh of PALB2 mutation on both sides of her family is now sp prophylactic bilateral 11/2023 followed by immediate bilateral TRAM flap reconstruction, and reconstruction of abdominal wall with mesh. She is planning revision of breast reconstruction with Dr. Gorman in late March 2024 to reduce size and modify shape. She is presenting with a axial back pain  likely related to core weakness, impaired posture in setting of post surgical recovery. She likely also has underlying lumbar spondylosis/facet arthropathy.    Plan:  Orders Placed This Encounter   Procedures    XR lumbar spine complete 4+ views    Referral to Physical Therapy    Referral to [facility]     Xr l spine  Referral to pt for core, rom, myofascial release, posture  [facility] referral for acupuncture  Mobic 7.5 mg bid prn, discussed no otc nsaids  Fu 3 months  6. Lidocaine patch prn     Thank you for the consultation.     Shruthi Madison MD  Physical Medicine and Rehabilitation

## 2024-03-26 ENCOUNTER — ANESTHESIA EVENT (OUTPATIENT)
Dept: OPERATING ROOM | Facility: HOSPITAL | Age: 54
DRG: 580 | End: 2024-03-26
Payer: COMMERCIAL

## 2024-03-27 ENCOUNTER — HOSPITAL ENCOUNTER (INPATIENT)
Facility: HOSPITAL | Age: 54
LOS: 3 days | Discharge: HOME | DRG: 580 | End: 2024-03-30
Admitting: NURSE PRACTITIONER
Payer: COMMERCIAL

## 2024-03-27 ENCOUNTER — ANESTHESIA (OUTPATIENT)
Dept: OPERATING ROOM | Facility: HOSPITAL | Age: 54
DRG: 580 | End: 2024-03-27
Payer: COMMERCIAL

## 2024-03-27 DIAGNOSIS — N65.1 DEFORMITY AND DISPROPORTION OF RECONSTRUCTED BREAST: ICD-10-CM

## 2024-03-27 DIAGNOSIS — N65.0 DEFORMITY AND DISPROPORTION OF RECONSTRUCTED BREAST: ICD-10-CM

## 2024-03-27 DIAGNOSIS — L98.7 EXCESS SKIN OF ABDOMEN: ICD-10-CM

## 2024-03-27 DIAGNOSIS — Z98.890 STATUS POST BILATERAL BREAST RECONSTRUCTION: ICD-10-CM

## 2024-03-27 DIAGNOSIS — G89.18 POST-OPERATIVE PAIN: ICD-10-CM

## 2024-03-27 DIAGNOSIS — N65.1 BREAST ASYMMETRY FOLLOWING RECONSTRUCTIVE SURGERY: ICD-10-CM

## 2024-03-27 DIAGNOSIS — E66.9 EXCESSIVE SUBCUTANEOUS FAT: ICD-10-CM

## 2024-03-27 DIAGNOSIS — L98.7 EXCESS SKIN OF BREAST: ICD-10-CM

## 2024-03-27 DIAGNOSIS — Z42.1 ENCOUNTER FOR BREAST RECONSTRUCTION FOLLOWING MASTECTOMY: Primary | ICD-10-CM

## 2024-03-27 LAB
ALBUMIN SERPL BCP-MCNC: 4.7 G/DL (ref 3.4–5)
ANION GAP SERPL CALC-SCNC: 18 MMOL/L (ref 10–20)
BUN SERPL-MCNC: 9 MG/DL (ref 6–23)
CALCIUM SERPL-MCNC: 9.1 MG/DL (ref 8.6–10.6)
CHLORIDE SERPL-SCNC: 102 MMOL/L (ref 98–107)
CO2 SERPL-SCNC: 23 MMOL/L (ref 21–32)
CREAT SERPL-MCNC: 0.78 MG/DL (ref 0.5–1.05)
EGFRCR SERPLBLD CKD-EPI 2021: >90 ML/MIN/1.73M*2
ERYTHROCYTE [DISTWIDTH] IN BLOOD BY AUTOMATED COUNT: 13.4 % (ref 11.5–14.5)
GLUCOSE SERPL-MCNC: 162 MG/DL (ref 74–99)
HCT VFR BLD AUTO: 32.5 % (ref 36–46)
HGB BLD-MCNC: 11.1 G/DL (ref 12–16)
MAGNESIUM SERPL-MCNC: 1.89 MG/DL (ref 1.6–2.4)
MCH RBC QN AUTO: 30.4 PG (ref 26–34)
MCHC RBC AUTO-ENTMCNC: 34.2 G/DL (ref 32–36)
MCV RBC AUTO: 89 FL (ref 80–100)
NRBC BLD-RTO: 0 /100 WBCS (ref 0–0)
PHOSPHATE SERPL-MCNC: 4.4 MG/DL (ref 2.5–4.9)
PLATELET # BLD AUTO: 153 X10*3/UL (ref 150–450)
POTASSIUM SERPL-SCNC: 6.9 MMOL/L (ref 3.5–5.3)
PREGNANCY TEST URINE, POC: NEGATIVE
RBC # BLD AUTO: 3.65 X10*6/UL (ref 4–5.2)
SODIUM SERPL-SCNC: 136 MMOL/L (ref 136–145)
WBC # BLD AUTO: 14.6 X10*3/UL (ref 4.4–11.3)

## 2024-03-27 PROCEDURE — 2500000004 HC RX 250 GENERAL PHARMACY W/ HCPCS (ALT 636 FOR OP/ED): Mod: JZ,JG

## 2024-03-27 PROCEDURE — 0J080ZZ ALTERATION OF ABDOMEN SUBCUTANEOUS TISSUE AND FASCIA, OPEN APPROACH: ICD-10-PCS

## 2024-03-27 PROCEDURE — 2500000004 HC RX 250 GENERAL PHARMACY W/ HCPCS (ALT 636 FOR OP/ED): Performed by: PHYSICIAN ASSISTANT

## 2024-03-27 PROCEDURE — 36415 COLL VENOUS BLD VENIPUNCTURE: CPT | Performed by: NURSE PRACTITIONER

## 2024-03-27 PROCEDURE — 0J0F0ZZ ALTERATION OF LEFT UPPER ARM SUBCUTANEOUS TISSUE AND FASCIA, OPEN APPROACH: ICD-10-PCS

## 2024-03-27 PROCEDURE — 2500000004 HC RX 250 GENERAL PHARMACY W/ HCPCS (ALT 636 FOR OP/ED): Performed by: STUDENT IN AN ORGANIZED HEALTH CARE EDUCATION/TRAINING PROGRAM

## 2024-03-27 PROCEDURE — 2500000004 HC RX 250 GENERAL PHARMACY W/ HCPCS (ALT 636 FOR OP/ED)

## 2024-03-27 PROCEDURE — A15877 PR SUCT ASSIS LIPECTOMY,TRUNK: Performed by: STUDENT IN AN ORGANIZED HEALTH CARE EDUCATION/TRAINING PROGRAM

## 2024-03-27 PROCEDURE — A4217 STERILE WATER/SALINE, 500 ML: HCPCS

## 2024-03-27 PROCEDURE — 2500000005 HC RX 250 GENERAL PHARMACY W/O HCPCS

## 2024-03-27 PROCEDURE — 3600000008 HC OR TIME - EACH INCREMENTAL 1 MINUTE - PROCEDURE LEVEL THREE

## 2024-03-27 PROCEDURE — S0109 METHADONE ORAL 5MG: HCPCS

## 2024-03-27 PROCEDURE — 7100000002 HC RECOVERY ROOM TIME - EACH INCREMENTAL 1 MINUTE

## 2024-03-27 PROCEDURE — 2720000007 HC OR 272 NO HCPCS

## 2024-03-27 PROCEDURE — 83735 ASSAY OF MAGNESIUM: CPT | Performed by: NURSE PRACTITIONER

## 2024-03-27 PROCEDURE — 2060000001 HC INTERMEDIATE ICU ROOM DAILY

## 2024-03-27 PROCEDURE — 3700000002 HC GENERAL ANESTHESIA TIME - EACH INCREMENTAL 1 MINUTE: Mod: CSM

## 2024-03-27 PROCEDURE — 11406 EXC TR-EXT B9+MARG >4.0 CM: CPT

## 2024-03-27 PROCEDURE — 0J0D0ZZ ALTERATION OF RIGHT UPPER ARM SUBCUTANEOUS TISSUE AND FASCIA, OPEN APPROACH: ICD-10-PCS

## 2024-03-27 PROCEDURE — 15836 EXC EXCESSIVE SKIN ARM: CPT | Performed by: NURSE PRACTITIONER

## 2024-03-27 PROCEDURE — 2500000004 HC RX 250 GENERAL PHARMACY W/ HCPCS (ALT 636 FOR OP/ED): Performed by: NURSE PRACTITIONER

## 2024-03-27 PROCEDURE — 3600000003 HC OR TIME - INITIAL BASE CHARGE - PROCEDURE LEVEL THREE

## 2024-03-27 PROCEDURE — 85027 COMPLETE CBC AUTOMATED: CPT | Performed by: NURSE PRACTITIONER

## 2024-03-27 PROCEDURE — 3700000001 HC GENERAL ANESTHESIA TIME - INITIAL BASE CHARGE

## 2024-03-27 PROCEDURE — 2500000001 HC RX 250 WO HCPCS SELF ADMINISTERED DRUGS (ALT 637 FOR MEDICARE OP)

## 2024-03-27 PROCEDURE — 84132 ASSAY OF SERUM POTASSIUM: CPT | Performed by: PHYSICIAN ASSISTANT

## 2024-03-27 PROCEDURE — 99223 1ST HOSP IP/OBS HIGH 75: CPT | Performed by: STUDENT IN AN ORGANIZED HEALTH CARE EDUCATION/TRAINING PROGRAM

## 2024-03-27 PROCEDURE — 99024 POSTOP FOLLOW-UP VISIT: CPT | Performed by: NURSE PRACTITIONER

## 2024-03-27 PROCEDURE — 0HPT07Z REMOVAL OF AUTOLOGOUS TISSUE SUBSTITUTE FROM RIGHT BREAST, OPEN APPROACH: ICD-10-PCS

## 2024-03-27 PROCEDURE — 0H0V0ZZ ALTERATION OF BILATERAL BREAST, OPEN APPROACH: ICD-10-PCS

## 2024-03-27 PROCEDURE — 0HPU07Z REMOVAL OF AUTOLOGOUS TISSUE SUBSTITUTE FROM LEFT BREAST, OPEN APPROACH: ICD-10-PCS

## 2024-03-27 PROCEDURE — 2500000002 HC RX 250 W HCPCS SELF ADMINISTERED DRUGS (ALT 637 FOR MEDICARE OP, ALT 636 FOR OP/ED)

## 2024-03-27 PROCEDURE — 2500000005 HC RX 250 GENERAL PHARMACY W/O HCPCS: Performed by: STUDENT IN AN ORGANIZED HEALTH CARE EDUCATION/TRAINING PROGRAM

## 2024-03-27 PROCEDURE — 80069 RENAL FUNCTION PANEL: CPT | Performed by: NURSE PRACTITIONER

## 2024-03-27 PROCEDURE — 19380 REVJ RECONSTRUCTED BREAST: CPT

## 2024-03-27 PROCEDURE — 0J083ZZ ALTERATION OF ABDOMEN SUBCUTANEOUS TISSUE AND FASCIA, PERCUTANEOUS APPROACH: ICD-10-PCS

## 2024-03-27 PROCEDURE — P9045 ALBUMIN (HUMAN), 5%, 250 ML: HCPCS | Mod: JZ,JG

## 2024-03-27 PROCEDURE — 15877 SUCTION LIPECTOMY TRUNK: CPT

## 2024-03-27 PROCEDURE — 15836 EXC EXCESSIVE SKIN ARM: CPT

## 2024-03-27 PROCEDURE — 7100000001 HC RECOVERY ROOM TIME - INITIAL BASE CHARGE

## 2024-03-27 PROCEDURE — 36415 COLL VENOUS BLD VENIPUNCTURE: CPT | Performed by: PHYSICIAN ASSISTANT

## 2024-03-27 PROCEDURE — 2500000001 HC RX 250 WO HCPCS SELF ADMINISTERED DRUGS (ALT 637 FOR MEDICARE OP): Performed by: NURSE PRACTITIONER

## 2024-03-27 PROCEDURE — 81025 URINE PREGNANCY TEST: CPT | Performed by: STUDENT IN AN ORGANIZED HEALTH CARE EDUCATION/TRAINING PROGRAM

## 2024-03-27 PROCEDURE — 15877 SUCTION LIPECTOMY TRUNK: CPT | Performed by: NURSE PRACTITIONER

## 2024-03-27 RX ORDER — ALBUTEROL SULFATE 0.83 MG/ML
2.5 SOLUTION RESPIRATORY (INHALATION) ONCE AS NEEDED
Status: DISCONTINUED | OUTPATIENT
Start: 2024-03-27 | End: 2024-03-27 | Stop reason: HOSPADM

## 2024-03-27 RX ORDER — GABAPENTIN 300 MG/1
CAPSULE ORAL AS NEEDED
Status: DISCONTINUED | OUTPATIENT
Start: 2024-03-27 | End: 2024-03-27

## 2024-03-27 RX ORDER — ONDANSETRON HYDROCHLORIDE 2 MG/ML
INJECTION, SOLUTION INTRAVENOUS AS NEEDED
Status: DISCONTINUED | OUTPATIENT
Start: 2024-03-27 | End: 2024-03-27

## 2024-03-27 RX ORDER — PROPOFOL 10 MG/ML
INJECTION, EMULSION INTRAVENOUS AS NEEDED
Status: DISCONTINUED | OUTPATIENT
Start: 2024-03-27 | End: 2024-03-27

## 2024-03-27 RX ORDER — PHENYLEPHRINE HCL IN 0.9% NACL 0.4MG/10ML
SYRINGE (ML) INTRAVENOUS AS NEEDED
Status: DISCONTINUED | OUTPATIENT
Start: 2024-03-27 | End: 2024-03-27

## 2024-03-27 RX ORDER — TRAMADOL HYDROCHLORIDE 50 MG/1
50 TABLET ORAL EVERY 6 HOURS PRN
Status: DISCONTINUED | OUTPATIENT
Start: 2024-03-27 | End: 2024-03-28

## 2024-03-27 RX ORDER — CEFAZOLIN 1 G/1
INJECTION, POWDER, FOR SOLUTION INTRAVENOUS AS NEEDED
Status: DISCONTINUED | OUTPATIENT
Start: 2024-03-27 | End: 2024-03-27

## 2024-03-27 RX ORDER — HYDROMORPHONE HYDROCHLORIDE 1 MG/ML
INJECTION, SOLUTION INTRAMUSCULAR; INTRAVENOUS; SUBCUTANEOUS AS NEEDED
Status: DISCONTINUED | OUTPATIENT
Start: 2024-03-27 | End: 2024-03-27

## 2024-03-27 RX ORDER — ONDANSETRON 4 MG/1
4 TABLET, FILM COATED ORAL EVERY 8 HOURS PRN
Status: DISCONTINUED | OUTPATIENT
Start: 2024-03-27 | End: 2024-03-28

## 2024-03-27 RX ORDER — ACETAMINOPHEN 325 MG/1
TABLET ORAL AS NEEDED
Status: DISCONTINUED | OUTPATIENT
Start: 2024-03-27 | End: 2024-03-27

## 2024-03-27 RX ORDER — HYDROMORPHONE HYDROCHLORIDE 1 MG/ML
0.1 INJECTION, SOLUTION INTRAMUSCULAR; INTRAVENOUS; SUBCUTANEOUS EVERY 5 MIN PRN
Status: DISCONTINUED | OUTPATIENT
Start: 2024-03-27 | End: 2024-03-27 | Stop reason: HOSPADM

## 2024-03-27 RX ORDER — SODIUM CHLORIDE, SODIUM LACTATE, POTASSIUM CHLORIDE, CALCIUM CHLORIDE 600; 310; 30; 20 MG/100ML; MG/100ML; MG/100ML; MG/100ML
INJECTION, SOLUTION INTRAVENOUS CONTINUOUS PRN
Status: DISCONTINUED | OUTPATIENT
Start: 2024-03-27 | End: 2024-03-27

## 2024-03-27 RX ORDER — KETOROLAC TROMETHAMINE 30 MG/ML
30 INJECTION, SOLUTION INTRAMUSCULAR; INTRAVENOUS EVERY 6 HOURS SCHEDULED
Status: DISCONTINUED | OUTPATIENT
Start: 2024-03-28 | End: 2024-03-30 | Stop reason: HOSPADM

## 2024-03-27 RX ORDER — ROCURONIUM BROMIDE 10 MG/ML
INJECTION, SOLUTION INTRAVENOUS AS NEEDED
Status: DISCONTINUED | OUTPATIENT
Start: 2024-03-27 | End: 2024-03-27

## 2024-03-27 RX ORDER — HYDROMORPHONE HYDROCHLORIDE 1 MG/ML
0.2 INJECTION, SOLUTION INTRAMUSCULAR; INTRAVENOUS; SUBCUTANEOUS EVERY 4 HOURS PRN
Status: DISCONTINUED | OUTPATIENT
Start: 2024-03-27 | End: 2024-03-28

## 2024-03-27 RX ORDER — CEFAZOLIN SODIUM 2 G/100ML
2 INJECTION, SOLUTION INTRAVENOUS EVERY 8 HOURS
Status: COMPLETED | OUTPATIENT
Start: 2024-03-27 | End: 2024-03-28

## 2024-03-27 RX ORDER — DOCUSATE SODIUM 50 MG/5ML
100 LIQUID ORAL 2 TIMES DAILY
Status: DISCONTINUED | OUTPATIENT
Start: 2024-03-27 | End: 2024-03-29

## 2024-03-27 RX ORDER — KETOROLAC TROMETHAMINE 30 MG/ML
INJECTION, SOLUTION INTRAMUSCULAR; INTRAVENOUS AS NEEDED
Status: DISCONTINUED | OUTPATIENT
Start: 2024-03-27 | End: 2024-03-27

## 2024-03-27 RX ORDER — SCOLOPAMINE TRANSDERMAL SYSTEM 1 MG/1
PATCH, EXTENDED RELEASE TRANSDERMAL AS NEEDED
Status: DISCONTINUED | OUTPATIENT
Start: 2024-03-27 | End: 2024-03-27

## 2024-03-27 RX ORDER — LIDOCAINE HYDROCHLORIDE 10 MG/ML
0.1 INJECTION INFILTRATION; PERINEURAL ONCE
Status: DISCONTINUED | OUTPATIENT
Start: 2024-03-27 | End: 2024-03-27 | Stop reason: HOSPADM

## 2024-03-27 RX ORDER — ENOXAPARIN SODIUM 100 MG/ML
40 INJECTION SUBCUTANEOUS EVERY 24 HOURS
Status: DISCONTINUED | OUTPATIENT
Start: 2024-03-27 | End: 2024-03-30 | Stop reason: HOSPADM

## 2024-03-27 RX ORDER — ADHESIVE BANDAGE
30 BANDAGE TOPICAL DAILY PRN
Status: DISCONTINUED | OUTPATIENT
Start: 2024-03-27 | End: 2024-03-30 | Stop reason: HOSPADM

## 2024-03-27 RX ORDER — HYDROMORPHONE HYDROCHLORIDE 1 MG/ML
0.2 INJECTION, SOLUTION INTRAMUSCULAR; INTRAVENOUS; SUBCUTANEOUS EVERY 5 MIN PRN
Status: DISCONTINUED | OUTPATIENT
Start: 2024-03-27 | End: 2024-03-27 | Stop reason: HOSPADM

## 2024-03-27 RX ORDER — DIPHENHYDRAMINE HYDROCHLORIDE 50 MG/ML
25 INJECTION INTRAMUSCULAR; INTRAVENOUS EVERY 4 HOURS PRN
Status: DISCONTINUED | OUTPATIENT
Start: 2024-03-27 | End: 2024-03-30 | Stop reason: HOSPADM

## 2024-03-27 RX ORDER — FENTANYL CITRATE 50 UG/ML
INJECTION, SOLUTION INTRAMUSCULAR; INTRAVENOUS AS NEEDED
Status: DISCONTINUED | OUTPATIENT
Start: 2024-03-27 | End: 2024-03-27

## 2024-03-27 RX ORDER — PROPOFOL 10 MG/ML
INJECTION, EMULSION INTRAVENOUS CONTINUOUS PRN
Status: DISCONTINUED | OUTPATIENT
Start: 2024-03-27 | End: 2024-03-27

## 2024-03-27 RX ORDER — APREPITANT 40 MG/1
CAPSULE ORAL AS NEEDED
Status: DISCONTINUED | OUTPATIENT
Start: 2024-03-27 | End: 2024-03-27

## 2024-03-27 RX ORDER — METHOCARBAMOL 100 MG/ML
1000 INJECTION, SOLUTION INTRAMUSCULAR; INTRAVENOUS ONCE
Status: DISCONTINUED | OUTPATIENT
Start: 2024-03-27 | End: 2024-03-27 | Stop reason: HOSPADM

## 2024-03-27 RX ORDER — LABETALOL HYDROCHLORIDE 5 MG/ML
5 INJECTION, SOLUTION INTRAVENOUS ONCE AS NEEDED
Status: DISCONTINUED | OUTPATIENT
Start: 2024-03-27 | End: 2024-03-27 | Stop reason: HOSPADM

## 2024-03-27 RX ORDER — HYDROMORPHONE HYDROCHLORIDE 1 MG/ML
0.4 INJECTION, SOLUTION INTRAMUSCULAR; INTRAVENOUS; SUBCUTANEOUS EVERY 5 MIN PRN
Status: DISCONTINUED | OUTPATIENT
Start: 2024-03-27 | End: 2024-03-27 | Stop reason: HOSPADM

## 2024-03-27 RX ORDER — METHADONE HYDROCHLORIDE 5 MG/1
TABLET ORAL AS NEEDED
Status: DISCONTINUED | OUTPATIENT
Start: 2024-03-27 | End: 2024-03-27

## 2024-03-27 RX ORDER — METHOCARBAMOL 100 MG/ML
1000 INJECTION, SOLUTION INTRAMUSCULAR; INTRAVENOUS EVERY 8 HOURS
Status: DISCONTINUED | OUTPATIENT
Start: 2024-03-27 | End: 2024-03-30 | Stop reason: HOSPADM

## 2024-03-27 RX ORDER — LIDOCAINE HYDROCHLORIDE 20 MG/ML
INJECTION, SOLUTION INFILTRATION; PERINEURAL AS NEEDED
Status: DISCONTINUED | OUTPATIENT
Start: 2024-03-27 | End: 2024-03-27

## 2024-03-27 RX ORDER — GLYCOPYRROLATE 0.2 MG/ML
INJECTION INTRAMUSCULAR; INTRAVENOUS AS NEEDED
Status: DISCONTINUED | OUTPATIENT
Start: 2024-03-27 | End: 2024-03-27

## 2024-03-27 RX ORDER — ONDANSETRON HYDROCHLORIDE 2 MG/ML
4 INJECTION, SOLUTION INTRAVENOUS EVERY 8 HOURS PRN
Status: DISCONTINUED | OUTPATIENT
Start: 2024-03-27 | End: 2024-03-28

## 2024-03-27 RX ORDER — ONDANSETRON HYDROCHLORIDE 2 MG/ML
4 INJECTION, SOLUTION INTRAVENOUS ONCE AS NEEDED
Status: DISCONTINUED | OUTPATIENT
Start: 2024-03-27 | End: 2024-03-27 | Stop reason: HOSPADM

## 2024-03-27 RX ORDER — ALBUMIN HUMAN 50 G/1000ML
SOLUTION INTRAVENOUS AS NEEDED
Status: DISCONTINUED | OUTPATIENT
Start: 2024-03-27 | End: 2024-03-27

## 2024-03-27 RX ORDER — ROPIVACAINE IN 0.9% SOD CHL/PF 0.2 %
14 PLASTIC BAG, INJECTION (ML) EPIDURAL CONTINUOUS
Status: DISCONTINUED | OUTPATIENT
Start: 2024-03-27 | End: 2024-03-30

## 2024-03-27 RX ORDER — GABAPENTIN 300 MG/1
300 CAPSULE ORAL 3 TIMES DAILY
Status: DISCONTINUED | OUTPATIENT
Start: 2024-03-27 | End: 2024-03-30 | Stop reason: HOSPADM

## 2024-03-27 RX ORDER — ACETAMINOPHEN 325 MG/1
650 TABLET ORAL EVERY 6 HOURS SCHEDULED
Status: DISCONTINUED | OUTPATIENT
Start: 2024-03-28 | End: 2024-03-30 | Stop reason: HOSPADM

## 2024-03-27 RX ORDER — MIDAZOLAM HYDROCHLORIDE 1 MG/ML
INJECTION INTRAMUSCULAR; INTRAVENOUS AS NEEDED
Status: DISCONTINUED | OUTPATIENT
Start: 2024-03-27 | End: 2024-03-27

## 2024-03-27 RX ORDER — SODIUM CHLORIDE 0.9 G/100ML
IRRIGANT IRRIGATION AS NEEDED
Status: DISCONTINUED | OUTPATIENT
Start: 2024-03-27 | End: 2024-03-27 | Stop reason: HOSPADM

## 2024-03-27 RX ORDER — MEPERIDINE HYDROCHLORIDE 25 MG/ML
25 INJECTION INTRAMUSCULAR; INTRAVENOUS; SUBCUTANEOUS ONCE
Status: COMPLETED | OUTPATIENT
Start: 2024-03-27 | End: 2024-03-27

## 2024-03-27 RX ORDER — ROPIVACAINE HYDROCHLORIDE 5 MG/ML
INJECTION, SOLUTION EPIDURAL; INFILTRATION; PERINEURAL AS NEEDED
Status: DISCONTINUED | OUTPATIENT
Start: 2024-03-27 | End: 2024-03-27

## 2024-03-27 RX ORDER — HYDRALAZINE HYDROCHLORIDE 20 MG/ML
5 INJECTION INTRAMUSCULAR; INTRAVENOUS EVERY 30 MIN PRN
Status: DISCONTINUED | OUTPATIENT
Start: 2024-03-27 | End: 2024-03-27 | Stop reason: HOSPADM

## 2024-03-27 RX ORDER — TRAMADOL HYDROCHLORIDE 50 MG/1
50 TABLET ORAL EVERY 4 HOURS PRN
Status: DISCONTINUED | OUTPATIENT
Start: 2024-03-27 | End: 2024-03-27

## 2024-03-27 RX ORDER — SODIUM CHLORIDE, SODIUM LACTATE, POTASSIUM CHLORIDE, CALCIUM CHLORIDE 600; 310; 30; 20 MG/100ML; MG/100ML; MG/100ML; MG/100ML
100 INJECTION, SOLUTION INTRAVENOUS CONTINUOUS
Status: DISCONTINUED | OUTPATIENT
Start: 2024-03-27 | End: 2024-03-27 | Stop reason: HOSPADM

## 2024-03-27 RX ADMIN — Medication 80 MCG: at 09:46

## 2024-03-27 RX ADMIN — ROCURONIUM BROMIDE 20 MG: 10 INJECTION INTRAVENOUS at 11:38

## 2024-03-27 RX ADMIN — FENTANYL CITRATE 100 MCG: 50 INJECTION, SOLUTION INTRAMUSCULAR; INTRAVENOUS at 09:10

## 2024-03-27 RX ADMIN — Medication 40 MCG: at 11:26

## 2024-03-27 RX ADMIN — ROCURONIUM BROMIDE 30 MG: 10 INJECTION INTRAVENOUS at 12:39

## 2024-03-27 RX ADMIN — GABAPENTIN 300 MG: 300 CAPSULE ORAL at 22:50

## 2024-03-27 RX ADMIN — ROCURONIUM BROMIDE 20 MG: 10 INJECTION INTRAVENOUS at 15:33

## 2024-03-27 RX ADMIN — Medication 20 MG: at 12:44

## 2024-03-27 RX ADMIN — ROPIVACAINE HYDROCHLORIDE 15 ML: 5 INJECTION, SOLUTION EPIDURAL; INFILTRATION; PERINEURAL at 07:20

## 2024-03-27 RX ADMIN — PROPOFOL 75 MCG/KG/MIN: 10 INJECTION, EMULSION INTRAVENOUS at 09:17

## 2024-03-27 RX ADMIN — PROPOFOL 40000 MCG: 10 INJECTION, EMULSION INTRAVENOUS at 18:13

## 2024-03-27 RX ADMIN — MEPERIDINE HYDROCHLORIDE 25 MG: 25 INJECTION INTRAMUSCULAR; INTRAVENOUS; SUBCUTANEOUS at 20:11

## 2024-03-27 RX ADMIN — Medication 80 MCG: at 09:55

## 2024-03-27 RX ADMIN — PROPOFOL 40000 MCG: 10 INJECTION, EMULSION INTRAVENOUS at 18:00

## 2024-03-27 RX ADMIN — ENOXAPARIN SODIUM 40 MG: 100 INJECTION SUBCUTANEOUS at 22:50

## 2024-03-27 RX ADMIN — Medication 30 MG: at 09:40

## 2024-03-27 RX ADMIN — KETOROLAC TROMETHAMINE 30 MG: 30 INJECTION, SOLUTION INTRAMUSCULAR; INTRAVENOUS at 19:29

## 2024-03-27 RX ADMIN — Medication 20 MG: at 10:24

## 2024-03-27 RX ADMIN — Medication 20 MG: at 13:50

## 2024-03-27 RX ADMIN — CEFAZOLIN 2 G: 1 INJECTION, POWDER, FOR SOLUTION INTRAMUSCULAR; INTRAVENOUS at 17:06

## 2024-03-27 RX ADMIN — Medication 10 MG: at 17:32

## 2024-03-27 RX ADMIN — Medication 20 MG: at 15:00

## 2024-03-27 RX ADMIN — HYDROMORPHONE HYDROCHLORIDE 0.2 MG: 1 INJECTION, SOLUTION INTRAMUSCULAR; INTRAVENOUS; SUBCUTANEOUS at 23:42

## 2024-03-27 RX ADMIN — DEXAMETHASONE SODIUM PHOSPHATE 4 MG: 4 INJECTION INTRA-ARTICULAR; INTRALESIONAL; INTRAMUSCULAR; INTRAVENOUS; SOFT TISSUE at 11:52

## 2024-03-27 RX ADMIN — LIDOCAINE HYDROCHLORIDE 100 MG: 20 INJECTION, SOLUTION INFILTRATION; PERINEURAL at 09:10

## 2024-03-27 RX ADMIN — ONDANSETRON 4 MG: 2 INJECTION INTRAMUSCULAR; INTRAVENOUS at 22:49

## 2024-03-27 RX ADMIN — SODIUM CHLORIDE, SODIUM LACTATE, POTASSIUM CHLORIDE, CALCIUM CHLORIDE: 600; 310; 30; 20 INJECTION, SOLUTION INTRAVENOUS at 17:54

## 2024-03-27 RX ADMIN — APREPITANT 40 MG: 40 CAPSULE ORAL at 08:15

## 2024-03-27 RX ADMIN — GABAPENTIN 600 MG: 300 CAPSULE ORAL at 08:15

## 2024-03-27 RX ADMIN — HYDROMORPHONE HYDROCHLORIDE 0.5 MG: 1 INJECTION, SOLUTION INTRAMUSCULAR; INTRAVENOUS; SUBCUTANEOUS at 19:53

## 2024-03-27 RX ADMIN — Medication 80 MCG: at 13:00

## 2024-03-27 RX ADMIN — ACETAMINOPHEN 975 MG: 325 TABLET ORAL at 08:15

## 2024-03-27 RX ADMIN — ALBUMIN (HUMAN) 250 ML: 12.5 SOLUTION INTRAVENOUS at 15:35

## 2024-03-27 RX ADMIN — Medication 14 ML/HR: at 16:51

## 2024-03-27 RX ADMIN — ROCURONIUM BROMIDE 20 MG: 10 INJECTION INTRAVENOUS at 16:23

## 2024-03-27 RX ADMIN — MIDAZOLAM HYDROCHLORIDE 2 MG: 1 INJECTION, SOLUTION INTRAMUSCULAR; INTRAVENOUS at 08:53

## 2024-03-27 RX ADMIN — CEFAZOLIN 2 G: 1 INJECTION, POWDER, FOR SOLUTION INTRAMUSCULAR; INTRAVENOUS at 09:22

## 2024-03-27 RX ADMIN — PROPOFOL 20000 MCG: 10 INJECTION, EMULSION INTRAVENOUS at 17:17

## 2024-03-27 RX ADMIN — ROCURONIUM BROMIDE 30 MG: 10 INJECTION INTRAVENOUS at 11:04

## 2024-03-27 RX ADMIN — HYDROMORPHONE HYDROCHLORIDE 0.4 MG: 1 INJECTION, SOLUTION INTRAMUSCULAR; INTRAVENOUS; SUBCUTANEOUS at 21:07

## 2024-03-27 RX ADMIN — Medication 120 MCG: at 10:41

## 2024-03-27 RX ADMIN — PROPOFOL 200 MG: 10 INJECTION, EMULSION INTRAVENOUS at 09:10

## 2024-03-27 RX ADMIN — METHOCARBAMOL 1000 MG: 100 INJECTION, SOLUTION INTRAMUSCULAR; INTRAVENOUS at 22:49

## 2024-03-27 RX ADMIN — SODIUM CHLORIDE, SODIUM LACTATE, POTASSIUM CHLORIDE, CALCIUM CHLORIDE: 600; 310; 30; 20 INJECTION, SOLUTION INTRAVENOUS at 08:57

## 2024-03-27 RX ADMIN — SODIUM CHLORIDE, POTASSIUM CHLORIDE, SODIUM LACTATE AND CALCIUM CHLORIDE: 600; 310; 30; 20 INJECTION, SOLUTION INTRAVENOUS at 15:35

## 2024-03-27 RX ADMIN — ACETAMINOPHEN 1000 MG: 325 TABLET ORAL at 20:20

## 2024-03-27 RX ADMIN — Medication 10 MG: at 16:07

## 2024-03-27 RX ADMIN — PROPOFOL 30000 MCG: 10 INJECTION, EMULSION INTRAVENOUS at 17:57

## 2024-03-27 RX ADMIN — ROCURONIUM BROMIDE 5 MG: 10 INJECTION INTRAVENOUS at 19:11

## 2024-03-27 RX ADMIN — SODIUM CHLORIDE, POTASSIUM CHLORIDE, SODIUM LACTATE AND CALCIUM CHLORIDE: 600; 310; 30; 20 INJECTION, SOLUTION INTRAVENOUS at 08:57

## 2024-03-27 RX ADMIN — GLYCOPYRROLATE 0.2 MG: 0.2 INJECTION INTRAMUSCULAR; INTRAVENOUS at 07:19

## 2024-03-27 RX ADMIN — ROCURONIUM BROMIDE 30 MG: 10 INJECTION INTRAVENOUS at 13:26

## 2024-03-27 RX ADMIN — PROPOFOL 7000 MCG: 10 INJECTION, EMULSION INTRAVENOUS at 18:51

## 2024-03-27 RX ADMIN — ROCURONIUM BROMIDE 30 MG: 10 INJECTION INTRAVENOUS at 10:24

## 2024-03-27 RX ADMIN — METHADONE HYDROCHLORIDE 20 MG: 5 TABLET ORAL at 08:15

## 2024-03-27 RX ADMIN — SUGAMMADEX 200 MG: 100 INJECTION, SOLUTION INTRAVENOUS at 19:42

## 2024-03-27 RX ADMIN — CEFAZOLIN 2 G: 1 INJECTION, POWDER, FOR SOLUTION INTRAMUSCULAR; INTRAVENOUS at 13:22

## 2024-03-27 RX ADMIN — Medication 6 L/MIN: at 19:56

## 2024-03-27 RX ADMIN — ROCURONIUM BROMIDE 10 MG: 10 INJECTION INTRAVENOUS at 17:44

## 2024-03-27 RX ADMIN — ONDANSETRON 4 MG: 2 INJECTION, SOLUTION INTRAMUSCULAR; INTRAVENOUS at 19:04

## 2024-03-27 RX ADMIN — ROCURONIUM BROMIDE 10 MG: 10 INJECTION INTRAVENOUS at 18:13

## 2024-03-27 RX ADMIN — ROCURONIUM BROMIDE 20 MG: 10 INJECTION INTRAVENOUS at 14:45

## 2024-03-27 RX ADMIN — SCOPALAMINE 1 PATCH: 1 PATCH, EXTENDED RELEASE TRANSDERMAL at 08:15

## 2024-03-27 RX ADMIN — ROCURONIUM BROMIDE 50 MG: 10 INJECTION INTRAVENOUS at 09:10

## 2024-03-27 RX ADMIN — ROCURONIUM BROMIDE 2.5 MG: 10 INJECTION INTRAVENOUS at 18:55

## 2024-03-27 RX ADMIN — ROCURONIUM BROMIDE 40 MG: 10 INJECTION INTRAVENOUS at 09:37

## 2024-03-27 RX ADMIN — ROCURONIUM BROMIDE 20 MG: 10 INJECTION INTRAVENOUS at 12:07

## 2024-03-27 RX ADMIN — ROPIVACAINE HYDROCHLORIDE 15 ML: 5 INJECTION, SOLUTION EPIDURAL; INFILTRATION; PERINEURAL at 07:28

## 2024-03-27 RX ADMIN — ROCURONIUM BROMIDE 30 MG: 10 INJECTION INTRAVENOUS at 14:02

## 2024-03-27 RX ADMIN — ALBUMIN (HUMAN) 250 ML: 12.5 SOLUTION INTRAVENOUS at 13:01

## 2024-03-27 RX ADMIN — Medication 20 MG: at 11:45

## 2024-03-27 SDOH — HEALTH STABILITY: MENTAL HEALTH: CURRENT SMOKER: 0

## 2024-03-27 ASSESSMENT — PAIN - FUNCTIONAL ASSESSMENT
PAIN_FUNCTIONAL_ASSESSMENT: 0-10

## 2024-03-27 ASSESSMENT — COLUMBIA-SUICIDE SEVERITY RATING SCALE - C-SSRS
1. IN THE PAST MONTH, HAVE YOU WISHED YOU WERE DEAD OR WISHED YOU COULD GO TO SLEEP AND NOT WAKE UP?: NO
6. HAVE YOU EVER DONE ANYTHING, STARTED TO DO ANYTHING, OR PREPARED TO DO ANYTHING TO END YOUR LIFE?: NO
2. HAVE YOU ACTUALLY HAD ANY THOUGHTS OF KILLING YOURSELF?: NO

## 2024-03-27 ASSESSMENT — PAIN SCALES - GENERAL
PAINLEVEL_OUTOF10: 5 - MODERATE PAIN
PAINLEVEL_OUTOF10: 2
PAINLEVEL_OUTOF10: 4
PAIN_LEVEL: 3
PAINLEVEL_OUTOF10: 7
PAINLEVEL_OUTOF10: 2
PAINLEVEL_OUTOF10: 5 - MODERATE PAIN
PAINLEVEL_OUTOF10: 0 - NO PAIN
PAINLEVEL_OUTOF10: 4
PAINLEVEL_OUTOF10: 4
PAINLEVEL_OUTOF10: 7

## 2024-03-27 ASSESSMENT — PAIN DESCRIPTION - LOCATION: LOCATION: ARM

## 2024-03-27 NOTE — ANESTHESIA PROCEDURE NOTES
Peripheral Block    Patient location during procedure: pre-op  Start time: 3/27/2024 7:18 AM  End time: 3/27/2024 7:30 AM  Reason for block: post-op pain management  Staffing  Performed: resident   Authorized by: Sunil Berrios MD    Performed by: Ngozi Bailey MD  Preanesthetic Checklist  Completed: patient identified, IV checked, site marked, risks and benefits discussed, surgical consent, monitors and equipment checked, pre-op evaluation and timeout performed   Timeout performed at: 3/27/2024 7:18 AM  Peripheral Block  Patient position: sitting  Prep: ChloraPrep  Patient monitoring: heart rate and continuous pulse ox  : Paravertebral.  Injection technique: catheter  Guidance: ultrasound guided  Local infiltration: lidocaine  Needle  Needle type: Tuohy   Needle gauge: 26 G  Needle length: 8 cm  Needle localization: ultrasound guidance     image stored in chart  Assessment  Injection assessment: negative aspiration for heme, no paresthesia on injection, incremental injection and local visualized surrounding nerve on ultrasound  Heart rate change: no  Slow fractionated injection: no  Additional Notes  Paravertebral block: Informed consent obtained.  Risks, benefits, and alternatives discussed.  ASA monitors placed, and timeout performed.  Patient positioned, prepped with chlorhexidine, and draped with sterile towels. Site marked at T4 . Needle inserted at marked TP until os felt. Needle retracted then redirected at 60 degree angle caudad. Aspiration was negative.  15 cc of 0.5 percent ropivacaine injected without resistance bilaterally and catheter threaded. Pt received 0.2 mg glycopyrrolate. Patient tolerated procedure well.    Timeout by CONCEPCIÓN Moscoso

## 2024-03-27 NOTE — ANESTHESIA PREPROCEDURE EVALUATION
Patient: Cheyanne Rubio    Procedure Information       Date/Time: 03/27/24 0815    Procedures:       Breast Reconstruction Revision w/Free Flap (Bilateral)      Dermatolipectomy Abdomen (Abdomen)      Excision Lesion Skin Torso (Abdomen)      Repair Laceration Torso (Abdomen)      Dermatolipectomy Upper Extremity (Bilateral)    Location: [facility] OR 10 / [facility]    Surgeons: Crystal Gorman MD            Relevant Problems   Anesthesia   (+) PONV (postoperative nausea and vomiting)      Cardiac (within normal limits)      Pulmonary   (+) Recent URI      Neuro (within normal limits)      GI (within normal limits)      /Renal (within normal limits)      Liver (within normal limits)      Endocrine   (+) Enlarged thyroid      Hematology   (+) Anemia      HEENT   (+) Seasonal allergies      GYN   (+) Abnormal uterine bleeding (AUB)       Clinical information reviewed:    Allergies  Meds               NPO Detail:  NPO/Void Status  Date of Last Liquid: 03/26/24  Time of Last Liquid: 1800  Date of Last Solid: 03/26/24  Time of Last Solid: 1800         Physical Exam    Airway  Mallampati: II  TM distance: >3 FB  Neck ROM: full     Cardiovascular    Dental    Pulmonary    Abdominal            Anesthesia Plan    History of general anesthesia?: yes  History of complications of general anesthesia?: yes    ASA 2     general     The patient is not a current smoker.    Anesthetic plan and risks discussed with patient.  Use of blood products discussed with patient who.    Plan discussed with CRNA and attending.

## 2024-03-27 NOTE — ANESTHESIA PROCEDURE NOTES
Peripheral IV  Date/Time: 3/27/2024 4:54 PM      Placement  Needle size: 20 G  Laterality: right  Location: external jugular

## 2024-03-27 NOTE — ANESTHESIA PROCEDURE NOTES
Peripheral IV  Date/Time: 3/27/2024 4:54 PM      Placement  Needle size: 18 G  Laterality: left  Location: foot

## 2024-03-28 LAB
ALBUMIN SERPL BCP-MCNC: 3.5 G/DL (ref 3.4–5)
ANION GAP SERPL CALC-SCNC: 13 MMOL/L (ref 10–20)
BUN SERPL-MCNC: 10 MG/DL (ref 6–23)
CALCIUM SERPL-MCNC: 8.4 MG/DL (ref 8.6–10.6)
CHLORIDE SERPL-SCNC: 105 MMOL/L (ref 98–107)
CO2 SERPL-SCNC: 23 MMOL/L (ref 21–32)
CREAT SERPL-MCNC: 0.85 MG/DL (ref 0.5–1.05)
EGFRCR SERPLBLD CKD-EPI 2021: 83 ML/MIN/1.73M*2
ERYTHROCYTE [DISTWIDTH] IN BLOOD BY AUTOMATED COUNT: 13.3 % (ref 11.5–14.5)
GLUCOSE SERPL-MCNC: 92 MG/DL (ref 74–99)
HCT VFR BLD AUTO: 25.2 % (ref 36–46)
HGB BLD-MCNC: 8.1 G/DL (ref 12–16)
MCH RBC QN AUTO: 30.2 PG (ref 26–34)
MCHC RBC AUTO-ENTMCNC: 32.1 G/DL (ref 32–36)
MCV RBC AUTO: 94 FL (ref 80–100)
NRBC BLD-RTO: 0 /100 WBCS (ref 0–0)
PHOSPHATE SERPL-MCNC: 4.2 MG/DL (ref 2.5–4.9)
PLATELET # BLD AUTO: 198 X10*3/UL (ref 150–450)
POTASSIUM SERPL-SCNC: 3.9 MMOL/L (ref 3.5–5.3)
POTASSIUM SERPL-SCNC: 4.2 MMOL/L (ref 3.5–5.3)
RBC # BLD AUTO: 2.68 X10*6/UL (ref 4–5.2)
SODIUM SERPL-SCNC: 137 MMOL/L (ref 136–145)
WBC # BLD AUTO: 10.1 X10*3/UL (ref 4.4–11.3)

## 2024-03-28 PROCEDURE — 2500000001 HC RX 250 WO HCPCS SELF ADMINISTERED DRUGS (ALT 637 FOR MEDICARE OP): Performed by: PHYSICIAN ASSISTANT

## 2024-03-28 PROCEDURE — 2500000004 HC RX 250 GENERAL PHARMACY W/ HCPCS (ALT 636 FOR OP/ED): Performed by: NURSE PRACTITIONER

## 2024-03-28 PROCEDURE — 36415 COLL VENOUS BLD VENIPUNCTURE: CPT | Performed by: PHYSICIAN ASSISTANT

## 2024-03-28 PROCEDURE — 97530 THERAPEUTIC ACTIVITIES: CPT | Mod: GP

## 2024-03-28 PROCEDURE — 2500000004 HC RX 250 GENERAL PHARMACY W/ HCPCS (ALT 636 FOR OP/ED): Performed by: PHYSICIAN ASSISTANT

## 2024-03-28 PROCEDURE — 99232 SBSQ HOSP IP/OBS MODERATE 35: CPT

## 2024-03-28 PROCEDURE — 97161 PT EVAL LOW COMPLEX 20 MIN: CPT | Mod: GP

## 2024-03-28 PROCEDURE — 2500000001 HC RX 250 WO HCPCS SELF ADMINISTERED DRUGS (ALT 637 FOR MEDICARE OP): Performed by: NURSE PRACTITIONER

## 2024-03-28 PROCEDURE — 99231 SBSQ HOSP IP/OBS SF/LOW 25: CPT | Performed by: STUDENT IN AN ORGANIZED HEALTH CARE EDUCATION/TRAINING PROGRAM

## 2024-03-28 PROCEDURE — 80069 RENAL FUNCTION PANEL: CPT | Performed by: PHYSICIAN ASSISTANT

## 2024-03-28 PROCEDURE — 97165 OT EVAL LOW COMPLEX 30 MIN: CPT | Mod: GO

## 2024-03-28 PROCEDURE — 2060000001 HC INTERMEDIATE ICU ROOM DAILY

## 2024-03-28 PROCEDURE — 85027 COMPLETE CBC AUTOMATED: CPT | Performed by: PHYSICIAN ASSISTANT

## 2024-03-28 RX ORDER — ONDANSETRON 4 MG/1
4 TABLET, FILM COATED ORAL EVERY 6 HOURS PRN
Status: DISCONTINUED | OUTPATIENT
Start: 2024-03-28 | End: 2024-03-30 | Stop reason: HOSPADM

## 2024-03-28 RX ORDER — HYDROMORPHONE HYDROCHLORIDE 1 MG/ML
0.2 INJECTION, SOLUTION INTRAMUSCULAR; INTRAVENOUS; SUBCUTANEOUS EVERY 4 HOURS PRN
Status: DISCONTINUED | OUTPATIENT
Start: 2024-03-28 | End: 2024-03-30

## 2024-03-28 RX ORDER — ASPIRIN 81 MG/1
81 TABLET ORAL DAILY
COMMUNITY
End: 2024-03-30 | Stop reason: HOSPADM

## 2024-03-28 RX ORDER — ONDANSETRON HYDROCHLORIDE 2 MG/ML
4 INJECTION, SOLUTION INTRAVENOUS EVERY 6 HOURS PRN
Status: DISCONTINUED | OUTPATIENT
Start: 2024-03-28 | End: 2024-03-30 | Stop reason: HOSPADM

## 2024-03-28 RX ORDER — VIT C/E/ZN/COPPR/LUTEIN/ZEAXAN 250MG-90MG
25 CAPSULE ORAL DAILY
COMMUNITY

## 2024-03-28 RX ORDER — TRAMADOL HYDROCHLORIDE 50 MG/1
50 TABLET ORAL EVERY 6 HOURS
Status: DISCONTINUED | OUTPATIENT
Start: 2024-03-28 | End: 2024-03-30 | Stop reason: HOSPADM

## 2024-03-28 RX ADMIN — METHOCARBAMOL 1000 MG: 100 INJECTION, SOLUTION INTRAMUSCULAR; INTRAVENOUS at 22:48

## 2024-03-28 RX ADMIN — KETOROLAC TROMETHAMINE 30 MG: 30 INJECTION, SOLUTION INTRAMUSCULAR at 01:53

## 2024-03-28 RX ADMIN — TRAMADOL HYDROCHLORIDE 50 MG: 50 TABLET, COATED ORAL at 01:53

## 2024-03-28 RX ADMIN — ACETAMINOPHEN 650 MG: 325 TABLET ORAL at 18:25

## 2024-03-28 RX ADMIN — METHOCARBAMOL 1000 MG: 100 INJECTION, SOLUTION INTRAMUSCULAR; INTRAVENOUS at 05:48

## 2024-03-28 RX ADMIN — TRAMADOL HYDROCHLORIDE 50 MG: 50 TABLET, COATED ORAL at 08:50

## 2024-03-28 RX ADMIN — DOCUSATE SODIUM 100 MG: 50 LIQUID ORAL at 08:50

## 2024-03-28 RX ADMIN — ACETAMINOPHEN 650 MG: 325 TABLET ORAL at 05:48

## 2024-03-28 RX ADMIN — ACETAMINOPHEN 650 MG: 325 TABLET ORAL at 11:59

## 2024-03-28 RX ADMIN — GABAPENTIN 300 MG: 300 CAPSULE ORAL at 14:25

## 2024-03-28 RX ADMIN — CEFAZOLIN SODIUM 2 G: 2 INJECTION, SOLUTION INTRAVENOUS at 08:49

## 2024-03-28 RX ADMIN — GABAPENTIN 300 MG: 300 CAPSULE ORAL at 08:50

## 2024-03-28 RX ADMIN — DOCUSATE SODIUM 100 MG: 50 LIQUID ORAL at 20:50

## 2024-03-28 RX ADMIN — CEFAZOLIN SODIUM 2 G: 2 INJECTION, SOLUTION INTRAVENOUS at 00:36

## 2024-03-28 RX ADMIN — TRAMADOL HYDROCHLORIDE 50 MG: 50 TABLET, COATED ORAL at 14:25

## 2024-03-28 RX ADMIN — CEFAZOLIN SODIUM 2 G: 2 INJECTION, SOLUTION INTRAVENOUS at 17:00

## 2024-03-28 RX ADMIN — KETOROLAC TROMETHAMINE 30 MG: 30 INJECTION, SOLUTION INTRAMUSCULAR at 05:48

## 2024-03-28 RX ADMIN — ACETAMINOPHEN 650 MG: 325 TABLET ORAL at 01:53

## 2024-03-28 RX ADMIN — KETOROLAC TROMETHAMINE 30 MG: 30 INJECTION, SOLUTION INTRAMUSCULAR at 18:25

## 2024-03-28 RX ADMIN — METHOCARBAMOL 1000 MG: 100 INJECTION, SOLUTION INTRAMUSCULAR; INTRAVENOUS at 14:25

## 2024-03-28 RX ADMIN — KETOROLAC TROMETHAMINE 30 MG: 30 INJECTION, SOLUTION INTRAMUSCULAR at 11:58

## 2024-03-28 RX ADMIN — TRAMADOL HYDROCHLORIDE 50 MG: 50 TABLET, COATED ORAL at 20:50

## 2024-03-28 RX ADMIN — GABAPENTIN 300 MG: 300 CAPSULE ORAL at 20:50

## 2024-03-28 RX ADMIN — ONDANSETRON 4 MG: 2 INJECTION INTRAMUSCULAR; INTRAVENOUS at 05:47

## 2024-03-28 SDOH — SOCIAL STABILITY: SOCIAL INSECURITY: ARE YOU OR HAVE YOU BEEN THREATENED OR ABUSED PHYSICALLY, EMOTIONALLY, OR SEXUALLY BY ANYONE?: NO

## 2024-03-28 SDOH — SOCIAL STABILITY: SOCIAL INSECURITY: DO YOU FEEL ANYONE HAS EXPLOITED OR TAKEN ADVANTAGE OF YOU FINANCIALLY OR OF YOUR PERSONAL PROPERTY?: NO

## 2024-03-28 SDOH — SOCIAL STABILITY: SOCIAL INSECURITY: HAS ANYONE EVER THREATENED TO HURT YOUR FAMILY OR YOUR PETS?: NO

## 2024-03-28 SDOH — SOCIAL STABILITY: SOCIAL INSECURITY: DOES ANYONE TRY TO KEEP YOU FROM HAVING/CONTACTING OTHER FRIENDS OR DOING THINGS OUTSIDE YOUR HOME?: NO

## 2024-03-28 SDOH — SOCIAL STABILITY: SOCIAL INSECURITY: ARE THERE ANY APPARENT SIGNS OF INJURIES/BEHAVIORS THAT COULD BE RELATED TO ABUSE/NEGLECT?: NO

## 2024-03-28 SDOH — SOCIAL STABILITY: SOCIAL INSECURITY: ABUSE: ADULT

## 2024-03-28 SDOH — SOCIAL STABILITY: SOCIAL INSECURITY: DO YOU FEEL UNSAFE GOING BACK TO THE PLACE WHERE YOU ARE LIVING?: NO

## 2024-03-28 SDOH — SOCIAL STABILITY: SOCIAL INSECURITY: WERE YOU ABLE TO COMPLETE ALL THE BEHAVIORAL HEALTH SCREENINGS?: YES

## 2024-03-28 SDOH — SOCIAL STABILITY: SOCIAL INSECURITY: HAVE YOU HAD THOUGHTS OF HARMING ANYONE ELSE?: NO

## 2024-03-28 ASSESSMENT — COGNITIVE AND FUNCTIONAL STATUS - GENERAL
EATING MEALS: A LITTLE
EATING MEALS: A LITTLE
MOBILITY SCORE: 15
DRESSING REGULAR LOWER BODY CLOTHING: A LOT
STANDING UP FROM CHAIR USING ARMS: TOTAL
TURNING FROM BACK TO SIDE WHILE IN FLAT BAD: A LOT
MOBILITY SCORE: 12
MOVING TO AND FROM BED TO CHAIR: A LITTLE
HELP NEEDED FOR BATHING: A LOT
STANDING UP FROM CHAIR USING ARMS: A LITTLE
STANDING UP FROM CHAIR USING ARMS: A LOT
DAILY ACTIVITIY SCORE: 12
TURNING FROM BACK TO SIDE WHILE IN FLAT BAD: A LOT
MOVING TO AND FROM BED TO CHAIR: A LOT
HELP NEEDED FOR BATHING: A LOT
WALKING IN HOSPITAL ROOM: A LOT
EATING MEALS: A LITTLE
TURNING FROM BACK TO SIDE WHILE IN FLAT BAD: A LOT
WALKING IN HOSPITAL ROOM: A LOT
MOVING FROM LYING ON BACK TO SITTING ON SIDE OF FLAT BED WITH BEDRAILS: A LOT
DRESSING REGULAR UPPER BODY CLOTHING: A LITTLE
WALKING IN HOSPITAL ROOM: A LOT
DRESSING REGULAR UPPER BODY CLOTHING: A LOT
PERSONAL GROOMING: A LOT
CLIMB 3 TO 5 STEPS WITH RAILING: A LOT
TOILETING: A LITTLE
DRESSING REGULAR LOWER BODY CLOTHING: A LOT
MOBILITY SCORE: 11
DAILY ACTIVITIY SCORE: 24
HELP NEEDED FOR BATHING: A LOT
TURNING FROM BACK TO SIDE WHILE IN FLAT BAD: A LITTLE
MOVING TO AND FROM BED TO CHAIR: A LOT
PATIENT BASELINE BEDBOUND: NO
MOVING TO AND FROM BED TO CHAIR: A LOT
MOVING FROM LYING ON BACK TO SITTING ON SIDE OF FLAT BED WITH BEDRAILS: A LOT
CLIMB 3 TO 5 STEPS WITH RAILING: A LOT
MOBILITY SCORE: 12
EATING MEALS: A LOT
MOVING FROM LYING ON BACK TO SITTING ON SIDE OF FLAT BED WITH BEDRAILS: A LOT
DAILY ACTIVITIY SCORE: 16
TOILETING: A LITTLE
DRESSING REGULAR UPPER BODY CLOTHING: A LOT
DRESSING REGULAR LOWER BODY CLOTHING: A LOT
STANDING UP FROM CHAIR USING ARMS: A LOT
DAILY ACTIVITIY SCORE: 14
DRESSING REGULAR LOWER BODY CLOTHING: A LOT
PERSONAL GROOMING: A LOT
MOBILITY SCORE: 24
CLIMB 3 TO 5 STEPS WITH RAILING: TOTAL
PERSONAL GROOMING: A LITTLE
TOILETING: A LOT
MOVING FROM LYING ON BACK TO SITTING ON SIDE OF FLAT BED WITH BEDRAILS: A LITTLE
TOILETING: A LOT
DRESSING REGULAR UPPER BODY CLOTHING: A LOT
WALKING IN HOSPITAL ROOM: A LOT
CLIMB 3 TO 5 STEPS WITH RAILING: A LOT
PERSONAL GROOMING: A LOT

## 2024-03-28 ASSESSMENT — PAIN - FUNCTIONAL ASSESSMENT
PAIN_FUNCTIONAL_ASSESSMENT: 0-10

## 2024-03-28 ASSESSMENT — PAIN SCALES - GENERAL
PAINLEVEL_OUTOF10: 4
PAINLEVEL_OUTOF10: 7
PAINLEVEL_OUTOF10: 5 - MODERATE PAIN
PAINLEVEL_OUTOF10: 5 - MODERATE PAIN
PAINLEVEL_OUTOF10: 4
PAINLEVEL_OUTOF10: 0 - NO PAIN
PAINLEVEL_OUTOF10: 4
PAINLEVEL_OUTOF10: 4

## 2024-03-28 ASSESSMENT — LIFESTYLE VARIABLES
HOW OFTEN DO YOU HAVE 6 OR MORE DRINKS ON ONE OCCASION: LESS THAN MONTHLY
SKIP TO QUESTIONS 9-10: 0
AUDIT-C TOTAL SCORE: -1
HOW OFTEN DO YOU HAVE A DRINK CONTAINING ALCOHOL: PATIENT DECLINED
AUDIT-C TOTAL SCORE: -1
HOW MANY STANDARD DRINKS CONTAINING ALCOHOL DO YOU HAVE ON A TYPICAL DAY: PATIENT DOES NOT DRINK

## 2024-03-28 ASSESSMENT — ACTIVITIES OF DAILY LIVING (ADL)
LACK_OF_TRANSPORTATION: NO
LACK_OF_TRANSPORTATION: NO
BATHING: INDEPENDENT
ADLS_ADDRESSED: NO
ADEQUATE_TO_COMPLETE_ADL: YES
PATIENT'S MEMORY ADEQUATE TO SAFELY COMPLETE DAILY ACTIVITIES?: YES
WALKS IN HOME: INDEPENDENT
ADL_ASSISTANCE: INDEPENDENT
TOILETING: INDEPENDENT
FEEDING YOURSELF: INDEPENDENT
HEARING - LEFT EAR: FUNCTIONAL
DRESSING YOURSELF: INDEPENDENT
GROOMING: INDEPENDENT
JUDGMENT_ADEQUATE_SAFELY_COMPLETE_DAILY_ACTIVITIES: YES
HEARING - RIGHT EAR: FUNCTIONAL

## 2024-03-28 ASSESSMENT — PAIN DESCRIPTION - LOCATION
LOCATION: ARM

## 2024-03-28 ASSESSMENT — PAIN DESCRIPTION - DESCRIPTORS
DESCRIPTORS: SHARP
DESCRIPTORS: SHARP
DESCRIPTORS: PRESSURE
DESCRIPTORS: SHARP

## 2024-03-28 ASSESSMENT — PATIENT HEALTH QUESTIONNAIRE - PHQ9
2. FEELING DOWN, DEPRESSED OR HOPELESS: NOT AT ALL
SUM OF ALL RESPONSES TO PHQ9 QUESTIONS 1 & 2: 0
1. LITTLE INTEREST OR PLEASURE IN DOING THINGS: NOT AT ALL

## 2024-03-28 ASSESSMENT — PAIN DESCRIPTION - ORIENTATION: ORIENTATION: LEFT;RIGHT

## 2024-03-28 NOTE — PROGRESS NOTES
Pharmacy Medication History Review    Cheyanne Rubio is a 53 y.o. female admitted for Encounter for breast reconstruction following mastectomy. Pharmacy reviewed the patient's [id number] medications and allergies for accuracy.    The list below reflects the updated PTA list. Comments regarding how patient may be taking medications differently can be found in the Admit Orders Activity  Prior to Admission Medications   Prescriptions Last Dose Informant   PNV no.95/ferrous fum/folic ac (PRENATAL ORAL)  Self   Sig: Take 1 tablet by mouth once daily.   UNABLE TO FIND  Self   Sig: Take 1 tablet by mouth once daily. Med Name: Viviscal (hair growth supplement)   acetaminophen (Tylenol) 500 mg tablet 3/26/2024 Self   Sig: Take 1 tablet (500 mg) by mouth every 6 hours if needed for mild pain (1 - 3).   aspirin 81 mg EC tablet  Self   Sig: Take 1 tablet (81 mg) by mouth once daily.   brimonidine 0.025 % drops Past Week Self, Spouse/Significant Other   Sig: Administer 1 drop into affected eye(s) 2 times a day as needed (red eye).   calcium carbonate (CALTRATE 600 ORAL)  Self   Sig: Take 1 tablet by mouth once daily.   cholecalciferol (Vitamin D3) 25 MCG (1000 UT) capsule  Self   Sig: Take 1 capsule (25 mcg) by mouth once daily.   fexofenadine (Allegra) 180 mg tablet Past Week Self, Spouse/Significant Other   Sig: Take 1 tablet (180 mg) by mouth once daily.   gabapentin (Neurontin) 300 mg capsule More than a month    Sig: Take 1 capsule (300 mg) by mouth 2 times a day.   Patient not taking: Reported on 3/27/2024   meloxicam (Mobic) 7.5 mg tablet Not Taking Self   Sig: Take 1 tablet (7.5 mg) by mouth once daily.   Patient not taking: Reported on 3/27/2024   methocarbamol (Robaxin-750) 750 mg tablet Not Taking    Sig: Take 1 tablet (750 mg) by mouth 4 times a day for 10 days.   Patient not taking: Reported on 3/28/2024   methocarbamol (Robaxin-750) 750 mg tablet Not Taking    Sig: Take 1 tablet (750 mg) by mouth 4 times a  day.   Patient not taking: Reported on 3/28/2024   methocarbamol (Robaxin-750) 750 mg tablet  Self   Sig: Take 1 tablet (750 mg) by mouth 3 times a day.      Facility-Administered Medications: None       The list below reflects the updated allergy list. Please review each documented allergy for additional clarification and justification.  Allergies  Reviewed by Santiago Keith RN on 3/28/2024        Severity Reactions Comments    Oxycodone Medium Confusion     Codeine-guaifenesin Not Specified Other     Nitro Transdermal Not Specified Other Pt stated she has a lot of nausea when cream applied    Codeine Low Nausea/vomiting, Rash, Other             Patient accepts M2B at discharge. Pharmacy has been updated to [facility].    Sources used to complete the med history include out patient fill history, OARRS, and patient interview   Reliable historian    Below are additional concerns with the patient's PTA list.  N/A    Jaren Echols PharmD  Transitions of Care Pharmacist  [facility]  Please reach out via Secure Chat for questions, or if no response call [facility] or [facility]

## 2024-03-28 NOTE — PROGRESS NOTES
Acute Pain Service    Cheyanne Rubio is a 53 y.o. year old female patient who presents for breast reconstruction revision w/ free flap bilateral dermatolipectomy abdomen excision lesion skin torso repair torso dermatolipectomy upper extremity bilateral with Dr. Gorman. Acute Pain consulted for block for postoperative pain control.     Postop Pain HPI -   Palliative: relieved with IV analgesics and regional local anesthetics  Provocative: movement  Quality:  burning and aching  Radiation:  none  Severity:  denies chest pain. Endorses arm pain   Timing: constant    24-HOUR OPIOID CONSUMPTION:  Dilaudid 0.2mg x1  Dilaudid 0.4mg x1     Scheduled medications  acetaminophen, 650 mg, oral, q6h ADY  ceFAZolin, 2 g, intravenous, q8h  docusate sodium, 100 mg, oral, BID  enoxaparin, 40 mg, subcutaneous, q24h  gabapentin, 300 mg, oral, TID  ketorolac, 30 mg, intravenous, q6h ADY  methocarbamol, 1,000 mg, intravenous, q8h  traMADol, 50 mg, oral, q6h      Continuous medications  ropivacaine (PF) in NS cmpd, 14 mL/hr, Last Rate: 14 mL/hr (03/28/24 1141)      PRN medications  PRN medications: diphenhydrAMINE, HYDROmorphone, magnesium hydroxide, ondansetron **OR** ondansetron     Physical Exam:  Constitutional:  no distress, alert and cooperative  Eyes: clear sclera  Head/Neck: No apparent injury, trachea midline  Respiratory/Thorax: Patent airways, thorax symmetric, breathing comfortably  Cardiovascular: no pitting edema  Gastrointestinal: Nondistended  Musculoskeletal: ROM intact  Extremities: no clubbing  Neurological: alert, perez x4  Psychological: Appropriate affect    Results for orders placed or performed during the hospital encounter of 03/27/24 (from the past 24 hour(s))   CBC   Result Value Ref Range    WBC 14.6 (H) 4.4 - 11.3 x10*3/uL    nRBC 0.0 0.0 - 0.0 /100 WBCs    RBC 3.65 (L) 4.00 - 5.20 x10*6/uL    Hemoglobin 11.1 (L) 12.0 - 16.0 g/dL    Hematocrit 32.5 (L) 36.0 - 46.0 %    MCV 89 80 - 100 fL    MCH 30.4 26.0 - 34.0  pg    MCHC 34.2 32.0 - 36.0 g/dL    RDW 13.4 11.5 - 14.5 %    Platelets 153 150 - 450 x10*3/uL   Renal function panel   Result Value Ref Range    Glucose 162 (H) 74 - 99 mg/dL    Sodium 136 136 - 145 mmol/L    Potassium 6.9 (HH) 3.5 - 5.3 mmol/L    Chloride 102 98 - 107 mmol/L    Bicarbonate 23 21 - 32 mmol/L    Anion Gap 18 10 - 20 mmol/L    Urea Nitrogen 9 6 - 23 mg/dL    Creatinine 0.78 0.50 - 1.05 mg/dL    eGFR >90 >60 mL/min/1.73m*2    Calcium 9.1 8.6 - 10.6 mg/dL    Phosphorus 4.4 2.5 - 4.9 mg/dL    Albumin 4.7 3.4 - 5.0 g/dL   Magnesium   Result Value Ref Range    Magnesium 1.89 1.60 - 2.40 mg/dL   Potassium   Result Value Ref Range    Potassium 4.2 3.5 - 5.3 mmol/L   CBC   Result Value Ref Range    WBC 10.1 4.4 - 11.3 x10*3/uL    nRBC 0.0 0.0 - 0.0 /100 WBCs    RBC 2.68 (L) 4.00 - 5.20 x10*6/uL    Hemoglobin 8.1 (L) 12.0 - 16.0 g/dL    Hematocrit 25.2 (L) 36.0 - 46.0 %    MCV 94 80 - 100 fL    MCH 30.2 26.0 - 34.0 pg    MCHC 32.1 32.0 - 36.0 g/dL    RDW 13.3 11.5 - 14.5 %    Platelets 198 150 - 450 x10*3/uL   Renal function panel   Result Value Ref Range    Glucose 92 74 - 99 mg/dL    Sodium 137 136 - 145 mmol/L    Potassium 3.9 3.5 - 5.3 mmol/L    Chloride 105 98 - 107 mmol/L    Bicarbonate 23 21 - 32 mmol/L    Anion Gap 13 10 - 20 mmol/L    Urea Nitrogen 10 6 - 23 mg/dL    Creatinine 0.85 0.50 - 1.05 mg/dL    eGFR 83 >60 mL/min/1.73m*2    Calcium 8.4 (L) 8.6 - 10.6 mg/dL    Phosphorus 4.2 2.5 - 4.9 mg/dL    Albumin 3.5 3.4 - 5.0 g/dL        Cheyanne Rubio is a 53 y.o. year old female patient who presents for breast reconstruction revision w/ free flap bilateral dermatolipectomy abdomen excision lesion skin torso repair torso dermatolipectomy upper extremity bilateral with Dr. Gorman. Acute Pain consulted for block for postoperative pain control.      Plan:     - Bilateral paravertebral T4 blocks with catheters performed preoperatively on 3/27/24  - Ambit pump with Ropivacaine 0.2%/NaCl 0.9% 500mL, Rate 7  cc/hr bilaterally  - Ambit medication will not interfere with pain medication prescribed by the primary team.   - Plan to dc catheters tomorrow. Continue running existing ropivacaine bag until runs out  - Please hold tonights lovenox dose so that we may dc catheters in the am.   - Please be aware of local anesthetic toxic dose and absorption variability before considering lidocaine patches  - Acute pain service will follow while catheters in place  - Rest of pain management per primary team    Acute Pain Resident  pg [phone] ph [phone]

## 2024-03-28 NOTE — CARE PLAN
The patient's goals for the shift include      The clinical goals for the shift include pain control    Problem: Pain - Adult  Goal: Verbalizes/displays adequate comfort level or baseline comfort level  Outcome: Progressing     Problem: Safety - Adult  Goal: Free from fall injury  Outcome: Progressing     Problem: Skin  Goal: Decreased wound size/increased tissue granulation at next dressing change  Outcome: Progressing  Goal: Participates in plan/prevention/treatment measures  Outcome: Progressing  Goal: Prevent/manage excess moisture  Outcome: Progressing  Goal: Prevent/minimize sheer/friction injuries  Outcome: Progressing  Goal: Promote/optimize nutrition  Outcome: Progressing  Goal: Promote skin healing  Outcome: Progressing

## 2024-03-28 NOTE — PROGRESS NOTES
Pt reports that she is doing well upon meeting at the bedside.  Pt currently having pain control placed and adod 3/30/24, Home with no needs, will continue to monitor for developing needs.

## 2024-03-28 NOTE — CARE PLAN
Problem: Pain - Adult  Goal: Verbalizes/displays adequate comfort level or baseline comfort level  Outcome: Progressing     Problem: Safety - Adult  Goal: Free from fall injury  Outcome: Progressing     Problem: Discharge Planning  Goal: Discharge to home or other facility with appropriate resources  Outcome: Progressing     Problem: Chronic Conditions and Co-morbidities  Goal: Patient's chronic conditions and co-morbidity symptoms are monitored and maintained or improved  Outcome: Progressing     Problem: Pain  Goal: Takes deep breaths with improved pain control throughout the shift  Outcome: Progressing  Goal: Turns in bed with improved pain control throughout the shift  Outcome: Progressing  Goal: Walks with improved pain control throughout the shift  Outcome: Progressing  Goal: Performs ADL's with improved pain control throughout shift  Outcome: Progressing  Goal: Participates in PT with improved pain control throughout the shift  Outcome: Progressing  Goal: Free from opioid side effects throughout the shift  Outcome: Progressing  Goal: Free from acute confusion related to pain meds throughout the shift  Outcome: Progressing     Problem: Skin  Goal: Decreased wound size/increased tissue granulation at next dressing change  Outcome: Progressing  Goal: Participates in plan/prevention/treatment measures  Outcome: Progressing  Goal: Prevent/manage excess moisture  Outcome: Progressing  Goal: Prevent/minimize sheer/friction injuries  Outcome: Progressing  Goal: Promote/optimize nutrition  Outcome: Progressing  Goal: Promote skin healing  Outcome: Progressing

## 2024-03-28 NOTE — PROGRESS NOTES
Physical Therapy    Physical Therapy Evaluation & Treatment    Patient Name: Cheyanne Rubio  MRN: [id number]  Today's Date: 3/28/2024   Time Calculation  Start Time: 1340  Stop Time: 1418  Time Calculation (min): 38 min    Assessment/Plan   PT Assessment  PT Assessment Results: Decreased strength, Decreased range of motion, Decreased endurance, Impaired balance, Decreased mobility, Pain  Rehab Prognosis: Good  Barriers to Discharge: None  Evaluation/Treatment Tolerance: Patient limited by fatigue, Patient limited by pain  Medical Staff Made Aware: Yes  Strengths: Ability to acquire knowledge, Attitude of self, Support of Caregivers  Barriers to Participation:  (None)  End of Session Communication: Bedside nurse  Assessment Comment: Pt requires min A for bed mobility, transfers, and bed mobility. Pt requires additional assist to manage lines. Overall pt demonstrates good functional LE strength, but is limited by pain. Recommend d/c home with no PT needs.  End of Session Patient Position: Bed, 3 rail up, Alarm off, caregiver present   IP OR SWING BED PT PLAN  Inpatient or Swing Bed: Inpatient  PT Plan  Treatment/Interventions: Bed mobility, Transfer training, Gait training, Stair training, Neuromuscular re-education, Balance training, Strengthening, Endurance training, Range of motion, Therapeutic exercise, Therapeutic activity  PT Plan: Skilled PT  PT Frequency: 3 times per week  PT Discharge Recommendations: No PT needed after discharge  PT Recommended Transfer Status: Assist x1  PT - OK to Discharge: Yes    Subjective     General Visit Information:  General  Reason for Referral: S/p bilateral brachioplasty, bilateral breast revision with lateral chest liposuction and suction assisted lipectomy and skin excision of the lower abdomen on 3/27  Past Medical History Relevant to Rehab: Pathogenic PALB2 gene mutation s/p bilateral mastectomy with immediate TRAM reconstruction of abdominal wall with mesh on 11/10/23  Missed  Visit: No  Family/Caregiver Present: Yes ()  Caregiver Feedback:  present and participated in PT evaluation  Co-Treatment: OT  Co-Treatment Reason: Co-evaluation performed to maximize safety of patient.  Prior to Session Communication: Bedside nurse  Patient Position Received: Bed, 3 rail up, Alarm off, not on at start of session  Preferred Learning Style: auditory, verbal, visual, written  General Comment: Pt pleasant and cooperative throughout session  Home Living:  Home Living  Type of Home: House  Lives With: Significant other (Dog)  Home Adaptive Equipment: Walker rolling or standard  Home Layout: Two level, Stairs to alternate level with rails  Alternate Level Stairs-Rails: Left (HR on both sides group home up)  Alternate Level Stairs-Number of Steps: 12  Home Access: Stairs to enter without rails  Entrance Stairs-Rails: None  Entrance Stairs-Number of Steps: 2  Bathroom Shower/Tub: Walk-in shower  Bathroom Toilet: Standard  Bathroom Equipment: Grab bars in shower, Shower chair without back  Prior Level of Function:  Prior Function Per Pt/Caregiver Report  Level of [place]: Independent with ADLs and functional transfers, Independent with homemaking with ambulation  Receives Help From: Family  ADL Assistance: Independent  Homemaking Assistance: Independent  Ambulatory Assistance: Independent  Vocational: Full time employment  Leisure: Travelling, hiking with dog  Hand Dominance: Right  Prior Function Comments: Pt independent in home and community without an assistive device.  Precautions:  Precautions  Hearing/Visual Limitations: Hearing and vision WFL with glasses  UE Weight Bearing Status:  (no lifting > 5 lbs., no using body weight to push off of the bed, no lifting arms above the head, no arm/elbow movement above the level of the shoulders)  Medical Precautions: Fall precautions  Post-Surgical Precautions: Abdominal surgery precautions  Precautions Comment: Pt compliant with precautions  throughout session.  Vital Signs:  Vital Signs  Heart Rate:  (Pre: 76, Sittin, Standin)  Heart Rate Source: Monitor  SpO2:  (Pre: 96, Sittin, Standin)  BP:  (Pre: 122/67, Sittin/85, Standin/80)  BP Location: Left leg  BP Method: Automatic    Objective   Pain:  Pain Assessment  Pain Assessment: 0-10  Pain Score: 7  Pain Type: Surgical pain  Pain Location: Arm  Pain Orientation: Left, Right  Pain Interventions: Ambulation/increased activity  Response to Interventions: Increased with movement, especially noted on R side during supine > sit.  Cognition:  Cognition  Overall Cognitive Status: Within Functional Limits    General Assessments:  Activity Tolerance  Endurance: Decreased tolerance for upright activites, Tolerates less than 10 min exercise with changes in vital signs    Sensation  Light Touch: Severe deficits in the LUE, Severe deficits in the RUE (Unable to feel hands)    Strength  Strength Comments: BLE WFL, BUE deficits  Postural Control  Postural Control: Within Functional Limits  Head Control: WFL  Trunk Control: Slight forward flexed posture  Posture Comment: Sitting posture WFL, standing posture slightly impaired without AD    Static Sitting Balance  Static Sitting-Balance Support: Bilateral upper extremity supported, Feet supported  Static Sitting-Level of Assistance: Close supervision  Static Sitting-Comment/Number of Minutes: ~5 minutes  Dynamic Sitting Balance  Dynamic Sitting-Balance Support: Bilateral upper extremity supported, Feet supported  Dynamic Sitting-Balance: Lateral lean, Forward lean  Dynamic Sitting-Comments: CGA    Static Standing Balance  Static Standing-Balance Support: No upper extremity supported  Static Standing-Level of Assistance: Minimum assistance  Static Standing-Comment/Number of Minutes: 1-2 minutes  Dynamic Standing Balance  Dynamic Standing-Balance Support: No upper extremity supported  Dynamic Standing-Balance: Lateral lean  Dynamic  Standing-Comments: Min A  Functional Assessments:  Bed Mobility  Bed Mobility: Yes  Bed Mobility 1  Bed Mobility 1: Supine to sitting  Level of Assistance 1: Minimum assistance, Minimal verbal cues  Bed Mobility Comments 1: HOB elevated, assist at trunk, cues for LE placement and to maintain breath. Onset of dizziness with mobility, decreased with breathing and increased time to tolerate new position.  Bed Mobility 2  Bed Mobility  2: Sitting to supine  Level of Assistance 2: Minimum assistance  Bed Mobility Comments 2: Assist for breathing, assist at LE and trunk    Transfers  Transfer: Yes  Transfer 1  Transfer From 1: Sit to  Transfer to 1: Stand  Technique 1: Sit to stand  Transfer Level of Assistance 1: Minimum assistance, +1 to manage equipment  Trials/Comments 1: Onset of dizziness with transition, decreased with breathing  Transfers 2  Transfer From 2: Stand to  Transfer to 2: Sit  Technique 2: Stand to sit  Transfer Level of Assistance 2: Minimum assistance, +1 to manage equipment, Moderate verbal cues  Trials/Comments 2: Cues for slowing down    Ambulation/Gait Training  Ambulation/Gait Training Performed: Yes  Ambulation/Gait Training 1  Surface 1: Level tile  Device 1: No device  Assistance 1: Minimum assistance  Quality of Gait 1: Inconsistent stride length, Decreased step length, Forward flexed posture (Decreased endurance, slightly unsteady, decreased joel)  Comments/Distance (ft) 1: 2' L side steps  Extremity/Trunk Assessments:  Cervical Spine   Cervical Spine: Within Functional Limits  Lumbar Spine   Lumbar Spine : Exceptions to Funtional Limits  Lumbar Spine Comment: Decreased strength and mobility    RUE   RUE : Exceptions to WFL  RUE AROM (degrees)  RUE AROM Comment: Unable to assess shoulder P/AROM d/t precautions. Elbow/wrist P/AROM WFL  RUE Strength  RUE Overall Strength: Deficits, Unable to assess (Due to precautions)  LUE   LUE: Exceptions to WFL  LUE AROM (degrees)  LUE AROM Comment:  Unable to assess shoulder P/AROM d/t precautions. Elbow/wrist P/AROM WFL  LUE Strength  LUE Overall Strength: Deficits, Unable to assess (Due to precautions)  RLE   RLE : Exceptions to WFL  AROM RLE (degrees)  RLE AROM Comment: WFL  Strength RLE  RLE Overall Strength: Greater than or equal to 3/5 as evidenced by functional mobility  LLE   LLE : Exceptions to WFL  AROM LLE (degrees)  LLE AROM Comment: WFL  Strength LLE  LLE Overall Strength: Greater than or equal to 3/5 as evidenced by functional mobility  Treatments:  Bed Mobility  Bed Mobility: Yes  Bed Mobility 1  Bed Mobility 1: Supine to sitting  Level of Assistance 1: Minimum assistance, Minimal verbal cues  Bed Mobility Comments 1: HOB elevated, assist at trunk, cues for LE placement and to maintain breath. Onset of dizziness with mobility, decreased with breathing and increased time to tolerate new position.  Bed Mobility 2  Bed Mobility  2: Sitting to supine  Level of Assistance 2: Minimum assistance  Bed Mobility Comments 2: Assist for breathing, assist at LE and trunk    Ambulation/Gait Training  Ambulation/Gait Training Performed: Yes  Ambulation/Gait Training 1  Surface 1: Level tile  Device 1: No device  Assistance 1: Minimum assistance  Quality of Gait 1: Inconsistent stride length, Decreased step length, Forward flexed posture (Decreased endurance, slightly unsteady, decreased joel)  Comments/Distance (ft) 1: 2' L side steps  Transfers  Transfer: Yes  Transfer 1  Transfer From 1: Sit to  Transfer to 1: Stand  Technique 1: Sit to stand  Transfer Level of Assistance 1: Minimum assistance, +1 to manage equipment  Trials/Comments 1: Onset of dizziness with transition, decreased with breathing  Transfers 2  Transfer From 2: Stand to  Transfer to 2: Sit  Technique 2: Stand to sit  Transfer Level of Assistance 2: Minimum assistance, +1 to manage equipment, Moderate verbal cues  Trials/Comments 2: Cues for slowing down  Outcome Measures:  [facility] Basic  Mobility  Turning from your back to your side while in a flat bed without using bedrails: A little  Moving from lying on your back to sitting on the side of a flat bed without using bedrails: A little  Moving to and from bed to chair (including a wheelchair): A little  Standing up from a chair using your arms (e.g. wheelchair or bedside chair): A little  To walk in hospital room: A lot  Climbing 3-5 steps with railing: Total  Basic Mobility - Total Score: 15    Encounter Problems       Encounter Problems (Active)       Balance       STG - Maintains independent dynamic standing balance without upper extremity support. (Progressing)       Start:  03/28/24    Expected End:  04/11/24            STG - Maintains independent static standing balance without upper extremity support. (Progressing)       Start:  03/28/24    Expected End:  04/11/24               Mobility       STG - Patient will ambulate 150' mod independently with a wheeled walker.  (Progressing)       Start:  03/28/24    Expected End:  04/11/24            STG - Patient will ascend and descend a flight of stairs with min A and use of 1 HR.  (Progressing)       Start:  03/28/24    Expected End:  04/11/24               PT Transfers       STG - Transfer from bed to chair mod independently with a wheeled walker. (Progressing)       Start:  03/28/24    Expected End:  04/11/24            STG - Patient will perform bed mobility independently. (Progressing)       Start:  03/28/24    Expected End:  04/11/24            STG - Patient will transfer sit to and from stand mod independently with a wheeled walker.  (Progressing)       Start:  03/28/24    Expected End:  04/11/24               Pain - Adult              Education Documentation  Handouts, taught by MARITA Franco at 3/28/2024  2:41 PM.  Learner: Significant Other, Patient  Readiness: Eager  Method: Explanation, Demonstration, Handout  Response: Verbalizes Understanding, Demonstrated  Understanding    Precautions, taught by MARITA Franco at 3/28/2024  2:41 PM.  Learner: Significant Other, Patient  Readiness: Eager  Method: Explanation, Demonstration, Handout  Response: Verbalizes Understanding, Demonstrated Understanding    Body Mechanics, taught by MARITA Franco at 3/28/2024  2:41 PM.  Learner: Significant Other, Patient  Readiness: Eager  Method: Explanation, Demonstration, Handout  Response: Verbalizes Understanding, Demonstrated Understanding    Home Exercise Program, taught by MARITA Franco at 3/28/2024  2:41 PM.  Learner: Significant Other, Patient  Readiness: Eager  Method: Explanation, Demonstration, Handout  Response: Verbalizes Understanding, Demonstrated Understanding    Mobility Training, taught by MARITA Franco at 3/28/2024  2:41 PM.  Learner: Significant Other, Patient  Readiness: Eager  Method: Explanation, Demonstration, Handout  Response: Verbalizes Understanding, Demonstrated Understanding    Education Comments  No comments found.

## 2024-03-28 NOTE — PROGRESS NOTES
Occupational Therapy    Evaluation    Patient Name: Cheyanne Rubio  MRN: [id number]  Today's Date: 3/28/2024  Time Calculation  Start Time: 1405  Stop Time: 1425  Time Calculation (min): 20 min    Assessment  IP OT Assessment  OT Assessment: difficulty I/ADLs, safety, fxnl mob  Prognosis: Good  Barriers to Discharge: None  Evaluation/Treatment Tolerance: Patient limited by fatigue, Patient limited by pain  Medical Staff Made Aware: Yes  End of Session Communication: Bedside nurse (present in session)  End of Session Patient Position: Bed, 3 rail up, Alarm off, caregiver present  Plan:  Treatment Interventions: ADL retraining, Functional transfer training, UE strengthening/ROM, Endurance training, Patient/family training, Equipment evaluation/education, Compensatory technique education, Fine motor coordination activities  OT Frequency: 2 times per week  OT Discharge Recommendations: No OT needed after discharge (OP OT as needed once cleared by MD for ROM and BUE FM)  Equipment Recommended upon Discharge:  (hip kit)  OT Recommended Transfer Status: Assist of 1  OT - OK to Discharge: Yes    Subjective   Current Problem:  1. Encounter for breast reconstruction following mastectomy          General:  General  Reason for Referral: s/p bilateral brachioplasty, bilateral breast revision with lateral chest liposuction and suction assisted lipectomy and skin excision of the lower abdomen  Past Medical History Relevant to Rehab: pathogenic PALB2 gene mutation s/p bilateral mastectomy with immediate TRAM reconstruction and reconstruction of abdominal wall with mesh on 11/10/23.  Missed Visit: Yes  Missed Visit Reason: Patient refused (nausea, asked to come back later re att time permitting)  Family/Caregiver Present: Yes  Co-Treatment: PT  Co-Treatment Reason: maximize pt safety  Prior to Session Communication: Bedside nurse  Patient Position Received: Bed, 3 rail up, Alarm off, caregiver present  General Comment: OT in room 8:50 am  educated pt and  on prec, no pushing, pulling, lifting >5#, donning sx bra in front and rotating around as needed, showering seated when cleared feet to self sponge bathing till cleared, donning UBD below shoulder height  Precautions:  UE Weight Bearing Status:  (Upper extremity activity restrictions: No lifting >5lbs        No using body weight to push off of the bed. No lifting arms above the head. No arm/elbow movement above the level of the shoulders)  Medical Precautions: Abdominal precautions, Fall precautions  Vital Signs:  Heart Rate: 74  SpO2: 94 %  BP: 159/85  BP Location: Right leg  BP Method: Automatic  Patient Position: Lying  Pain:  Pain Assessment  Pain Assessment: 0-10  Pain Score: 4  Pain Location:  (chest, BUE)    Objective   Cognition:  Overall Cognitive Status: Within Functional Limits  Orientation Level: Oriented X4  Safety Judgment: Good awareness of safety precautions           Home Living:  Type of Home: House  Lives With: Spouse  Home Adaptive Equipment:  (WhW, shower chair, suction grab bar)  Home Layout:  (2 KATIE, flight to bed/bathroom with walkin shower, lift chair 1st floor)  Bathroom Shower/Tub: Walk-in shower  Bathroom Toilet: Standard   Prior Function:  Level of [place]: Independent with ADLs and functional transfers, Independent with homemaking with ambulation ( A post 1st sx)  Ambulatory Assistance: Independent  Vocational: Full time employment  Leisure: Travelling, hiking with dog  IADL History:  IADL Comments: I I/ADLs,  A post sx, +drives, +dog  ADL:  Eating Assistance:  (drank min-mod A 2/2 N/T hands)  Grooming Assistance:  (min  A anticipated standing)  Bathing Assistance:  (mod A anticipated)  UE Dressing Assistance: Moderate  LE Dressing Assistance: Moderate  Toileting Assistance with Device:  (min  A anticipated)  Functional Deficit: Setup, Steadying, Verbal cueing, Supervision/safety, Increased time to complete  Activity Tolerance:  Endurance:   (good, limited this date 2/2 pain and dizziness)  Bed Mobility/Transfers: Bed Mobility  Bed Mobility: Yes  Bed Mobility 1  Level of Assistance 1:  (sup to sit min A log roll, boost in bed max A x2)    Transfers  Transfer: Yes  Transfer 1  Transfer Level of Assistance 1:  (sit/stand min A HHA, A lines)      Functional Mobility:  Functional Mobility  Functional Mobility Performed:  (pt performed 3 side steps L HOB min A)  Sitting Balance:  Static Sitting Balance  Static Sitting-Level of Assistance: Close supervision  Standing Balance:  Dynamic Standing Balance  Dynamic Standing-Balance:  (min A HHA)       IADL's:   IADL Comments: I I/ADLs,  A post sx, +drives, +dog  Vision: Vision - Basic Assessment  Current Vision:  (+glasses)  Sensation:  Light Touch:  (N/T BUE and hands)  Strength:  Strength Comments: B elbows 3+/5,  3-/5 2/2 edema and n/t       Coordination:  Movements are Fluid and Coordinated: No   Hand Function:  Hand Function  Gross Grasp: Impaired  Extremities: RUE   RUE :  (AAROM shoulder flex ~0-80 degrees, elbow WFL,  1/2 ROM) and LUE   LUE:  (AAROM shoulder flex ~0-80 degrees, elbow WFL,  1/2 ROM)      Outcome Measures: [facility] Daily Activity  Putting on and taking off regular lower body clothing: A lot  Bathing (including washing, rinsing, drying): A lot  Putting on and taking off regular upper body clothing: A little  Toileting, which includes using toilet, bedpan or urinal: A little  Taking care of personal grooming such as brushing teeth: A little  Eating Meals: A little  Daily Activity - Total Score: 16         and OT Adult Other Outcome Measures  4AT: -  Education Documentation  Body Mechanics, taught by Arti Tony OT at 3/28/2024  2:50 PM.  Learner: Significant Other, Patient  Readiness: Acceptance  Method: Explanation  Response: Verbalizes Understanding, Needs Reinforcement    Precautions, taught by Arti Tony OT at 3/28/2024  2:50 PM.  Learner: Significant Other,  Patient  Readiness: Acceptance  Method: Explanation  Response: Verbalizes Understanding, Needs Reinforcement    ADL Training, taught by Arti Tony OT at 3/28/2024  2:50 PM.  Learner: Significant Other, Patient  Readiness: Acceptance  Method: Explanation  Response: Verbalizes Understanding, Needs Reinforcement    Education Comments  No comments found.      Goals:   Encounter Problems       Encounter Problems (Active)       ADLs       Patient will perform UB and LB bathing  with stand by assist level of assistance and AE. (Progressing)       Start:  03/28/24    Expected End:  04/18/24            Patient with complete upper body dressing with modified independent level of assistance donning and doffing all UE clothes with PRN adaptive equipment  (Progressing)       Start:  03/28/24    Expected End:  04/18/24            Patient with complete lower body dressing with modified independent level of assistance donning and doffing all LE clothes  with PRN adaptive equipment  (Progressing)       Start:  03/28/24    Expected End:  04/18/24            Patient will feed self with independent level (Progressing)       Start:  03/28/24    Expected End:  04/18/24            Patient will complete daily grooming tasks  with independent level  (Progressing)       Start:  03/28/24    Expected End:  04/18/24            Patient will complete toileting including hygiene clothing management/hygiene with modified independent level of assistance and LRD. (Progressing)       Start:  03/28/24    Expected End:  04/18/24               COGNITION/SAFETY       Patient will recall and adhere to precautions during all functional mobility/ADL tasks in order to demonstrate improved understanding and promote healing post op (Progressing)       Start:  03/28/24    Expected End:  04/18/24               EXERCISE/STRENGTHENING       Patient with increase BUE to WFL strength. (Progressing)       Start:  03/28/24    Expected End:  04/18/24                MOBILITY       Patient will perform Functional mobility max Household distances/Community Distances with modified independent level of assistance and least restrictive device in order to improve safety and functional mobility. (Progressing)       Start:  03/28/24    Expected End:  04/18/24               TRANSFERS       Patient will perform bed mobility stand by assist level of assistance and bed rails in order to improve safety and independence with mobility (Progressing)       Start:  03/28/24    Expected End:  04/18/24

## 2024-03-28 NOTE — PROGRESS NOTES
Department of Plastic and Reconstructive Surgery  Post Op Check    Patient Name: Cheyanne Rubio  MRN: [id number]  Date:  03/27/24     Subjective  Pt resting in bed on assessment. Primary complaint of pain in the LUE, described as a stabbing burning pain. Also endorses numbness/tingling of the LUE but states she has had that since her initial surgery in November. Able to  her fingers and flex at the elbow. Sensation intact. Pain at other surgical sites well controlled. Denies nausea, vomiting. Denies fevers, chills, CP, SOB, palpitations, N/V/D.     Objective    Vital Signs  /72   Pulse 68   Temp 36.1 °C (97 °F) (Temporal)   Resp 15   SpO2 98%      Physical Exam   Physical Exam:   Constitutional: A&Ox3, calm and cooperative, NAD  Eyes: PERRL, clear sclera   ENMT: Moist mucous membranes, no apparent injuries or lesions  Head/Neck: Neck supple, no JVD  Cardiovascular: Normal rate and regular rhythm, 2+ equal pulses of the distal extremities  Respiratory/Thorax: CTAB, regular respirations on RA, good symmetric chest expansion  Gastrointestinal: Abdomen soft, appropriately tender post operatively. Bilateral flank incisions with skin glue, C/D/I  Breasts: Bilateral breasts soft, warm to touch. Incisions with skin glue, C/D/I. Jpx1 to bilateral breasts with SS output.   Genitourinary: Voiding via indwelling palomino catheter  Extremities: Prevena incisional wound vacs intact to bilateral upper extremities with no leak or malfunction. Swelling of bilateral upper extremities, L>R. Compartments warm, soft and compressible. Motor and sensory intact. 2+ radial pulses bilaterally. Jpx1 to bilateral UE with SS output.   Neurological: A&Ox3  Psychological: Appropriate mood and behavior  Skin: Warm and dry with no lesions or rashes    Diagnostics   Results for orders placed or performed during the hospital encounter of 03/27/24 (from the past 24 hour(s))   POCT pregnancy, urine manually resulted   Result Value Ref Range     Preg Test, Ur Negative Negative   CBC   Result Value Ref Range    WBC 14.6 (H) 4.4 - 11.3 x10*3/uL    nRBC 0.0 0.0 - 0.0 /100 WBCs    RBC 3.65 (L) 4.00 - 5.20 x10*6/uL    Hemoglobin 11.1 (L) 12.0 - 16.0 g/dL    Hematocrit 32.5 (L) 36.0 - 46.0 %    MCV 89 80 - 100 fL    MCH 30.4 26.0 - 34.0 pg    MCHC 34.2 32.0 - 36.0 g/dL    RDW 13.4 11.5 - 14.5 %    Platelets 153 150 - 450 x10*3/uL     No results found.    Current Medications  Scheduled medications  lidocaine, 0.1 mL, subcutaneous, Once  methocarbamol, 1,000 mg, intravenous, Once  oxygen, , inhalation, Continuous - Inhalation      Continuous medications  lactated Ringer's, 100 mL/hr, Last Rate: 100 mL/hr (03/27/24 1956)  ropivacaine (PF) in NS cmpd, 14 mL/hr, Last Rate: 14 mL/hr (03/27/24 1651)      PRN medications  PRN medications: albuterol, hydrALAZINE, HYDROmorphone, HYDROmorphone, HYDROmorphone, labetaloL, ondansetron, promethazine (Phenergan) 6.25 mg in sodium chloride 0.9% 50 mL IV     Assessment  Cheyanne Rubio is a 53 y.o. female with a PMH of pathogenic PALB2 gene mutation s/p bilateral mastectomy with immediate TRAM reconstruction and reconstruction of abdominal wall with mesh on 11/10/23. She is now s/p bilateral brachioplasty, bilateral breast revision with lateral chest liposuction and suction assisted lipectomy and skin excision of the lower abdomen with Dr. Gorman on 3/27/2024.     Plan/Recommendations  S/p bilateral brachioplasty, bilateral breast revision with lateral chest liposuction and suction assisted lipectomy and skin excision of the lower abdomen  - Maintain incisional wound vacs to bilateral upper extremities      · Settings: -125mmHg low continuous suction       · Notify plastic surgery with any concerns of leak or obstruction      · Monitor/record vac canister output [id number]   - Continue ROBBY drain care per nursing (Jpx4- b/l breasts, b/l arms)       · Empty and record output at least every 8 hours       · Strip drains TID and PRN to avoid  drain obstruction        · Call plastics if drain output is >30cc in an hour or greater than 200cc over 8 hours       · Removal per plastic surgery team when output <30cc output/24hrs for x2 consecutive days   - Maintain surgical bra  - Maintain abdominal binder  - Regular diet   - Ambulate as tolerated, encourage OOB   - Upper extremity activity restrictions:        · No lifting >5lbs       · No using body weight to push off of the bed       · No lifting arms above the head       · No arm/elbow movement above the level of the shoulders  - PT/OT consult in am  - Maintain indwelling palomino catheter, anticipate removal on POD#1  - Post op IV ancef x3 doses  - SQ Lovenox 40mg daily   -Follow up post op labs      · Mild leukocytosis 14.6, likely reactive      · K+ 6.9, likely hemolyzed. Stat K+ re-ordered, will follow   - Record and monitor strict I&Os  - Encourage IS use (10x/hr every hour while awake)  - AM labs    # Acute postoperative pain  - PO Tylenol 650mg q6h   - Gabapentin 300mg q8h  - IV Toradol 30mg q6hr  - IV robaxin 1000mg q8hr  - PO Tramadol 50mg q6hr  - IV dilaudid 0.2mg q4hr PRN for breakthrough  - Ropivacaine ambit pump per acute pain  - Colace 100 mg PO BID for constipation 2/2 narcotic use  - IV Zofran PRN nausea 2/2 narcotics     F: Not currently on IVF, encourage PO intake. Monitor I&O's  E: Replete PRN.   N: Regular diet  GI: Continue bowel regimen with Colace BID. IV Zofran PRN for nausea.     DVT ppx:  - SQ Lovenox 40mg daily  - SCDs  - Encourage OOB/ambulation       Disposition: Continue care on RNF. Will remain inpatient for pain control and post operative monitoring. Anticipate discharge home in 2-3 days.     Patient evaluated and plan discussed with CHRIS RomeroC  Plastic and Reconstructive Surgery   Doc Halo  Pager #[phone]  Team phones: [phone]

## 2024-03-28 NOTE — BRIEF OP NOTE
Date: 3/27/2024  OR Location: [facility] OR    Name: Cheyanne Rubio YOB: 1970, Age: 53 y.o., MRN: [id number], Sex: female    Diagnosis  Pre-op Diagnosis     * Deformity and disproportion of reconstructed breast [N65.0, N65.1]     * Status post bilateral breast reconstruction [Z98.890]     * Excess skin of abdomen [L98.7]     * Excess skin of breast [L98.7]     * Excessive subcutaneous fat [E66.9] Post-op Diagnosis     * Deformity and disproportion of reconstructed breast [N65.0, N65.1]     * Status post bilateral breast reconstruction [Z98.890]     * Excess skin of abdomen [L98.7]     * Excess skin of breast [L98.7]     * Excessive subcutaneous fat [E66.9]     Procedures  Dermatolipectomy Abdomen  22541 - TX SUCTION ASSISTED LIPECTOMY TRUNK    Excision Lesion Skin Torso  71498 - TX EXC B9 LESION MRGN XCP SK TG T/A/L >4.0 CM    Repair Laceration Torso  08125 - TX REPAIR INTERMEDIATE S/A/T/E 20.1-30.0 CM    Dermatolipectomy Upper Extremity  39014 - TX EXCISION EXCESSIVE SKIN & SUBQ TISSUE ARM      Surgeons      * Crystal Gorman - Primary    Resident/Fellow/Other Assistant:  Surgeon(s) and Role:     * Mariza Chacko PA-C - Assisting     * ERIKA Brown - KENAN First Assist    Procedure Summary  Anesthesia: General  ASA: II  Anesthesia Staff: Anesthesiologist: Indio Andres DO; Sunil Berrios MD; Adina Garrett MD  CRNA: OTILIO Bee-CRNA; OTILIO Mckinley-CRNA; OTILIO Santiago-CRNA  Anesthesia Resident: Ace Schaffer DO; Kori Morrison MD  Estimated Blood Loss: 200 mL  Intra-op Medications:   Administrations occurring from 0815 to 1440 on 24:   Medication Name Total Dose   EPINEPHrine (Adrenalin) 5 mL, lidocaine (Xylocaine) 150 mL in sodium chloride 0.9% 5,000 mL syringe 2,062 mL   sodium chloride 0.9 % irrigation solution 2,000 mL              Anesthesia Record               Intraprocedure I/O Totals          Intake    Propofol Drip 0.00 mL    The total  shown is the total volume documented since Anesthesia Start was filed.    LR infusion 3000.00 mL    Total Intake 3000 mL       Output    Urine 610 mL    Est. Blood Loss 200 mL    Total Output 810 mL       Net    Net Volume 2190 mL          Specimen: No specimens collected     Staff:   Circulator: Brooklyn Capellan, RN; Kaley Hall, RN; Arnold Keith, RN  Relief Scrub: Rose Marie Tran RN; Yvette Bell  Scrub Person: Lisa Tompkins          Findings: Bilateral breast asymmetry. Bilateral abdominal dog ears. Bilateral upper extremity excess subcutaneous tissue and skin.    Complications:  None; patient tolerated the procedure well.     Disposition: PACU - hemodynamically stable.  Condition: stable  Specimens Collected: No specimens collected  Attending Attestation: A qualified resident physician was not available.    Crystal Gorman  Phone Number: [phone]

## 2024-03-28 NOTE — PROGRESS NOTES
"  Department of Plastic and Reconstructive Surgery  Daily Progress Note    Patient Name: Cheyanne Rubio  MRN: [id number]  Date:  03/28/24     Subjective  Patient resting comfortably in bed on exam. Pain is significantly improved from yesterday. Stable numbness and tingling s/p procedure in November. Sensation intact. Able to  fingers and flex at the elbow. Denies any fever, chills, night sweats, CP, SOB, palpitations, nausea, vomiting, or abdominal pain/discomfort.      Overnight Events  NAOE    Objective    Vital Signs  /65 (BP Location: Right leg, Patient Position: Lying)   Pulse 75   Temp 37.3 °C (99.2 °F) (Temporal)   Resp 15   Ht 1.727 m (5' 7.99\")   Wt 89.8 kg (198 lb)   SpO2 94%   BMI 30.11 kg/m²      Physical Exam:   Constitutional: A&Ox3, calm and cooperative, NAD  Eyes: PERRL, clear sclera   ENMT: Moist mucous membranes, no apparent injuries or lesions  Head/Neck: Neck supple, no JVD  Cardiovascular: Normal rate and regular rhythm, 2+ equal pulses of the distal extremities  Respiratory/Thorax: CTAB, regular respirations on RA, good symmetric chest expansion  Gastrointestinal: Abdomen soft, appropriately tender post operatively. Bilateral flank incisions with skin glue, C/D/I  Breasts: Bilateral breasts soft, warm to touch. Incisions with skin glue, C/D/I. Jpx1 to bilateral breasts with SS output.   Genitourinary: Voiding via indwelling palomino catheter  Extremities: Prevena incisional wound vacs intact to bilateral upper extremities with no leak or malfunction. Swelling of bilateral upper extremities, L>R. Compartments warm, soft and compressible. Motor and sensory intact. 2+ radial pulses bilaterally. Jpx1 to bilateral UE with SS output.   Neurological: A&Ox3  Psychological: Appropriate mood and behavior  Skin: Warm and dry with no lesions or rashes      Diagnostics   Results for orders placed or performed during the hospital encounter of 03/27/24 (from the past 24 hour(s))   CBC   Result Value " Ref Range    WBC 14.6 (H) 4.4 - 11.3 x10*3/uL    nRBC 0.0 0.0 - 0.0 /100 WBCs    RBC 3.65 (L) 4.00 - 5.20 x10*6/uL    Hemoglobin 11.1 (L) 12.0 - 16.0 g/dL    Hematocrit 32.5 (L) 36.0 - 46.0 %    MCV 89 80 - 100 fL    MCH 30.4 26.0 - 34.0 pg    MCHC 34.2 32.0 - 36.0 g/dL    RDW 13.4 11.5 - 14.5 %    Platelets 153 150 - 450 x10*3/uL   Renal function panel   Result Value Ref Range    Glucose 162 (H) 74 - 99 mg/dL    Sodium 136 136 - 145 mmol/L    Potassium 6.9 (HH) 3.5 - 5.3 mmol/L    Chloride 102 98 - 107 mmol/L    Bicarbonate 23 21 - 32 mmol/L    Anion Gap 18 10 - 20 mmol/L    Urea Nitrogen 9 6 - 23 mg/dL    Creatinine 0.78 0.50 - 1.05 mg/dL    eGFR >90 >60 mL/min/1.73m*2    Calcium 9.1 8.6 - 10.6 mg/dL    Phosphorus 4.4 2.5 - 4.9 mg/dL    Albumin 4.7 3.4 - 5.0 g/dL   Magnesium   Result Value Ref Range    Magnesium 1.89 1.60 - 2.40 mg/dL   Potassium   Result Value Ref Range    Potassium 4.2 3.5 - 5.3 mmol/L   CBC   Result Value Ref Range    WBC 10.1 4.4 - 11.3 x10*3/uL    nRBC 0.0 0.0 - 0.0 /100 WBCs    RBC 2.68 (L) 4.00 - 5.20 x10*6/uL    Hemoglobin 8.1 (L) 12.0 - 16.0 g/dL    Hematocrit 25.2 (L) 36.0 - 46.0 %    MCV 94 80 - 100 fL    MCH 30.2 26.0 - 34.0 pg    MCHC 32.1 32.0 - 36.0 g/dL    RDW 13.3 11.5 - 14.5 %    Platelets 198 150 - 450 x10*3/uL   Renal function panel   Result Value Ref Range    Glucose 92 74 - 99 mg/dL    Sodium 137 136 - 145 mmol/L    Potassium 3.9 3.5 - 5.3 mmol/L    Chloride 105 98 - 107 mmol/L    Bicarbonate 23 21 - 32 mmol/L    Anion Gap 13 10 - 20 mmol/L    Urea Nitrogen 10 6 - 23 mg/dL    Creatinine 0.85 0.50 - 1.05 mg/dL    eGFR 83 >60 mL/min/1.73m*2    Calcium 8.4 (L) 8.6 - 10.6 mg/dL    Phosphorus 4.2 2.5 - 4.9 mg/dL    Albumin 3.5 3.4 - 5.0 g/dL     No results found.    Current Medications  Scheduled medications  acetaminophen, 650 mg, oral, q6h ADY  ceFAZolin, 2 g, intravenous, q8h  docusate sodium, 100 mg, oral, BID  enoxaparin, 40 mg, subcutaneous, q24h  gabapentin, 300 mg, oral,  TID  ketorolac, 30 mg, intravenous, q6h ADY  methocarbamol, 1,000 mg, intravenous, q8h  traMADol, 50 mg, oral, q6h      Continuous medications  ropivacaine (PF) in NS cmpd, 14 mL/hr, Last Rate: 14 mL/hr (03/28/24 1141)      PRN medications  PRN medications: diphenhydrAMINE, HYDROmorphone, magnesium hydroxide, ondansetron **OR** ondansetron     Assessment  Cheyanne Rubio is a 53 y.o. female with a past medical history of pathogenic PALB2 gene mutation s/p bilateral mastectomy with immediate TRAM reconstruction and reconstruction of abdominal wall with mesh on 11/10/23. She is now s/p bilateral brachioplasty, bilateral breast revision with lateral chest liposuction and suction assisted lipectomy and skin excision of the lower abdomen with Dr. Gorman on 3/27/2024.     Plan/Recommendations  S/p bilateral brachioplasty, bilateral breast revision with lateral chest liposuction and suction assisted lipectomy and skin excision of the lower abdomen   - Maintain incisional wound vacs to bilateral upper extremities      · Settings: -125mmHg low continuous suction       · Notify plastic surgery with any concerns of leak or obstruction      · Monitor/record vac canister output [id number]   - Continue ROBBY drain care per nursing (Jpx4- b/l breasts, b/l arms)       · Empty and record output at least every 8 hours       · Strip drains TID and PRN to avoid drain obstruction        · Call plastics if drain output is >30cc in an hour or greater than 200cc over 8 hours       · Removal per plastic surgery team when output <30cc output/24hrs for x2 consecutive days   - Maintain surgical bra  - Maintain abdominal binder  - Regular diet   - Ambulate as tolerated, encourage OOB   - Upper extremity activity restrictions:        · No lifting >5lbs       · No using body weight to push off of the bed       · No lifting arms above the head       · No arm/elbow movement above the level of the shoulders  - PT/OT consult in am  - Remove palomino catheter  today, POD1  - Post op IV ancef x3 doses  - SQ Lovenox 40mg daily   -Follow up post op labs      · Mild leukocytosis 14.6       · Downtrended to 10.1 POD1, likely reactive 2/2 surgery       · K+ 6.9, likely hemolyzed   ·  Stat K+ 4.2, AM labs showed 3.9      · Continue to monitor labs   - Record and monitor strict I&Os  - Encourage IS use (10x/hr every hour while awake)  - AM labs     # Acute postoperative pain  - PO Tylenol 650mg q6h   - Gabapentin 300mg q8h  - IV Toradol 30mg q6hr  - IV robaxin 1000mg q8hr  - PO Tramadol 50mg q6hr  - IV dilaudid 0.2mg q4hr PRN for breakthrough  - Ropivacaine ambit pump per acute pain  - Colace 100 mg PO BID for constipation 2/2 narcotic use  - IV Zofran PRN nausea 2/2 narcotics      F: Not currently on IVF, encourage PO intake. Monitor I&O's  E: Replete PRN.   N: Regular diet  GI: Continue bowel regimen with Colace BID. IV Zofran PRN for nausea.      DVT ppx:  - SQ Lovenox 40mg daily  - SCDs  - Encourage OOB/ambulation       Disposition: Continue care on RNF. Will remain inpatient for pain control and post operative monitoring. Anticipate discharge home in 1-2 days.     Patient and plan discussed with Dr. Valdez, PA-C   Plastic and Reconstructive Surgery   Omar  Pager #[phone]  Team phones: [phone]

## 2024-03-28 NOTE — ANESTHESIA POSTPROCEDURE EVALUATION
Patient: Cheyanne Rubio    Procedure Summary       Date: 03/27/24 Room / Location: [facility] OR 10 / [facility] OR    Anesthesia Start: 0857 Anesthesia Stop: 1958    Procedures:       Dermatolipectomy Abdomen (Abdomen)      Excision Lesion Skin Torso (Abdomen)      Repair Laceration Torso (Abdomen)      Dermatolipectomy Upper Extremity (Bilateral) Diagnosis:       Deformity and disproportion of reconstructed breast      Status post bilateral breast reconstruction      Excess skin of abdomen      Excess skin of breast      Excessive subcutaneous fat      (Deformity and disproportion of reconstructed breast [N65.0, N65.1])      (Status post bilateral breast reconstruction [Z98.890])      (Excess skin of abdomen [L98.7])      (Excess skin of breast [L98.7])      (Excessive subcutaneous fat [E66.9])    Surgeons: Crystal Gorman MD Responsible Provider: Sunil Berrios MD    Anesthesia Type: general ASA Status: 2            Anesthesia Type: general    Vitals Value Taken Time   /80 03/27/24 1958   Temp 36 °C (96.8 °F) 03/27/24 1956   Pulse 95 03/27/24 1959   Resp 16 03/27/24 1959   SpO2 100 03/27/24 2001   Vitals shown include unvalidated device data.    Anesthesia Post Evaluation    Patient location during evaluation: PACU  Patient participation: complete - patient participated  Level of consciousness: awake and alert  Pain score: 3  Pain management: adequate  Airway patency: patent  Cardiovascular status: acceptable  Respiratory status: acceptable  Hydration status: acceptable  Postoperative Nausea and Vomiting: none        No notable events documented.

## 2024-03-29 LAB
ABO GROUP (TYPE) IN BLOOD: NORMAL
ALBUMIN SERPL BCP-MCNC: 3.1 G/DL (ref 3.4–5)
ANION GAP SERPL CALC-SCNC: 11 MMOL/L (ref 10–20)
ANTIBODY SCREEN: NORMAL
BLOOD EXPIRATION DATE: NORMAL
BUN SERPL-MCNC: 9 MG/DL (ref 6–23)
CALCIUM SERPL-MCNC: 8.3 MG/DL (ref 8.6–10.6)
CHLORIDE SERPL-SCNC: 101 MMOL/L (ref 98–107)
CO2 SERPL-SCNC: 24 MMOL/L (ref 21–32)
CREAT SERPL-MCNC: 0.79 MG/DL (ref 0.5–1.05)
DISPENSE STATUS: NORMAL
EGFRCR SERPLBLD CKD-EPI 2021: >90 ML/MIN/1.73M*2
ERYTHROCYTE [DISTWIDTH] IN BLOOD BY AUTOMATED COUNT: 13 % (ref 11.5–14.5)
GLUCOSE SERPL-MCNC: 71 MG/DL (ref 74–99)
HCT VFR BLD AUTO: 22.2 % (ref 36–46)
HGB BLD-MCNC: 7.3 G/DL (ref 12–16)
MCH RBC QN AUTO: 30.9 PG (ref 26–34)
MCHC RBC AUTO-ENTMCNC: 32.9 G/DL (ref 32–36)
MCV RBC AUTO: 94 FL (ref 80–100)
NRBC BLD-RTO: 0 /100 WBCS (ref 0–0)
PHOSPHATE SERPL-MCNC: 2.6 MG/DL (ref 2.5–4.9)
PLATELET # BLD AUTO: 130 X10*3/UL (ref 150–450)
POTASSIUM SERPL-SCNC: 3.5 MMOL/L (ref 3.5–5.3)
PRODUCT BLOOD TYPE: 1700
PRODUCT CODE: NORMAL
RBC # BLD AUTO: 2.36 X10*6/UL (ref 4–5.2)
RH FACTOR (ANTIGEN D): NORMAL
SODIUM SERPL-SCNC: 132 MMOL/L (ref 136–145)
UNIT ABO: NORMAL
UNIT NUMBER: NORMAL
UNIT RH: NORMAL
UNIT VOLUME: 350
WBC # BLD AUTO: 5.1 X10*3/UL (ref 4.4–11.3)
XM INTEP: NORMAL

## 2024-03-29 PROCEDURE — 86900 BLOOD TYPING SEROLOGIC ABO: CPT

## 2024-03-29 PROCEDURE — 2060000001 HC INTERMEDIATE ICU ROOM DAILY

## 2024-03-29 PROCEDURE — 97116 GAIT TRAINING THERAPY: CPT | Mod: GP

## 2024-03-29 PROCEDURE — 99232 SBSQ HOSP IP/OBS MODERATE 35: CPT

## 2024-03-29 PROCEDURE — 86920 COMPATIBILITY TEST SPIN: CPT

## 2024-03-29 PROCEDURE — 2500000001 HC RX 250 WO HCPCS SELF ADMINISTERED DRUGS (ALT 637 FOR MEDICARE OP): Performed by: PHYSICIAN ASSISTANT

## 2024-03-29 PROCEDURE — 2500000005 HC RX 250 GENERAL PHARMACY W/O HCPCS: Performed by: PHYSICIAN ASSISTANT

## 2024-03-29 PROCEDURE — 36430 TRANSFUSION BLD/BLD COMPNT: CPT

## 2024-03-29 PROCEDURE — 2500000004 HC RX 250 GENERAL PHARMACY W/ HCPCS (ALT 636 FOR OP/ED): Performed by: NURSE PRACTITIONER

## 2024-03-29 PROCEDURE — 80069 RENAL FUNCTION PANEL: CPT

## 2024-03-29 PROCEDURE — 2500000001 HC RX 250 WO HCPCS SELF ADMINISTERED DRUGS (ALT 637 FOR MEDICARE OP)

## 2024-03-29 PROCEDURE — 36415 COLL VENOUS BLD VENIPUNCTURE: CPT

## 2024-03-29 PROCEDURE — 85027 COMPLETE CBC AUTOMATED: CPT

## 2024-03-29 PROCEDURE — 2500000001 HC RX 250 WO HCPCS SELF ADMINISTERED DRUGS (ALT 637 FOR MEDICARE OP): Performed by: NURSE PRACTITIONER

## 2024-03-29 PROCEDURE — 86850 RBC ANTIBODY SCREEN: CPT

## 2024-03-29 PROCEDURE — 97530 THERAPEUTIC ACTIVITIES: CPT | Mod: GP

## 2024-03-29 PROCEDURE — P9016 RBC LEUKOCYTES REDUCED: HCPCS

## 2024-03-29 RX ORDER — DOCUSATE SODIUM 100 MG/1
100 CAPSULE, LIQUID FILLED ORAL 2 TIMES DAILY
Status: DISCONTINUED | OUTPATIENT
Start: 2024-03-29 | End: 2024-03-30 | Stop reason: HOSPADM

## 2024-03-29 RX ADMIN — ACETAMINOPHEN 650 MG: 325 TABLET ORAL at 17:24

## 2024-03-29 RX ADMIN — ONDANSETRON HYDROCHLORIDE 4 MG: 4 TABLET, FILM COATED ORAL at 07:02

## 2024-03-29 RX ADMIN — METHOCARBAMOL 1000 MG: 100 INJECTION, SOLUTION INTRAMUSCULAR; INTRAVENOUS at 06:49

## 2024-03-29 RX ADMIN — KETOROLAC TROMETHAMINE 30 MG: 30 INJECTION, SOLUTION INTRAMUSCULAR at 01:20

## 2024-03-29 RX ADMIN — KETOROLAC TROMETHAMINE 30 MG: 30 INJECTION, SOLUTION INTRAMUSCULAR at 06:49

## 2024-03-29 RX ADMIN — ACETAMINOPHEN 650 MG: 325 TABLET ORAL at 01:20

## 2024-03-29 RX ADMIN — TRAMADOL HYDROCHLORIDE 50 MG: 50 TABLET, COATED ORAL at 14:00

## 2024-03-29 RX ADMIN — TRAMADOL HYDROCHLORIDE 50 MG: 50 TABLET, COATED ORAL at 02:48

## 2024-03-29 RX ADMIN — ACETAMINOPHEN 650 MG: 325 TABLET ORAL at 12:36

## 2024-03-29 RX ADMIN — GABAPENTIN 300 MG: 300 CAPSULE ORAL at 09:20

## 2024-03-29 RX ADMIN — KETOROLAC TROMETHAMINE 30 MG: 30 INJECTION, SOLUTION INTRAMUSCULAR at 12:36

## 2024-03-29 RX ADMIN — METHOCARBAMOL 1000 MG: 100 INJECTION, SOLUTION INTRAMUSCULAR; INTRAVENOUS at 21:57

## 2024-03-29 RX ADMIN — TRAMADOL HYDROCHLORIDE 50 MG: 50 TABLET, COATED ORAL at 09:20

## 2024-03-29 RX ADMIN — ACETAMINOPHEN 650 MG: 325 TABLET ORAL at 06:49

## 2024-03-29 RX ADMIN — ONDANSETRON HYDROCHLORIDE 4 MG: 4 TABLET, FILM COATED ORAL at 17:24

## 2024-03-29 RX ADMIN — DOCUSATE SODIUM 100 MG: 100 CAPSULE, LIQUID FILLED ORAL at 12:00

## 2024-03-29 RX ADMIN — TRAMADOL HYDROCHLORIDE 50 MG: 50 TABLET, COATED ORAL at 20:07

## 2024-03-29 RX ADMIN — METHOCARBAMOL 1000 MG: 100 INJECTION, SOLUTION INTRAMUSCULAR; INTRAVENOUS at 14:05

## 2024-03-29 RX ADMIN — GABAPENTIN 300 MG: 300 CAPSULE ORAL at 14:05

## 2024-03-29 RX ADMIN — GABAPENTIN 300 MG: 300 CAPSULE ORAL at 21:57

## 2024-03-29 ASSESSMENT — PAIN SCALES - WONG BAKER: WONGBAKER_NUMERICALRESPONSE: NO HURT

## 2024-03-29 ASSESSMENT — COGNITIVE AND FUNCTIONAL STATUS - GENERAL
TURNING FROM BACK TO SIDE WHILE IN FLAT BAD: A LITTLE
MOVING FROM LYING ON BACK TO SITTING ON SIDE OF FLAT BED WITH BEDRAILS: A LITTLE
MOVING FROM LYING ON BACK TO SITTING ON SIDE OF FLAT BED WITH BEDRAILS: A LITTLE
TURNING FROM BACK TO SIDE WHILE IN FLAT BAD: A LITTLE
TOILETING: A LITTLE
STANDING UP FROM CHAIR USING ARMS: A LITTLE
PERSONAL GROOMING: A LITTLE
MOBILITY SCORE: 18
WALKING IN HOSPITAL ROOM: A LITTLE
DRESSING REGULAR UPPER BODY CLOTHING: A LITTLE
HELP NEEDED FOR BATHING: A LITTLE
CLIMB 3 TO 5 STEPS WITH RAILING: A LITTLE
STANDING UP FROM CHAIR USING ARMS: A LITTLE
MOVING TO AND FROM BED TO CHAIR: A LITTLE
DRESSING REGULAR LOWER BODY CLOTHING: A LITTLE
EATING MEALS: A LITTLE
DAILY ACTIVITIY SCORE: 18
MOBILITY SCORE: 18
MOVING TO AND FROM BED TO CHAIR: A LITTLE
WALKING IN HOSPITAL ROOM: A LITTLE
CLIMB 3 TO 5 STEPS WITH RAILING: A LITTLE

## 2024-03-29 ASSESSMENT — PAIN - FUNCTIONAL ASSESSMENT
PAIN_FUNCTIONAL_ASSESSMENT: 0-10

## 2024-03-29 ASSESSMENT — PAIN SCALES - GENERAL
PAINLEVEL_OUTOF10: 0 - NO PAIN
PAINLEVEL_OUTOF10: 4
PAINLEVEL_OUTOF10: 2
PAINLEVEL_OUTOF10: 2

## 2024-03-29 NOTE — PROGRESS NOTES
Physical Therapy    Physical Therapy Treatment    Patient Name: Cheyanne Rubio  MRN: [id number]  Today's Date: 3/29/2024  Time Calculation  Start Time: 1326  Stop Time: 1349  Time Calculation (min): 23 min       Assessment/Plan   PT Assessment  PT Assessment Results: Decreased strength, Decreased range of motion, Decreased endurance, Impaired balance, Decreased mobility, Orthopedic restrictions, Pain  Rehab Prognosis: Good  Barriers to Discharge: none  Evaluation/Treatment Tolerance: Patient tolerated treatment well  Medical Staff Made Aware: Yes  Strengths: Attitude of self, Coping skills, Physical health, Support of extended family/friends  Barriers to Participation:  (none)  End of Session Communication: Bedside nurse  Assessment Comment: The pt demonstrated significant functional progression with decreased assistance with bed mobility, transfers, amb, and increased amb distance without an assistive device.  End of Session Patient Position: Up in chair, Alarm off, not on at start of session  PT Plan  Inpatient/Swing Bed or Outpatient: Inpatient  PT Plan  Treatment/Interventions: Bed mobility, Transfer training, Gait training, Stair training, Balance training, Strengthening, Endurance training, Therapeutic exercise, Therapeutic activity, Home exercise program  PT Plan: Skilled PT  PT Frequency: 3 times per week  PT Discharge Recommendations: No PT needed after discharge  Equipment Recommended upon Discharge:  (none)  PT Recommended Transfer Status: Stand by assist  PT - OK to Discharge: Yes      General Visit Information:   PT  Visit  PT Received On: 03/29/24  Response to Previous Treatment: Patient reporting fatigue but able to participate.  General  Family/Caregiver Present: Yes  Caregiver Feedback:  present with support.  Prior to Session Communication: Bedside nurse  Patient Position Received: Bed, 3 rail up, Alarm off, not on at start of session  Preferred Learning Style: verbal, visual, written  General  Comment: The pt was pleasant, cooperative and willing to participate in therapy.    Subjective   Precautions:  Precautions  Hearing/Visual Limitations: Hearing and vision WFL  UE Weight Bearing Status: Other (Comment) (Sina UE do not elevate higher than shoulder, do not lift >5lbs, and do not use to push up body weight)  Medical Precautions: Fall precautions, Abdominal precautions  Precautions Comment: Pt in compliance with precautions throughout PT session.  Vital Signs:  Vital Signs  Heart Rate: 62  Heart Rate Source: Monitor  SpO2: 95 % (on RA)  BP: 120/73  Patient Position: Lying    Objective   Pain:  Pain Assessment  Pain Assessment: 0-10  Pain Score: 2  Pain Type: Acute pain, Surgical pain  Pain Location: Rib cage  Pain Orientation: Left  Multiple Pain Sites: Two  Pain 2  Pain Score 2: 2  Pain Type 2: Acute pain  Pain Location 2: Arm  Pain Orientation 2: Left  Cognition:  Cognition  Overall Cognitive Status: Within Functional Limits  Orientation Level: Oriented X4  Postural Control:  Postural Control  Postural Control: Within Functional Limits  Posture Comment: Pt presented with good sitting and standing posture without an assistive device.  Static Sitting Balance  Static Sitting-Balance Support: Bilateral upper extremity supported, Feet supported  Static Sitting-Level of Assistance: Close supervision  Dynamic Sitting Balance  Dynamic Sitting-Balance Support: Bilateral upper extremity supported, Feet supported  Dynamic Sitting-Comments: SBA  Static Standing Balance  Static Standing-Balance Support: No upper extremity supported  Static Standing-Level of Assistance: Close supervision  Static Standing-Comment/Number of Minutes: no device  Dynamic Standing Balance  Dynamic Standing-Balance Support: No upper extremity supported  Dynamic Standing-Comments: SBA with no device  Extremity/Trunk Assessments:  Cervical Spine   Cervical Spine: Within Functional Limits  Lumbar Spine   Lumbar Spine : Exceptions to Funtional  Limits  Lumbar Spine Comment: decreased strength and ROM    RUE   RUE : Exceptions to WFL  RUE AROM (degrees)  RUE AROM Comment: WFL  RUE Strength  R Shoulder Flexion: 4-/5  R Elbow Flexion: 4/5  R Elbow Extension: 4/5  R Wrist Flexion: 3+/5  R Wrist Extension: 3+/5  R Wrist Radial Deviation:  (decreased  strength)  LUE   LUE: Exceptions to WFL  LUE AROM (degrees)  LUE AROM Comment: WFL  LUE Strength  L Shoulder Flexion: 3+/5  L Elbow Flexion: 3+/5  L Elbow Extension: 3+/5  L Wrist Flexion: 3-/5  L Wrist Extension: 3-/5  L Wrist Radial Deviation:  (decreased  strength)  RLE   RLE : Within Functional Limits  LLE   LLE : Within Functional Limits  Activity Tolerance:  Activity Tolerance  Endurance: Endurance does not limit participation in activity  Treatments:        Bed Mobility  Bed Mobility: Yes  Bed Mobility 1  Bed Mobility 1: Supine to sitting  Level of Assistance 1: Close supervision  Bed Mobility Comments 1: HOB elevated    Ambulation/Gait Training  Ambulation/Gait Training Performed: Yes  Ambulation/Gait Training 1  Surface 1: Level tile  Device 1: No device  Assistance 1: Close supervision  Quality of Gait 1: Decreased step length (slightly unsteady, decreased joel)  Comments/Distance (ft) 1: 150ft  Transfers  Transfer: Yes  Transfer 1  Transfer From 1: Sit to  Transfer to 1: Stand  Transfer Device 1:  (no device)  Transfer Level of Assistance 1: Close supervision  Transfers 2  Transfer From 2: Stand to  Transfer to 2: Sit  Transfer Device 2:  (no device)  Transfer Level of Assistance 2: Close supervision  Transfers 3  Transfer From 3: Bed to  Transfer to 3: Chair with arms  Transfer Device 3:  (no device)  Transfer Level of Assistance 3: Close supervision    Outcome Measures:  [facility] Basic Mobility  Turning from your back to your side while in a flat bed without using bedrails: A little  Moving from lying on your back to sitting on the side of a flat bed without using bedrails: A little  Moving to  and from bed to chair (including a wheelchair): A little  Standing up from a chair using your arms (e.g. wheelchair or bedside chair): A little  To walk in hospital room: A little  Climbing 3-5 steps with railing: A little  Basic Mobility - Total Score: 18    Education Documentation  Handouts, taught by Donald Luque, PT at 3/29/2024  2:42 PM.  Learner: Significant Other, Patient  Readiness: Acceptance  Method: Explanation, Demonstration  Response: Verbalizes Understanding, Demonstrated Understanding    Precautions, taught by Donald Luque PT at 3/29/2024  2:42 PM.  Learner: Significant Other, Patient  Readiness: Acceptance  Method: Explanation, Demonstration  Response: Verbalizes Understanding, Demonstrated Understanding    Body Mechanics, taught by Dnoald Luque, PT at 3/29/2024  2:42 PM.  Learner: Significant Other, Patient  Readiness: Acceptance  Method: Explanation, Demonstration  Response: Verbalizes Understanding, Demonstrated Understanding    Home Exercise Program, taught by Donald Luque, PT at 3/29/2024  2:42 PM.  Learner: Significant Other, Patient  Readiness: Acceptance  Method: Explanation, Demonstration  Response: Verbalizes Understanding, Demonstrated Understanding    Mobility Training, taught by Donald Luque, PT at 3/29/2024  2:42 PM.  Learner: Significant Other, Patient  Readiness: Acceptance  Method: Explanation, Demonstration  Response: Verbalizes Understanding, Demonstrated Understanding    Education Comments  No comments found.      OP EDUCATION:  Outpatient Education  Individual(s) Educated: Patient, Spouse  Education Provided: Body Mechanics, Fall Risk, Home Exercise Program, Home Safety, POC, Post-Op Precautions, Posture  Patient Response to Education: Patient/Caregiver Verbalized Understanding of Information, Patient/Caregiver Performed Return Demonstration of Exercises/Activities    Encounter Problems       Encounter Problems (Active)       Balance       STG -  Maintains independent dynamic standing balance without upper extremity support. (Progressing)       Start:  03/28/24    Expected End:  04/11/24            STG - Maintains independent static standing balance without upper extremity support. (Progressing)       Start:  03/28/24    Expected End:  04/11/24               Mobility       STG - Patient will ambulate 150' mod independently with a wheeled walker.  (Progressing)       Start:  03/28/24    Expected End:  04/11/24            STG - Patient will ascend and descend a flight of stairs with min A and use of 1 HR.  (Progressing)       Start:  03/28/24    Expected End:  04/11/24               PT Transfers       STG - Transfer from bed to chair mod independently with a wheeled walker. (Progressing)       Start:  03/28/24    Expected End:  04/11/24            STG - Patient will perform bed mobility independently. (Progressing)       Start:  03/28/24    Expected End:  04/11/24            STG - Patient will transfer sit to and from stand mod independently with a wheeled walker.  (Progressing)       Start:  03/28/24    Expected End:  04/11/24               Pain - Adult

## 2024-03-29 NOTE — DISCHARGE INSTRUCTIONS
Plastic Surgery Post Discharge Instructions  You were admitted to [facility] for postoperative monitoring and pain control following bilateral breast revision with liposuction, bilateral abdominal incision revision and bilateral brachioplasty on 3/27 with Dr. Gorman of plastic surgery. Included below are post-discharge instructions and details regarding follow-up.     Thank you for allowing us to participate in your care.    Best Regards,  [facility]  Department of Plastic and Reconstructive Surgery    Activity:  Maintain the following restrictions: No pushing, pulling or lifting objects greater than 5 pounds. No lifting arms above the head. No arm/elbow movement above the level of the shoulders. Continue to elevate your arms to alleviate postoperative edema.    No showering until your wound vacs are removed at your follow-up visit. You may sponge bath. Avoid soaking or submerging surgical incisions/sites or wetting wound vac dressing or device.      Walking (as tolerated) is encouraged.    Continue use of your incentive spirometer.     Surgical Site/Wound Care:  Prevena/wound vac: Please allow Prevena incisional wound vac dressing to remain in place until output follow-up with plastic surgery. When showering, do not allow the wound vac dressing or device to become wet. When resting, please make sure to plug in the device with the supplied power cord to maintain the battery. The device has a power button at the center which is encircled by green lights. There will be one less light illuminated each day (every 24 hrs) which is to be expected, and signifies the count down of the duration of the vac device. If you experience any issues with your wound vac including alarms, malfunction or dressing issues please refer to the provided instruction manual or contact the plastic surgery office at [phone].     Local wound care instructions: Your breast and abdominal  incisions are closed with skin glue. The skin glue is waterproof and will eventually peel off over time. All of the sutures underneath the skin glue are absorbable. Avoid application of creams, lotions or ointments to surgical site, no soaking or scrubbing of surgical sites.     Nutrition:  You may resume a regular diet following surgery. Ensure that you are drinking an adequate amount of fluids to maintain hydration.     Medication Instructions:  You may resume use of your home prescribed mediations as previously directed following discharge from the hospital. If you were taking medications prior to your surgery and they are not listed on your discharge homegoing instructions medications list, consult your MD before you resume these medications.    Some postoperative pain is not unusual. This is usually relieved by taking prescribed or over the counter Acetaminophen/Tylenol, Motrin/ibuprofen. In cases of severe pain, you may use prescribed Tramadol as directed. Severe pain despite administration of pain medication must be reported to your physician.    Remember when taking Acetaminophen, do NOT exceed more than 1000 milligrams (mg) per dose or more than 4000 mg total per day. Taking too much Acetaminophen at one time can damage your liver. The maximum amount of ibuprofen in adults is 800 mg per dose or 3200 mg per day. Call your MD if you have any questions about your medications. To prevent constipation while taking narcotic pain medications, please utilize your prescribed bowel regimen, ensure that you drink plenty of water, eat fiber rich foods (a good source is fruit) and increase activity progressively.    DO NOT drive a car while utilizing narcotic pain medications and until cleared by MD at follow-up appointment. Driving or operating heavy machinery, lawnmowers or power tools while taking opiod/narcotic pain medications may impair your judgement.    Call Physician If:  Contact the plastic surgery office for  any questions and/or concerns regarding the surgical incision/site.  1. [phone] if Monday-Friday (8 a.m. - 4:30 p.m.)  2. [phone] and ask for the Plastic Surgery team on call provider if after hours or on weekends  3. Email [email or web address] for any non-urgent concerns and when relaying weekly progress photos of flap sites. ***    Call your MD or seek immediate medical attention if you experience any of the following symptoms:  1. Fever of 101.5 (38.5 C) or greater  2. Pain not controlled with prescribed pain medications  3. Uncontrolled nausea and/or vomiting  4. Drainage or swelling around your incisions and/or surgical sites   5. Separation of incisions, or tearing of the incision line  6. Large fluid collection under or around the incision or flap sites   7. Flap discoloration (including darkened appearance)  8. Difficulty breathing  9. Swelling, pain, heat and/or redness in your legs and/or calves  10. Inability to tolerate diet/fluid intake    Follow-up/Post Discharge Appointments:  Follow-up care is a key part of your treatment and safety. It is very important that you maintain follow-up care as directed so that your surgical site heals properly and does not lead to problems. Always carry a current medication list with you and bring it to ALL healthcare Provider visits. Be sure to maintain follow up with plastic surgery at your scheduled appointment. If you are unable to keep your appointment, or need to reschedule please contact our office at [phone].       4/8 @ 1:00 [facility] 2nd Floor [address]

## 2024-03-29 NOTE — PROGRESS NOTES
Acute Pain Service    Cheyanne Rubio is a 53 y.o. year old female patient who presents for breast reconstruction revision w/ free flap bilateral dermatolipectomy abdomen excision lesion skin torso repair torso dermatolipectomy upper extremity bilateral with Dr. Gorman. Acute Pain consulted for block for postoperative pain control.     Postop Pain HPI -   Palliative: relieved with IV analgesics and regional local anesthetics  Provocative: movement  Quality:  burning and aching  Radiation:  none  Severity:  denies incisional pain. Endorses L sided arm pain   Timing: constant    24-HOUR OPIOID CONSUMPTION:  none    Scheduled medications  acetaminophen, 650 mg, oral, q6h ADY  docusate sodium, 100 mg, oral, BID  [Held by provider] enoxaparin, 40 mg, subcutaneous, q24h  gabapentin, 300 mg, oral, TID  ketorolac, 30 mg, intravenous, q6h ADY  methocarbamol, 1,000 mg, intravenous, q8h  traMADol, 50 mg, oral, q6h      Continuous medications  ropivacaine (PF) in NS cmpd, 14 mL/hr, Last Rate: 14 mL/hr (03/28/24 1141)      PRN medications  PRN medications: diphenhydrAMINE, HYDROmorphone, magnesium hydroxide, ondansetron **OR** ondansetron     Physical Exam:  Constitutional:  no distress, alert and cooperative  Eyes: clear sclera  Head/Neck: No apparent injury, trachea midline  Respiratory/Thorax: Patent airways, thorax symmetric, breathing comfortably  Cardiovascular: no pitting edema  Gastrointestinal: Nondistended  Musculoskeletal: ROM intact  Extremities: no clubbing  Neurological: alert, perez x4  Psychological: Appropriate affect    Results for orders placed or performed during the hospital encounter of 03/27/24 (from the past 24 hour(s))   CBC   Result Value Ref Range    WBC 5.1 4.4 - 11.3 x10*3/uL    nRBC 0.0 0.0 - 0.0 /100 WBCs    RBC 2.36 (L) 4.00 - 5.20 x10*6/uL    Hemoglobin 7.3 (L) 12.0 - 16.0 g/dL    Hematocrit 22.2 (L) 36.0 - 46.0 %    MCV 94 80 - 100 fL    MCH 30.9 26.0 - 34.0 pg    MCHC 32.9 32.0 - 36.0 g/dL    RDW  13.0 11.5 - 14.5 %    Platelets 130 (L) 150 - 450 x10*3/uL   Renal Function Panel   Result Value Ref Range    Glucose 71 (L) 74 - 99 mg/dL    Sodium 132 (L) 136 - 145 mmol/L    Potassium 3.5 3.5 - 5.3 mmol/L    Chloride 101 98 - 107 mmol/L    Bicarbonate 24 21 - 32 mmol/L    Anion Gap 11 10 - 20 mmol/L    Urea Nitrogen 9 6 - 23 mg/dL    Creatinine 0.79 0.50 - 1.05 mg/dL    eGFR >90 >60 mL/min/1.73m*2    Calcium 8.3 (L) 8.6 - 10.6 mg/dL    Phosphorus 2.6 2.5 - 4.9 mg/dL    Albumin 3.1 (L) 3.4 - 5.0 g/dL        Cheyanne Rubio is a 53 y.o. year old female patient who presents for breast reconstruction revision w/ free flap bilateral dermatolipectomy abdomen excision lesion skin torso repair torso dermatolipectomy upper extremity bilateral with Dr. Gorman. Acute Pain consulted for block for postoperative pain control.      Plan:     - Bilateral paravertebral T4 blocks with catheters performed preoperatively on 3/27/24  - Ambit pump with Ropivacaine 0.2%/NaCl 0.9% 500mL, Rate 7 cc/hr bilaterally  - Ambit medication will not interfere with pain medication prescribed by the primary team.   - As per nursing,overnight catheter disconnected from cap. Acute pain team sterily reconnected catheter and ambit now infusing bilaterally with no issues.   - Ambit refilled and 5cc 0.5% ropivacaine bolused through catheter.   - Please hold tonight's lovenox dose if patient stays inpatient so we can Dc catheters tomorrow AM.   - Please be aware of local anesthetic toxic dose and absorption variability before considering lidocaine patches  - Acute pain service will follow while catheters in place  - Rest of pain management per primary team    Acute Pain Resident  pg [phone] ph [phone]

## 2024-03-29 NOTE — PROGRESS NOTES
"  Department of Plastic and Reconstructive Surgery  Daily Progress Note    Patient Name: Cheyanne Rubio  MRN: [id number]  Date:  03/29/24     Subjective  Patient resting comfortably in bed on exam. Pain is well controlled. Acute pain removing pump today. Stable numbness and tingling s/p procedure in November. Able to  fingers and flex at the elbow with increasing strength. Denies any fever, chills, night sweats, CP, SOB, palpitations, nausea, vomiting, or abdominal pain/discomfort.      Overnight Events  NAOE    Objective    Vital Signs  /73 (BP Location: Right leg)   Pulse 63   Temp 36.1 °C (97 °F) (Temporal)   Resp 18   Ht 1.727 m (5' 7.99\")   Wt 89.8 kg (198 lb)   SpO2 96%   BMI 30.11 kg/m²      Physical Exam:   Constitutional: A&Ox3, calm and cooperative, NAD  Eyes: PERRL, clear sclera   ENMT: Moist mucous membranes, no apparent injuries or lesions  Head/Neck: Neck supple, no JVD  Cardiovascular: Normal rate and regular rhythm, 2+ equal pulses of the distal extremities  Respiratory/Thorax: CTAB, regular respirations on RA, good symmetric chest expansion  Gastrointestinal: Abdomen soft, appropriately tender post operatively. Bilateral flank incisions with skin glue, C/D/I  Breasts: Bilateral breasts soft, warm to touch. Incisions with skin glue, C/D/I. Jpx1 to bilateral breasts with SS output.   Genitourinary: Voiding independently  Extremities: Prevena incisional wound vacs intact to bilateral upper extremities with no leak or malfunction. Mild swelling of bilateral upper extremities, L>R. Compartments warm, soft and compressible. Motor and sensory intact. 2+ radial pulses bilaterally. Jpx1 to bilateral UE with SS output.   Neurological: A&Ox3  Psychological: Appropriate mood and behavior  Skin: Warm and dry with no lesions or rashes      Diagnostics   Results for orders placed or performed during the hospital encounter of 03/27/24 (from the past 24 hour(s))   CBC   Result Value Ref Range    WBC 5.1 " 4.4 - 11.3 x10*3/uL    nRBC 0.0 0.0 - 0.0 /100 WBCs    RBC 2.36 (L) 4.00 - 5.20 x10*6/uL    Hemoglobin 7.3 (L) 12.0 - 16.0 g/dL    Hematocrit 22.2 (L) 36.0 - 46.0 %    MCV 94 80 - 100 fL    MCH 30.9 26.0 - 34.0 pg    MCHC 32.9 32.0 - 36.0 g/dL    RDW 13.0 11.5 - 14.5 %    Platelets 130 (L) 150 - 450 x10*3/uL   Renal Function Panel   Result Value Ref Range    Glucose 71 (L) 74 - 99 mg/dL    Sodium 132 (L) 136 - 145 mmol/L    Potassium 3.5 3.5 - 5.3 mmol/L    Chloride 101 98 - 107 mmol/L    Bicarbonate 24 21 - 32 mmol/L    Anion Gap 11 10 - 20 mmol/L    Urea Nitrogen 9 6 - 23 mg/dL    Creatinine 0.79 0.50 - 1.05 mg/dL    eGFR >90 >60 mL/min/1.73m*2    Calcium 8.3 (L) 8.6 - 10.6 mg/dL    Phosphorus 2.6 2.5 - 4.9 mg/dL    Albumin 3.1 (L) 3.4 - 5.0 g/dL     No results found.    Current Medications  Scheduled medications  acetaminophen, 650 mg, oral, q6h ADY  docusate sodium, 100 mg, oral, BID  [Held by provider] enoxaparin, 40 mg, subcutaneous, q24h  gabapentin, 300 mg, oral, TID  ketorolac, 30 mg, intravenous, q6h ADY  methocarbamol, 1,000 mg, intravenous, q8h  traMADol, 50 mg, oral, q6h      Continuous medications  ropivacaine (PF) in NS cmpd, 14 mL/hr, Last Rate: 14 mL/hr (03/28/24 1141)      PRN medications  PRN medications: diphenhydrAMINE, HYDROmorphone, magnesium hydroxide, ondansetron **OR** ondansetron     Assessment  Cheyanne Rubio is a 53 y.o. female with a past medical history of pathogenic PALB2 gene mutation s/p bilateral mastectomy with immediate TRAM reconstruction and reconstruction of abdominal wall with mesh on 11/10/23. She is now s/p bilateral brachioplasty, bilateral breast revision with lateral chest liposuction and suction assisted lipectomy and skin excision of the lower abdomen with Dr. Gorman on 3/27/2024.     Plan/Recommendations  S/p bilateral brachioplasty, bilateral breast revision with lateral chest liposuction and suction assisted lipectomy and skin excision of the lower abdomen   -  Maintain incisional wound vacs to bilateral upper extremities      · Settings: -125mmHg low continuous suction       · Notify plastic surgery with any concerns of leak or obstruction      · Monitor/record vac canister output [id number]   - Continue ROBBY drain care per nursing (Jpx4- b/l breasts, b/l arms)       · Empty and record output at least every 8 hours       · Strip drains TID and PRN to avoid drain obstruction        · Call plastics if drain output is >30cc in an hour or greater than 200cc over 8 hours       · Removal per plastic surgery team when output <30cc output/24hrs for x2 consecutive days   - Maintain surgical bra  - Maintain abdominal binder  - Regular diet   - Ambulate as tolerated, encourage OOB   - Upper extremity activity restrictions:        · No lifting >5lbs       · No using body weight to push off of the bed       · No lifting arms above the head       · No arm/elbow movement above the level of the shoulders  - PT/OT  - S/p IV ancef x3 doses   -Follow up post op labs      · Mild leukocytosis 14.6       · Downtrended to 10.1 POD1 and 5.1 on POD2, likely reactive 2/2 surgery       · K+ 6.9, likely hemolyzed   ·  Stat K+ 4.2, AM labs showed 3.5      · Continue to monitor labs   - Record and monitor strict I&Os  - Encourage IS use (10x/hr every hour while awake)     # Acute postoperative pain  - PO Tylenol 650mg q6h   - Gabapentin 300mg q8h  - IV Toradol 30mg q6hr  - IV robaxin 1000mg q8hr  - PO Tramadol 50mg q6hr  - IV dilaudid 0.2mg q4hr PRN for breakthrough  - Ropivacaine ambit pump per acute pain dc today  - Colace 100 mg PO BID for constipation 2/2 narcotic use  - IV Zofran PRN nausea 2/2 narcotics     # Anemia  - Hgb dropped from 11.1 on 3/27 to 7.3 3/29, pt endorses fatigue  - 1 unit PRBCs ordered 3/29  - Monitor post-transfusion CBC for improvement in Hgb     F: Not currently on IVF, encourage PO intake. Monitor I&O's  E: Replete PRN.   N: Regular diet  GI: Continue bowel regimen with Colace  BID. IV Zofran PRN for nausea.      DVT ppx:  - SQ Lovenox 40mg daily  - SCDs  - Encourage OOB/ambulation       Disposition: Administer 1 unit PRBCs and monitor for improvement in Hgb. Assess status after transfusion. Possible DC tonight or tomorrow if Hgb improved.    Patient and plan discussed with Dr. Valdez, PA-C   Plastic and Reconstructive Surgery   [place]  Pager #[phone]  Team phones: [phone]

## 2024-03-29 NOTE — OP NOTE
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, 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]     * Status post bilateral breast reconstruction [Z98.890]     * Excess skin of abdomen [L98.7]     * Excess skin of breast [L98.7]     * Excessive subcutaneous fat [E66.9]     Procedures  Dermatolipectomy Abdomen  59574 - MO SUCTION ASSISTED LIPECTOMY TRUNK    Excision Lesion Skin Torso  38020 - MO EXC B9 LESION MRGN XCP SK TG T/A/L >4.0 CM    Repair Laceration Torso  14652 - MO REPAIR INTERMEDIATE S/A/T/E 20.1-30.0 CM    Dermatolipectomy Upper Extremity  07717 - MO EXCISION EXCESSIVE SKIN & SUBQ TISSUE ARM    Revision of reconstructed breast 32532    Surgeons      * Crystal Gorman - Primary    Resident/Fellow/Other Assistant:  Surgeon(s) and Role:     * Mariza Chacko PA-C - Assisting     * OTILIO Brown-CNP - KENAN First Assist    Procedure Summary  Anesthesia: General  ASA: II  Anesthesia Staff: Anesthesiologist: Indio Andres DO; Sunil Berrios MD; Adina Garrett MD  CRNA: OTILIO Bee-CRNA; OTILIO Mckinley-CRNA; OTILIO Santiago-CRNA  Anesthesia Resident: Ace Schaffer DO; Kori Morrison MD  Estimated Blood Loss: 200mL  Intra-op Medications:   Administrations occurring from 0815 to 1440 on 24:   Medication Name Total Dose   EPINEPHrine (Adrenalin) 5 mL, lidocaine (Xylocaine) 150 mL in sodium chloride 0.9% 5,000 mL syringe 2,062 mL    sodium chloride 0.9 % irrigation solution 2,000 mL              Anesthesia Record               Intraprocedure I/O Totals          Intake    Propofol Drip 0.00 mL    The total shown is the total volume documented since Anesthesia Start was filed.    LR infusion 3000.00 mL    Total Intake 3000 mL       Output    Urine 610 mL    Est. Blood Loss 200 mL    Total Output 810 mL       Net    Net Volume 2190 mL          Specimen: No specimens collected     Staff:   Circulator: Brooklyn Capellan RN; Kaley Hall RN; Arnold Keith RN  Relief Scrub: Rose Marie Tran RN; Yvette Bell  Scrub Person: Lisa Shoemakeriainsahil         Drains and/or Catheters:   Closed/Suction Drain Right Breast (Active)   Site Description Healing 03/29/24 0800   Dressing Status Clean;Dry 03/28/24 2100   Drainage Appearance Serosanguineous 03/27/24 2130   Status To bulb suction 03/27/24 2130   Output (mL) 5 mL 03/29/24 1711       Closed/Suction Drain Inferior;Left Breast (Active)   Site Description Healing 03/29/24 0800   Dressing Status Clean;Dry 03/28/24 2100   Drainage Appearance Serosanguineous 03/27/24 2130   Status To bulb suction 03/27/24 2130   Number of Sutures Removed 3.5 03/29/24 1711   Output (mL) 0 mL 03/29/24 1328       Closed/Suction Drain 3 Right Other (Comment) 15 Fr. (Active)   Site Description Healing 03/29/24 0800   Dressing Status Clean;Dry 03/28/24 2100   Drainage Appearance Serosanguineous 03/27/24 2130   Status To bulb suction 03/27/24 2130   Output (mL) 5 mL 03/29/24 1711       Closed/Suction Drain 4 Left Other (Comment) Bulb 15 Fr. (Active)   Site Description Healing 03/29/24 0800   Dressing Status Clean;Dry 03/28/24 2100   Drainage Appearance Serosanguineous 03/27/24 2130   Status To bulb suction 03/27/24 2130   Output (mL) 3.5 mL 03/29/24 1711       [REMOVED] Urethral Catheter Non-latex 16 Fr. (Removed)   Site Assessment Clean;Skin intact 03/28/24 0915   Collection Container Standard drainage bag 03/28/24 0036   Securement Method  Securing device (Describe) 03/28/24 0036   Reason for Continuing Urinary Catheterization surgical procedures: urological/gynecological, pelvic oncology, anal, prolonged surgical procedure 03/28/24 0900   Output (mL) 35 mL 03/28/24 1400           Indications: Cheyanne Rubio is an 53 y.o. female who is having surgery for deformity and disproportion of reconstructed breast [N65.0, N65.1], Excess skin of abdomen [L98.7] Status post bilateral breast reconstruction [Z98.890], Excessive subcutaneous fat [E66.9] and skin at both arms.     The patient received bilateral skin sparing mastectomy and autologous reconstruction for pathogenic PALB2 gene mutation on 11/10/23. The patient wants to improve the symmetry of her reconstructed breasts, and wants them smaller. She also presents with excess skin and fat at both ends of the inferior transverse abdominal incision after TRAM and MS-TRAM harvest as well as the arms. She is indicated for reduction of the diogo breasts, suction-assisted lipectomy of the excess fat and skin at the abdomen with excision of redundant skin. Additionally, she is indicated for brachioplasty.     Informed Consent:  The patient was seen in the preoperative area. The risks, benefits, complications, treatment options, non-operative alternatives, expected recovery and outcomes were discussed with the patient. The possibilities of reaction to medication, pulmonary aspiration, pulmonary embolism and deep vein thrombosis,  injury to surrounding structures, bleeding and hematoma, seroma, recurrent infection, partial flap necrosis, wound healing issues, asymmetry, dissatisfaction with results, the need for additional procedures, failure to diagnose a condition, and creating a complication requiring transfusion or operation were discussed with the patient. The patient concurred with the proposed plan, giving informed consent.  The site of surgery was properly per policy. The patient has been actively warmed in  preoperative area. Preoperative antibiotics have been ordered and given within 1 hours of incision. Venous thrombosis prophylaxis have been ordered including bilateral sequential compression devices    Procedure Details:     The patient was brought to operating theatre on her bed. As soon as she entered the room, safety huddle was performed, and we verified patient's ID, MRN, , planned procedure, and surgical sites, and we reviewed allergies history, and need for antibiotics. All in room were in agreement. Patient was then transferred to OR table, positioned supine with arms at 90 degrees with her trunk, supported with arm boards. She was held securely with safety belt, and all bony prominences were well padded to avoid pressure sores. SCDs were applied to both legs. General anesthesia was administered by anesthesia personnel. Next, the surgical sites were prepped with betadine paint, and draped in standard fashion.   Time out was then performed and all in the room were in agreement. She was given antibiotics before incision.     I proceeded with flap revision. Revised Wise pattern skin incision was drawn preoperatively, superomedial pedicle was marked intraoperatively. 15 blade scalpel was used to incise the mastectomy skin and the flap skin paddle along the marking lines. The inferolateral incisions were deepened down to the level of the flap using electrocautery. After that, the flap tissue was removed from the inferomedial and lateral aspects of the diogo-breast. Meticulous hemostasis was maintained using bipolar and electrocautery. Next, the superomedial pedicle was deepeithelialized. After that, the wise pattern was closed temporarily with staples. The same procedure was carried out on the contralateral breast. Then, the table was adjusted, and the breasts were evaluated. Additionally skin excision was needed to enhance the contour. New markings lines were drawn, and staples were used again to reconstruct the  shape. Satisfactory size and symmetry was observed. The new lines were then incised using 15 blade scalpel and electrocautery was used for hemostasia. The wounds were then closed using 2/0 PDS for superficial fascial layer repair, and 3/0 Monocryl for deep dermal repair, and finally 3/0 stratfix for subcuticular closure. Two 15 fr drains were left to drain the revised breasts. 340 g of tissue were removed per side.     Next, I shifted my attention to the abdomen. The marked areas of excess skin and fat were infiltrated with the following solution (1 L NS + 1 ml 1:1000 epinephrine + 30 ml lidocaine 1%), 1 L were injected per side through access wound placed within the previous scar. The excess fat was next aspirated using power-assisted liposuction machine and 4 mm cannula. 900 ml were aspirated per side, for a total of 1800 ml. Next, the excess skin was measured, and 12 x 5 cm of excess skin was excised bilaterally at both ends of the inferior transverse scar from past surgery, with care so that the new scar will be an extension of the previous scar. Excision was performed using 15 blade scalpel and electrocautery down to the Scarps's fascia. The wound was then closed bilaterally in layers using 2/0 PDS for the camper's fascia closure, 3/0 Monocryl for the deep dermal closure, and 3/0 Stratafix for subcuticular closure.      The wounds at the breast and abdomen were dressed with dermabond.     Next, we moved to work on both arms to perform brachioplasty. I started with tumescent infiltration. The past solution was used. Infiltration cannulas were inserted via access incisions 0.5 cm in length placed within the marked area of skin excision, adjacent to axilla and elbow. Infiltration targeted fat deposits at the posterior arm. I avoided the medial aspect of the arm, and the deltoid was not injected as there were no fat deposits over that area. After that, and through the same incisions, the fat was aspirated using 4  mm cannulas. I aspirated 350 per side. I injected 500 cc preside.    Next, the medial incision was made 2 cm below the bicipital grove, the incision was made with 15 blade scalpel down to the level of deep dermis, the incision was deepened down to the level of arm fascia using electrocautery, dissection then proceeded posterolaterally above the arm fascia, sensory nerves and major superficial veins were carefully dissected and preserved. After that, the flap was advanced anteromedially, split into two halves until tension-free reduction was achieved. The excess skin was then excised using 10 blade scalpel. The dog ears at the axilla and above the elbow were excised placing the residual scar behind the medial epicondyle at the elbow, and behind the anterior axillar line at the axilla. The wound was then closed in 3 layers with 2/0 PDS for the superficial fascial layer, 3/0 Monocryl for deep dermal repair and 3/0 Stratafix for the subcuticular repair. 15 fr channel drain was left. The same procedure was performed on the other arm. At the end, symmetrical contour and size match was observed.   The wounds were dressed with incisional wound VAC.       Complications:  None; patient tolerated the procedure well.      Disposition: PACU - hemodynamically stable.    Condition: stable         Post Op Orders:  Can start oral diet and advance as tolerated. 24 hours of Ancef. Monitor drain output and wound VAC. Encourage early mobilization and can start DVT prophylaxis with weight-based Lovenox after 6-8 hours post surgery. CBC post surgery with transfusion if indicated.     Attending Attestation: I performed the procedure. Qualified resident physician was not available. Denton Castle served as my first assistant.     Crystal Gorman  Phone Number: [phone]

## 2024-03-30 VITALS
OXYGEN SATURATION: 99 % | WEIGHT: 198 LBS | RESPIRATION RATE: 18 BRPM | SYSTOLIC BLOOD PRESSURE: 131 MMHG | DIASTOLIC BLOOD PRESSURE: 74 MMHG | HEIGHT: 68 IN | TEMPERATURE: 96.6 F | HEART RATE: 84 BPM | BODY MASS INDEX: 30.01 KG/M2

## 2024-03-30 LAB
ANION GAP SERPL CALC-SCNC: 13 MMOL/L (ref 10–20)
BUN SERPL-MCNC: 8 MG/DL (ref 6–23)
CALCIUM SERPL-MCNC: 8.4 MG/DL (ref 8.6–10.6)
CHLORIDE SERPL-SCNC: 101 MMOL/L (ref 98–107)
CO2 SERPL-SCNC: 22 MMOL/L (ref 21–32)
CREAT SERPL-MCNC: 0.65 MG/DL (ref 0.5–1.05)
EGFRCR SERPLBLD CKD-EPI 2021: >90 ML/MIN/1.73M*2
ERYTHROCYTE [DISTWIDTH] IN BLOOD BY AUTOMATED COUNT: 14 % (ref 11.5–14.5)
GLUCOSE BLD MANUAL STRIP-MCNC: 108 MG/DL (ref 74–99)
GLUCOSE SERPL-MCNC: 68 MG/DL (ref 74–99)
HCT VFR BLD AUTO: 23.8 % (ref 36–46)
HGB BLD-MCNC: 7.9 G/DL (ref 12–16)
MCH RBC QN AUTO: 29.9 PG (ref 26–34)
MCHC RBC AUTO-ENTMCNC: 33.2 G/DL (ref 32–36)
MCV RBC AUTO: 90 FL (ref 80–100)
NRBC BLD-RTO: 0 /100 WBCS (ref 0–0)
PLATELET # BLD AUTO: 132 X10*3/UL (ref 150–450)
POTASSIUM SERPL-SCNC: 3.5 MMOL/L (ref 3.5–5.3)
RBC # BLD AUTO: 2.64 X10*6/UL (ref 4–5.2)
SODIUM SERPL-SCNC: 132 MMOL/L (ref 136–145)
WBC # BLD AUTO: 4.8 X10*3/UL (ref 4.4–11.3)

## 2024-03-30 PROCEDURE — 82947 ASSAY GLUCOSE BLOOD QUANT: CPT

## 2024-03-30 PROCEDURE — 36415 COLL VENOUS BLD VENIPUNCTURE: CPT | Performed by: NURSE PRACTITIONER

## 2024-03-30 PROCEDURE — 2500000001 HC RX 250 WO HCPCS SELF ADMINISTERED DRUGS (ALT 637 FOR MEDICARE OP): Performed by: NURSE PRACTITIONER

## 2024-03-30 PROCEDURE — 80048 BASIC METABOLIC PNL TOTAL CA: CPT | Performed by: NURSE PRACTITIONER

## 2024-03-30 PROCEDURE — 2500000004 HC RX 250 GENERAL PHARMACY W/ HCPCS (ALT 636 FOR OP/ED): Performed by: NURSE PRACTITIONER

## 2024-03-30 PROCEDURE — 99231 SBSQ HOSP IP/OBS SF/LOW 25: CPT | Performed by: STUDENT IN AN ORGANIZED HEALTH CARE EDUCATION/TRAINING PROGRAM

## 2024-03-30 PROCEDURE — 99024 POSTOP FOLLOW-UP VISIT: CPT

## 2024-03-30 PROCEDURE — 85027 COMPLETE CBC AUTOMATED: CPT | Performed by: NURSE PRACTITIONER

## 2024-03-30 PROCEDURE — 2500000004 HC RX 250 GENERAL PHARMACY W/ HCPCS (ALT 636 FOR OP/ED): Performed by: PHYSICIAN ASSISTANT

## 2024-03-30 PROCEDURE — 2500000001 HC RX 250 WO HCPCS SELF ADMINISTERED DRUGS (ALT 637 FOR MEDICARE OP): Performed by: PHYSICIAN ASSISTANT

## 2024-03-30 RX ORDER — TRAMADOL HYDROCHLORIDE 50 MG/1
50 TABLET ORAL EVERY 6 HOURS PRN
Qty: 20 TABLET | Refills: 0 | Status: SHIPPED | OUTPATIENT
Start: 2024-03-30 | End: 2024-04-04

## 2024-03-30 RX ORDER — GABAPENTIN 300 MG/1
300 CAPSULE ORAL 3 TIMES DAILY
Qty: 90 CAPSULE | Refills: 0 | Status: SHIPPED | OUTPATIENT
Start: 2024-03-30 | End: 2024-05-28 | Stop reason: SDUPTHER

## 2024-03-30 RX ORDER — DOCUSATE SODIUM 100 MG/1
100 CAPSULE, LIQUID FILLED ORAL 2 TIMES DAILY
Qty: 10 CAPSULE | Refills: 0 | Status: SHIPPED | OUTPATIENT
Start: 2024-03-30 | End: 2024-04-04

## 2024-03-30 RX ORDER — METHOCARBAMOL 500 MG/1
500 TABLET, FILM COATED ORAL 4 TIMES DAILY
Qty: 12 TABLET | Refills: 0 | Status: SHIPPED | OUTPATIENT
Start: 2024-03-30 | End: 2024-04-15 | Stop reason: SDUPTHER

## 2024-03-30 RX ORDER — ACETAMINOPHEN 325 MG/1
650 TABLET ORAL EVERY 6 HOURS PRN
Qty: 80 TABLET | Refills: 0 | Status: SHIPPED | OUTPATIENT
Start: 2024-03-30 | End: 2024-04-09

## 2024-03-30 RX ADMIN — TRAMADOL HYDROCHLORIDE 50 MG: 50 TABLET, COATED ORAL at 08:09

## 2024-03-30 RX ADMIN — GABAPENTIN 300 MG: 300 CAPSULE ORAL at 08:09

## 2024-03-30 RX ADMIN — ACETAMINOPHEN 650 MG: 325 TABLET ORAL at 06:42

## 2024-03-30 RX ADMIN — TRAMADOL HYDROCHLORIDE 50 MG: 50 TABLET, COATED ORAL at 01:54

## 2024-03-30 RX ADMIN — KETOROLAC TROMETHAMINE 30 MG: 30 INJECTION, SOLUTION INTRAMUSCULAR at 06:41

## 2024-03-30 RX ADMIN — ACETAMINOPHEN 650 MG: 325 TABLET ORAL at 00:31

## 2024-03-30 RX ADMIN — KETOROLAC TROMETHAMINE 30 MG: 30 INJECTION, SOLUTION INTRAMUSCULAR at 00:32

## 2024-03-30 RX ADMIN — ONDANSETRON 4 MG: 2 INJECTION INTRAMUSCULAR; INTRAVENOUS at 00:37

## 2024-03-30 RX ADMIN — ONDANSETRON 4 MG: 2 INJECTION INTRAMUSCULAR; INTRAVENOUS at 08:10

## 2024-03-30 RX ADMIN — METHOCARBAMOL 1000 MG: 100 INJECTION, SOLUTION INTRAMUSCULAR; INTRAVENOUS at 06:42

## 2024-03-30 ASSESSMENT — COGNITIVE AND FUNCTIONAL STATUS - GENERAL
TOILETING: A LITTLE
MOVING FROM LYING ON BACK TO SITTING ON SIDE OF FLAT BED WITH BEDRAILS: A LITTLE
MOBILITY SCORE: 18
DRESSING REGULAR LOWER BODY CLOTHING: A LITTLE
HELP NEEDED FOR BATHING: A LITTLE
EATING MEALS: A LITTLE
MOVING TO AND FROM BED TO CHAIR: A LITTLE
WALKING IN HOSPITAL ROOM: A LITTLE
DAILY ACTIVITIY SCORE: 18
PERSONAL GROOMING: A LITTLE
STANDING UP FROM CHAIR USING ARMS: A LITTLE
TURNING FROM BACK TO SIDE WHILE IN FLAT BAD: A LITTLE
CLIMB 3 TO 5 STEPS WITH RAILING: A LITTLE
DRESSING REGULAR UPPER BODY CLOTHING: A LITTLE

## 2024-03-30 ASSESSMENT — PAIN SCALES - GENERAL
PAINLEVEL_OUTOF10: 4
PAINLEVEL_OUTOF10: 4

## 2024-03-30 ASSESSMENT — PAIN - FUNCTIONAL ASSESSMENT: PAIN_FUNCTIONAL_ASSESSMENT: 0-10

## 2024-03-30 NOTE — DISCHARGE SUMMARY
Discharge Diagnosis  Encounter for breast reconstruction following mastectomy      Test Results Pending At Discharge  Pending Labs       No current pending labs.            Hospital Course  Cheyanne Rubio is a 53 y.o. female with a past medical history of pathogenic PALB2 gene mutation s/p bilateral mastectomy with immediate TRAM reconstruction and reconstruction of abdominal wall with mesh on 11/10/23. She is now s/p bilateral brachioplasty, bilateral breast revision with lateral chest liposuction and suction assisted lipectomy and skin excision of the lower abdomen with Dr. Jensen on 3/27/2024.   Patient required 1u prbc for hgb of 7.3 on 3/29. PT assessed on 3/29 and recommended no needs after discharge. Ambit catheters removed 3/30. ROBBY drains (x4) removed 3/30.     On day of discharge, vitals signs were stable. The patient was tolerating their diet, pain was controlled on PO pain medication, and they were ambulating and voiding spontaneously. They were discharged in stable condition with instructions to follow up as outpatient with Dr. Jensen          Pertinent Physical Exam At Time of Discharge    Pt states she feels well. No acute concerns. Mild dizziness upon standing/drain removal. Has been ambulating this morning without difficulty. Pain controlled. Denies fever, chest pain, SOB, abd pain, n/v/d.    Physical Exam  Constitutional: A&Ox3, calm and cooperative, NAD  Eyes: EOMI, clear sclera   ENMT: Moist mucous membranes, no apparent injuries or lesions  Head/Neck: NCAT  Cardiovascular: Normal rate and regular rhythm, 2+ equal pulses of the distal extremities  Respiratory/Thorax: Unlabored respirations on RA  Gastrointestinal: Abdomen soft, appropriately tender post operatively. Bilateral flank incisions with skin glue, C/D/I  Breasts: Bilateral breasts soft, warm to touch. Incisions with skin glue, C/D/I. Jpx1 to bilateral breasts with SS output.   Genitourinary: Voiding independently  Extremities: Prevena  incisional wound vacs intact to bilateral upper extremities with no leak or malfunction. Mild swelling of bilateral upper extremities, L>R. Compartments warm, soft and compressible. Motor and sensory intact. 2+ radial pulses bilaterally. Jpx1 to bilateral UE with SS output.   Neurological: A&Ox3  Psychological: Appropriate mood and behavior  Skin: Warm and dry with no lesions or rashes      Home Medications     Medication List      START taking these medications     docusate sodium 100 mg capsule; Commonly known as: Colace; Take 1   capsule (100 mg) by mouth 2 times a day for 5 days.   traMADol 50 mg tablet; Commonly known as: Ultram; Take 1 tablet (50 mg)   by mouth every 6 hours if needed for severe pain (7 - 10) for up to 5   days.     CHANGE how you take these medications     acetaminophen 325 mg tablet; Commonly known as: Tylenol; Take 2 tablets   (650 mg) by mouth every 6 hours if needed for mild pain (1 - 3) for up to   10 days.; What changed: medication strength, how much to take   gabapentin 300 mg capsule; Commonly known as: Neurontin; Take 1 capsule   (300 mg) by mouth 3 times a day.; What changed: when to take this   methocarbamol 500 mg tablet; Commonly known as: Robaxin; Take 1 tablet   (500 mg) by mouth 4 times a day for 3 days.; What changed: medication   strength, how much to take, Another medication with the same name was   removed. Continue taking this medication, and follow the directions you   see here.     CONTINUE taking these medications     brimonidine 0.025 % drops   CALTRATE 600 ORAL   fexofenadine 180 mg tablet; Commonly known as: Allegra   PRENATAL ORAL   UNABLE TO FIND   Vitamin D3 25 MCG (1000 UT) capsule; Generic drug: cholecalciferol     STOP taking these medications     aspirin 81 mg EC tablet   meloxicam 7.5 mg tablet; Commonly known as: Mobic       Outpatient Follow-Up  Future Appointments   Date Time Provider Department Center   4/8/2024  1:00 PM Crystal Gorman MD [id number]  [facility]   5/30/2024 11:00 AM Yvette Gonsales MD [id number] [facility]   6/28/2024  9:00 AM Shruthi Madison MD DOPrkPHY None   7/22/2024  8:00 AM Nicola Landaverde DO [id number] [facility]   9/12/2024  9:00 AM Lynda Barahona MD [id number] [facility]     WV connected to prevena  X4 ROBBY drains removed prior to DC  FUV 4/8      Regina Javed PA-C

## 2024-03-30 NOTE — HOSPITAL COURSE
Cheyanne Rubio is a 53 y.o. female with a past medical history of pathogenic PALB2 gene mutation s/p bilateral mastectomy with immediate TRAM reconstruction and reconstruction of abdominal wall with mesh on 11/10/23. She is now s/p bilateral brachioplasty, bilateral breast revision with lateral chest liposuction and suction assisted lipectomy and skin excision of the lower abdomen with Dr. Jensen on 3/27/2024.   Patient required 1u prbc for hgb of 7.3 on 3/29. PT assessed on 3/29 and recommended no needs after discharge. Ambit catheters removed 3/30. ROBBY drains (x4) removed 3/30.     On day of discharge, vitals signs were stable. The patient was tolerating their diet, pain was controlled on PO pain medication, and they were ambulating and voiding spontaneously. They were discharged in stable condition with instructions to follow up as outpatient with Dr. Jensen

## 2024-03-30 NOTE — CARE PLAN
Problem: Pain - Adult  Goal: Verbalizes/displays adequate comfort level or baseline comfort level  Outcome: Progressing     Problem: Safety - Adult  Goal: Free from fall injury  Outcome: Progressing     Problem: Discharge Planning  Goal: Discharge to home or other facility with appropriate resources  Outcome: Progressing     Problem: Chronic Conditions and Co-morbidities  Goal: Patient's chronic conditions and co-morbidity symptoms are monitored and maintained or improved  Outcome: Progressing     Problem: Pain  Goal: Takes deep breaths with improved pain control throughout the shift  Outcome: Progressing  Goal: Turns in bed with improved pain control throughout the shift  Outcome: Progressing  Goal: Walks with improved pain control throughout the shift  Outcome: Progressing  Goal: Performs ADL's with improved pain control throughout shift  Outcome: Progressing  Goal: Participates in PT with improved pain control throughout the shift  Outcome: Progressing  Goal: Free from opioid side effects throughout the shift  Outcome: Progressing  Goal: Free from acute confusion related to pain meds throughout the shift  Outcome: Progressing     Problem: Skin  Goal: Decreased wound size/increased tissue granulation at next dressing change  Outcome: Progressing  Flowsheets (Taken 3/30/2024 0746)  Decreased wound size/increased tissue granulation at next dressing change:   Promote sleep for wound healing   Protective dressings over bony prominences  Goal: Participates in plan/prevention/treatment measures  Outcome: Progressing  Flowsheets (Taken 3/30/2024 0746)  Participates in plan/prevention/treatment measures:   Discuss with provider PT/OT consult   Elevate heels  Goal: Prevent/manage excess moisture  Outcome: Progressing  Flowsheets (Taken 3/30/2024 0746)  Prevent/manage excess moisture:   Monitor for/manage infection if present   Moisturize dry skin   Follow provider orders for dressing changes  Goal: Prevent/minimize  sheer/friction injuries  Outcome: Progressing  Flowsheets (Taken 3/30/2024 0746)  Prevent/minimize sheer/friction injuries:   Use pull sheet   Increase activity/out of bed for meals   Turn/reposition every 2 hours/use positioning/transfer devices  Goal: Promote/optimize nutrition  Outcome: Progressing  Flowsheets (Taken 3/30/2024 0746)  Promote/optimize nutrition:   Monitor/record intake including meals   Offer water/supplements/favorite foods   Assist with feeding  Goal: Promote skin healing  Outcome: Progressing  Flowsheets (Taken 3/30/2024 0746)  Promote skin healing:   Turn/reposition every 2 hours/use positioning/transfer devices   Protective dressings over bony prominences   Assess skin/pad under line(s)/device(s)   The patient's goals for the shift include  Patient remain safe    The clinical goals for the shift include pt will remain HDS and free from injury

## 2024-03-30 NOTE — PROGRESS NOTES
Acute Pain Service    Cheyanne Rubio is a 53 y.o. year old female patient who presents for breast reconstruction revision w/ free flap bilateral dermatolipectomy abdomen excision lesion skin torso repair torso dermatolipectomy upper extremity bilateral with Dr. Gorman. Acute Pain consulted for block for postoperative pain control.     Postop Pain HPI -   Palliative: relieved with IV analgesics and regional local anesthetics  Provocative: movement  Quality:  burning and aching  Radiation:  none  Severity:  denies chest pain. Endorses arm pain   Timing: constant    24-HOUR OPIOID CONSUMPTION:  No opiates    Scheduled medications  acetaminophen, 650 mg, oral, q6h ADY  docusate sodium, 100 mg, oral, BID  [Held by provider] enoxaparin, 40 mg, subcutaneous, q24h  gabapentin, 300 mg, oral, TID  ketorolac, 30 mg, intravenous, q6h ADY  methocarbamol, 1,000 mg, intravenous, q8h  traMADol, 50 mg, oral, q6h      Continuous medications  ropivacaine (PF) in NS cmpd, 14 mL/hr, Last Rate: 14 mL/hr (03/28/24 1141)      PRN medications  PRN medications: diphenhydrAMINE, HYDROmorphone, magnesium hydroxide, ondansetron **OR** ondansetron     Physical Exam:  Constitutional:  no distress, alert and cooperative  Eyes: clear sclera  Head/Neck: No apparent injury, trachea midline  Respiratory/Thorax: Patent airways, thorax symmetric, breathing comfortably  Cardiovascular: no pitting edema  Gastrointestinal: Nondistended  Musculoskeletal: ROM intact  Extremities: no clubbing  Neurological: alert, perez x4  Psychological: Appropriate affect    Results for orders placed or performed during the hospital encounter of 03/27/24 (from the past 24 hour(s))   Prepare RBC: 1 Units   Result Value Ref Range    PRODUCT CODE N8372J16     Unit Number [id number]     Unit ABO B     Unit RH NEG     XM INTEP COMP     Dispense Status TR     Blood Expiration Date April 16, 2024 23:59 EDT     PRODUCT BLOOD TYPE 1700     UNIT VOLUME 350    Type and screen    Result Value Ref Range    ABO TYPE B     Rh TYPE NEG     ANTIBODY SCREEN NEG    CBC   Result Value Ref Range    WBC 4.8 4.4 - 11.3 x10*3/uL    nRBC 0.0 0.0 - 0.0 /100 WBCs    RBC 2.64 (L) 4.00 - 5.20 x10*6/uL    Hemoglobin 7.9 (L) 12.0 - 16.0 g/dL    Hematocrit 23.8 (L) 36.0 - 46.0 %    MCV 90 80 - 100 fL    MCH 29.9 26.0 - 34.0 pg    MCHC 33.2 32.0 - 36.0 g/dL    RDW 14.0 11.5 - 14.5 %    Platelets 132 (L) 150 - 450 x10*3/uL   Basic metabolic panel   Result Value Ref Range    Glucose 68 (L) 74 - 99 mg/dL    Sodium 132 (L) 136 - 145 mmol/L    Potassium 3.5 3.5 - 5.3 mmol/L    Chloride 101 98 - 107 mmol/L    Bicarbonate 22 21 - 32 mmol/L    Anion Gap 13 10 - 20 mmol/L    Urea Nitrogen 8 6 - 23 mg/dL    Creatinine 0.65 0.50 - 1.05 mg/dL    eGFR >90 >60 mL/min/1.73m*2    Calcium 8.4 (L) 8.6 - 10.6 mg/dL        Cheyanne Rubio is a 53 y.o. year old female patient who presents for breast reconstruction revision w/ free flap bilateral dermatolipectomy abdomen excision lesion skin torso repair torso dermatolipectomy upper extremity bilateral with Dr. Gorman. Acute Pain consulted for block for postoperative pain control.      Plan:     - Bilateral paravertebral T4 blocks with catheters performed preoperatively on 3/27/24  - Ambit catheters removed  - Acute pain service will sign off  - Rest of pain management per primary team    Acute Pain Resident  pg [phone] ph [phone]

## 2024-04-01 NOTE — PROGRESS NOTES
Subjective   Patient ID: Cheyanne Rubio is a 53 y.o. female presenting for post-operative visit.     HPI Cheyanne Rubio is a 53 y.o. female with a past medical history of pathogenic PALB2 gene mutation s/p bilateral mastectomy with immediate TRAM reconstruction and reconstruction of abdominal wall with mesh on 11/10/23. She is now s/p bilateral brachioplasty, bilateral breast revision with lateral chest liposuction and suction assisted lipectomy and skin excision of the lower abdomen on 3/27/2024.     Review of Systems   Constitutional:  Negative for activity change, appetite change, chills, fatigue and fever.   HENT:  Negative for congestion, facial swelling, rhinorrhea, sinus pain, sore throat and trouble swallowing.    Eyes:  Negative for pain, discharge and redness.   Respiratory:  Negative for apnea, cough, choking, chest tightness, shortness of breath and stridor.    Cardiovascular:  Negative for chest pain, palpitations and leg swelling.   Gastrointestinal:  Negative for abdominal distention, abdominal pain, constipation, diarrhea, nausea and vomiting.   Endocrine: Negative for cold intolerance, heat intolerance and polyuria.   Genitourinary:  Negative for difficulty urinating, dysuria, flank pain, frequency and urgency.   Musculoskeletal:  Negative for gait problem, joint swelling, myalgias, neck pain and neck stiffness.   Skin:         Surgical sites bruising.    Neurological:  Negative for dizziness, seizures, facial asymmetry and numbness.        Weak left hand , and decreased sensation in the distal lateral forearm and thumb   Hematological:  Negative for adenopathy. Bruises/bleeds easily.   Psychiatric/Behavioral: Negative.         Objective   Physical Exam  Vitals and nursing note reviewed. Exam conducted with a chaperone present.   Constitutional:       Appearance: Normal appearance.   Eyes:      Extraocular Movements: Extraocular movements intact.      Conjunctiva/sclera: Conjunctivae normal.       Pupils: Pupils are equal, round, and reactive to light.   Cardiovascular:      Rate and Rhythm: Normal rate and regular rhythm.   Pulmonary:      Effort: Pulmonary effort is normal.      Breath sounds: Normal breath sounds.   Chest:      Chest wall: No tenderness.      Comments: Breasts: Surgical wounds are intact. Bruises at the inferior pole ,bilaterally. Residual swelling at the lateral chest wall  Abdominal:      General: There is no distension.      Palpations: Abdomen is soft. There is no mass.      Tenderness: There is no guarding.      Hernia: No hernia is present.      Comments: Surgical wounds are intact. Significant bruises at her flanks (at different healing phases).    Musculoskeletal:         General: No deformity or signs of injury. Normal range of motion.      Cervical back: Normal range of motion and neck supple.      Right lower leg: No edema.      Left lower leg: No edema.      Comments: Intact surgical wound. Bruises along medial and posterior side of the arm, bilaterally.    Skin:     Capillary Refill: Capillary refill takes 2 to 3 seconds.      Findings: Bruising present. No erythema, lesion or rash.   Neurological:      General: No focal deficit present.      Mental Status: She is oriented to person, place, and time.   Psychiatric:         Mood and Affect: Mood normal.         Behavior: Behavior normal.         Thought Content: Thought content normal.         Judgment: Judgment normal.         Assessment/Plan   There are no diagnoses linked to this encounter.    Ms. Rubio is here for post-operative evaluation and surgical wound check. The VACs were taken down. Surgical wounds are all intact.   Bruises are still significant, but at different healing phases.     We discussed wound care.   We discussed wearing compression sleeves and compression garments for the arms and the abdomen for 6-8 weeks, with ABDs over the wounds for the next week.   We discussed scar care which can start 2 weeks form  today with Aquaphor and silicone sheets.   Weight lifting restrictions, no more than 5 pounds per side, for a total of 6 weeks.   F/U: After 2 weeks.

## 2024-04-03 ENCOUNTER — OFFICE VISIT (OUTPATIENT)
Dept: PLASTIC SURGERY | Facility: CLINIC | Age: 54
End: 2024-04-03
Payer: COMMERCIAL

## 2024-04-03 VITALS
WEIGHT: 196 LBS | OXYGEN SATURATION: 98 % | HEIGHT: 67 IN | SYSTOLIC BLOOD PRESSURE: 169 MMHG | BODY MASS INDEX: 30.76 KG/M2 | TEMPERATURE: 97.9 F | HEART RATE: 73 BPM | DIASTOLIC BLOOD PRESSURE: 94 MMHG

## 2024-04-03 DIAGNOSIS — Z48.89 ENCOUNTER FOR POSTOPERATIVE WOUND CHECK: Primary | ICD-10-CM

## 2024-04-03 DIAGNOSIS — Z48.89 ENCOUNTER FOR FOLLOW-UP CARE INVOLVING PLASTIC SURGERY: ICD-10-CM

## 2024-04-03 PROCEDURE — 99024 POSTOP FOLLOW-UP VISIT: CPT

## 2024-04-03 PROCEDURE — 1036F TOBACCO NON-USER: CPT

## 2024-04-03 RX ORDER — IBUPROFEN 800 MG/1
800 TABLET ORAL EVERY 8 HOURS PRN
Qty: 21 TABLET | Refills: 1 | Status: SHIPPED | OUTPATIENT
Start: 2024-04-03 | End: 2024-04-17

## 2024-04-03 ASSESSMENT — ENCOUNTER SYMPTOMS
DIZZINESS: 0
MYALGIAS: 0
EYE REDNESS: 0
LOSS OF SENSATION IN FEET: 0
DIZZINESS: 0
DEPRESSION: 0
NUMBNESS: 0
ACTIVITY CHANGE: 0
SORE THROAT: 0
ADENOPATHY: 0
JOINT SWELLING: 0
JOINT SWELLING: 0
ABDOMINAL DISTENTION: 0
PALPITATIONS: 0
CHEST TIGHTNESS: 0
APNEA: 0
TROUBLE SWALLOWING: 0
FACIAL ASYMMETRY: 0
SHORTNESS OF BREATH: 0
SEIZURES: 0
CHILLS: 0
CONSTIPATION: 0
NECK PAIN: 0
FREQUENCY: 0
NUMBNESS: 0
OCCASIONAL FEELINGS OF UNSTEADINESS: 0
DIARRHEA: 0
STRIDOR: 0
FREQUENCY: 0
FACIAL ASYMMETRY: 0
EYE DISCHARGE: 0
MYALGIAS: 0
CHOKING: 0
PALPITATIONS: 0
RHINORRHEA: 0
STRIDOR: 0
FLANK PAIN: 0
EYE PAIN: 0
NECK STIFFNESS: 0
EYE PAIN: 0
PSYCHIATRIC NEGATIVE: 1
FATIGUE: 0
BRUISES/BLEEDS EASILY: 1
FLANK PAIN: 0
EYE REDNESS: 0
SHORTNESS OF BREATH: 0
APNEA: 0
EYE DISCHARGE: 0
COUGH: 0
APPETITE CHANGE: 0
APPETITE CHANGE: 0
SINUS PAIN: 0
CONSTIPATION: 0
VOMITING: 0
DYSURIA: 0
NAUSEA: 0
CHOKING: 0
SORE THROAT: 0
ADENOPATHY: 0
TROUBLE SWALLOWING: 0
FACIAL SWELLING: 0
FEVER: 0
PSYCHIATRIC NEGATIVE: 1
FATIGUE: 0
DIFFICULTY URINATING: 0
SEIZURES: 0
FEVER: 0
COUGH: 0
ABDOMINAL PAIN: 0
ACTIVITY CHANGE: 0
SINUS PAIN: 0
CHILLS: 0
DIFFICULTY URINATING: 0
NAUSEA: 0
ABDOMINAL PAIN: 0
NECK STIFFNESS: 0
RHINORRHEA: 0
FACIAL SWELLING: 0
CHEST TIGHTNESS: 0
DIARRHEA: 0
NECK PAIN: 0
ABDOMINAL DISTENTION: 0
VOMITING: 0
DYSURIA: 0

## 2024-04-03 ASSESSMENT — PAIN SCALES - GENERAL: PAINLEVEL: 2

## 2024-04-03 NOTE — PROGRESS NOTES
Subjective   Patient ID: Cheyanne Rubio is a 53 y.o. female presenting for post-operative visit.     HPI Cheyanne Rubio is a 53 y.o. female with a past medical history of pathogenic PALB2 gene mutation s/p bilateral mastectomy with immediate TRAM reconstruction and reconstruction of abdominal wall with mesh on 11/10/23. She is now s/p bilateral brachioplasty, bilateral breast revision with lateral chest liposuction and suction assisted lipectomy and skin excision of the lower abdomen on 3/27/2024.     Review of Systems   Constitutional:  Negative for activity change, appetite change, chills, fatigue and fever.   HENT:  Negative for congestion, facial swelling, rhinorrhea, sinus pain, sore throat and trouble swallowing.    Eyes:  Negative for pain, discharge and redness.   Respiratory:  Negative for apnea, cough, choking, chest tightness, shortness of breath and stridor.    Cardiovascular:  Negative for chest pain, palpitations and leg swelling.   Gastrointestinal:  Negative for abdominal distention, abdominal pain, constipation, diarrhea, nausea and vomiting.   Endocrine: Negative for cold intolerance, heat intolerance and polyuria.   Genitourinary:  Negative for difficulty urinating, dysuria, flank pain, frequency and urgency.   Musculoskeletal:  Negative for gait problem, joint swelling, myalgias, neck pain and neck stiffness.   Skin:  Negative for color change, pallor and rash.   Neurological:  Negative for dizziness, tremors, seizures, facial asymmetry and numbness.        Weak left hand , and decreased sensation in the distal lateral forearm and thumb   Hematological:  Negative for adenopathy. Bruises/bleeds easily.   Psychiatric/Behavioral: Negative.         Objective   Physical Exam  Vitals and nursing note reviewed. Exam conducted with a chaperone present.   Constitutional:       Appearance: Normal appearance.   Eyes:      Extraocular Movements: Extraocular movements intact.      Conjunctiva/sclera:  Conjunctivae normal.      Pupils: Pupils are equal, round, and reactive to light.   Cardiovascular:      Rate and Rhythm: Normal rate and regular rhythm.   Pulmonary:      Effort: Pulmonary effort is normal.      Breath sounds: Normal breath sounds.   Chest:      Chest wall: No swelling or tenderness.   Breasts:     Breasts are symmetrical.   Abdominal:      General: There is no distension.      Palpations: Abdomen is soft. There is no mass.      Tenderness: There is no guarding.      Hernia: No hernia is present.   Musculoskeletal:         General: No deformity or signs of injury. Normal range of motion.      Cervical back: Normal range of motion and neck supple.      Right lower leg: No edema.      Left lower leg: No edema.   Skin:     Capillary Refill: Capillary refill takes 2 to 3 seconds.      Findings: No bruising, erythema, lesion or rash.      Comments: Well healed surgical incisions except for superficial skin breakdown measuring 1x.03 cm at the right brachioplasty incision with minimal discharge.    Neurological:      General: No focal deficit present.      Mental Status: She is oriented to person, place, and time.   Psychiatric:         Mood and Affect: Mood normal.         Behavior: Behavior normal.         Thought Content: Thought content normal.         Judgment: Judgment normal.         Assessment/Plan   Diagnoses and all orders for this visit:  Encounter for postoperative wound check  Encounter for follow-up care involving plastic surgery    I had the pleasure of seeing Ms. Rubio today. She is here for post-operative evaluation and surgical wound check.     We discussed wound care. Please use aquacel ag on 1cm x.5cm area of superficial breakdown on right arm   Continue wearing compression sleeves and compression garments for the arms and the abdomen for 6-8 weeks  Can start using aquaphor and silicone sheets on all wounds except the area on right arm where we are applying wound care.  Post-surgical  lymphedema massage at the abdomen and both arms.   Weight lifting restrictions, no more than 5 pounds per side, for a total of 8 weeks.   Refill robaxin, ibuprofen, and bacitracin  F/U: After 8 weeks.

## 2024-04-08 ENCOUNTER — APPOINTMENT (OUTPATIENT)
Dept: PLASTIC SURGERY | Facility: CLINIC | Age: 54
End: 2024-04-08
Payer: COMMERCIAL

## 2024-04-12 DIAGNOSIS — N65.1 BREAST ASYMMETRY FOLLOWING RECONSTRUCTIVE SURGERY: ICD-10-CM

## 2024-04-12 DIAGNOSIS — R20.0 NUMBNESS AND TINGLING OF LEFT THUMB: ICD-10-CM

## 2024-04-12 DIAGNOSIS — R20.2 NUMBNESS AND TINGLING OF LEFT THUMB: ICD-10-CM

## 2024-04-12 DIAGNOSIS — Z98.890 STATUS POST BREAST RECONSTRUCTION: ICD-10-CM

## 2024-04-15 ENCOUNTER — OFFICE VISIT (OUTPATIENT)
Dept: PLASTIC SURGERY | Facility: CLINIC | Age: 54
End: 2024-04-15
Payer: COMMERCIAL

## 2024-04-15 VITALS
BODY MASS INDEX: 30.13 KG/M2 | DIASTOLIC BLOOD PRESSURE: 87 MMHG | RESPIRATION RATE: 16 BRPM | SYSTOLIC BLOOD PRESSURE: 164 MMHG | WEIGHT: 192 LBS | HEIGHT: 67 IN | HEART RATE: 71 BPM

## 2024-04-15 DIAGNOSIS — N65.1 BREAST ASYMMETRY FOLLOWING RECONSTRUCTIVE SURGERY: ICD-10-CM

## 2024-04-15 DIAGNOSIS — G89.18 POST-OPERATIVE PAIN: ICD-10-CM

## 2024-04-15 DIAGNOSIS — Z98.890 STATUS POST BREAST RECONSTRUCTION: ICD-10-CM

## 2024-04-15 DIAGNOSIS — Z48.89 ENCOUNTER FOR POSTOPERATIVE WOUND CHECK: ICD-10-CM

## 2024-04-15 DIAGNOSIS — Z48.89 ENCOUNTER FOR FOLLOW-UP CARE INVOLVING PLASTIC SURGERY: ICD-10-CM

## 2024-04-15 PROCEDURE — 99024 POSTOP FOLLOW-UP VISIT: CPT

## 2024-04-15 PROCEDURE — 1036F TOBACCO NON-USER: CPT

## 2024-04-15 RX ORDER — METHOCARBAMOL 500 MG/1
500 TABLET, FILM COATED ORAL 4 TIMES DAILY PRN
Qty: 56 TABLET | Refills: 0 | Status: SHIPPED | OUTPATIENT
Start: 2024-04-15 | End: 2024-05-28 | Stop reason: SDUPTHER

## 2024-04-15 RX ORDER — IBUPROFEN 600 MG/1
600 TABLET ORAL EVERY 6 HOURS PRN
Qty: 120 TABLET | Refills: 1 | Status: SHIPPED | OUTPATIENT
Start: 2024-04-15 | End: 2024-05-15

## 2024-04-15 RX ORDER — BACITRACIN 500 [USP'U]/G
OINTMENT TOPICAL
Qty: 14 G | Refills: 0 | Status: SHIPPED | OUTPATIENT
Start: 2024-04-15 | End: 2025-04-15

## 2024-04-15 ASSESSMENT — PAIN SCALES - GENERAL: PAINLEVEL: 0-NO PAIN

## 2024-04-15 ASSESSMENT — ENCOUNTER SYMPTOMS
COLOR CHANGE: 0
TREMORS: 0
BRUISES/BLEEDS EASILY: 1

## 2024-04-15 NOTE — DOCUMENTATION CLARIFICATION NOTE
PATIENT:               CHEYANNE RUBIO  ACCT #:                  [id number]  MRN:                       [id number]  :                       1970  ADMIT DATE:       3/27/2024 6:30 AM  DISCH DATE:        3/30/2024 11:48 AM  RESPONDING PROVIDER #:        [id number]          PROVIDER RESPONSE TEXT:    Acute blood loss anemia related to surgery    CDI QUERY TEXT:    Clarification    Instruction:    Based on your assessment of the patient and the clinical information, please provide the requested documentation by clicking on the appropriate radio button and enter any additional information if prompted.    Question: Please further clarify the diagnosis of anemia as    When answering this query, please exercise your independent professional judgment. The fact that a question is being asked, does not imply that any particular answer is desired or expected.    The patient's clinical indicators include:  Clinical Information:  Per the 3/29/24 Plastic Surgery Progress Note:  ?Assessment: Cheyanne Rubio is a 53 y.o. female with a past medical history of pathogenic PALB2 gene mutation s/p bilateral mastectomy with immediate TRAM reconstruction and reconstruction of abdominal wall with mesh on 11/10/23. She is now s/p bilateral brachioplasty, bilateral breast revision with lateral chest liposuction and suction assisted lipectomy and skin excision of the lower abdomen with Dr. Gorman on 3/27/2024.?    Clinical Indicators:  Per the 3/29/24 Plastic Surgery Progress Note:  ?Anemia  - Hgb dropped from 11.1 on 3/27 to 7.3 3/29, pt endorses fatigue  - 1 unit PRBCs ordered 3/29  - Monitor post-transfusion CBC for improvement in Hgb?    Labs:  3/27/24: Erythrocytes-3.65, H/H-11.1/32.5  3/29/24: Erythrocytes-2.68, H/H-7.3/22.2  3/30/24: Erythrocytes-2.36, H/H-7.9/23.8  EBL on 3/27/24: 200mL    Labs on 3/27/24: T-36.4, HR-77, RR-18, BP-138/77, POX-96% room air    Treatment: Monitor labs/vital signs; administered one unit of packed red blood  cells on 3/29/24    Risk Factors: EBL-200 mL, fatigue; history of pathogenic PALB2 gene mutation s/p bilateral mastectomy; s/p surgery on 3/27/24; no smoking, current alcohol use, no drug use (3/27/24 Consult Note)  Options provided:  -- Iron deficiency anemia  -- Acute blood loss anemia related to surgery  -- Macrocytic anemia  -- Anemia due to chronic disease, Please specify chronic disease below  -- Other - I will add my own diagnosis  -- Refer to Clinical Documentation Reviewer    Query created by: Nancy Rocha on 4/10/2024 8:52 AM      Electronically signed by:  PHILIP MUNGUIA PA-C 4/15/2024 3:29 PM

## 2024-05-22 ASSESSMENT — ENCOUNTER SYMPTOMS
CHOKING: 0
NAUSEA: 0
APPETITE CHANGE: 0
ABDOMINAL PAIN: 0
NECK PAIN: 0
SINUS PAIN: 0
EYE PAIN: 0
EYE REDNESS: 0
SEIZURES: 0
NECK STIFFNESS: 0
DIZZINESS: 0
MYALGIAS: 0
SORE THROAT: 0
ABDOMINAL DISTENTION: 0
DYSURIA: 0
CHILLS: 0
TREMORS: 0
SHORTNESS OF BREATH: 0
TROUBLE SWALLOWING: 0
ADENOPATHY: 0
DIFFICULTY URINATING: 0
NUMBNESS: 0
FLANK PAIN: 0
FREQUENCY: 0
COUGH: 0
COLOR CHANGE: 0
RHINORRHEA: 0
PALPITATIONS: 0
EYE DISCHARGE: 0
STRIDOR: 0
FACIAL ASYMMETRY: 0
JOINT SWELLING: 0
FATIGUE: 0
APNEA: 0
VOMITING: 0
DIARRHEA: 0
CONSTIPATION: 0
ACTIVITY CHANGE: 0
CHEST TIGHTNESS: 0
FEVER: 0
PSYCHIATRIC NEGATIVE: 1
FACIAL SWELLING: 0
BRUISES/BLEEDS EASILY: 1

## 2024-05-23 NOTE — PROGRESS NOTES
Subjective   Patient ID: Cheyanne Rubio is a 53 y.o. female presenting for post-operative visit.     HPI Cheyanne Rubio is a 53 y.o. female with a past medical history of pathogenic PALB2 gene mutation s/p bilateral mastectomy with immediate TRAM reconstruction and reconstruction of abdominal wall with mesh on 11/10/23. She is now s/p bilateral brachioplasty, bilateral breast revision with lateral chest liposuction and suction assisted lipectomy and skin excision of the lower abdomen on 3/27/2024.     Review of Systems   Constitutional:  Negative for activity change, appetite change, chills, fatigue and fever.   HENT:  Negative for congestion, facial swelling, rhinorrhea, sinus pain, sore throat and trouble swallowing.    Eyes:  Negative for pain, discharge and redness.   Respiratory:  Negative for apnea, cough, choking, chest tightness, shortness of breath and stridor.    Cardiovascular:  Negative for chest pain, palpitations and leg swelling.   Gastrointestinal:  Negative for abdominal distention, abdominal pain, constipation, diarrhea, nausea and vomiting.   Endocrine: Negative for cold intolerance, heat intolerance and polyuria.   Genitourinary:  Negative for difficulty urinating, dysuria, flank pain, frequency and urgency.   Musculoskeletal:  Negative for gait problem, joint swelling, myalgias, neck pain and neck stiffness.   Skin:  Negative for color change, pallor and rash.   Neurological:  Negative for dizziness, tremors, seizures, facial asymmetry and numbness.        Weak left hand , and decreased sensation in the distal lateral forearm and thumb   Hematological:  Negative for adenopathy. Bruises/bleeds easily.   Psychiatric/Behavioral: Negative.         Objective   Physical Exam  Vitals and nursing note reviewed. Exam conducted with a chaperone present.   Constitutional:       Appearance: Normal appearance.   Eyes:      Extraocular Movements: Extraocular movements intact.      Conjunctiva/sclera:  Conjunctivae normal.      Pupils: Pupils are equal, round, and reactive to light.   Cardiovascular:      Rate and Rhythm: Normal rate and regular rhythm.   Pulmonary:      Effort: Pulmonary effort is normal.      Breath sounds: Normal breath sounds.   Chest:      Chest wall: No swelling or tenderness.   Breasts:     Breasts are symmetrical.   Abdominal:      General: There is no distension.      Palpations: Abdomen is soft. There is no mass.      Tenderness: There is no guarding.      Hernia: No hernia is present.   Musculoskeletal:         General: No deformity or signs of injury. Normal range of motion.      Cervical back: Normal range of motion and neck supple.      Right lower leg: No edema.      Left lower leg: No edema.   Skin:     Capillary Refill: Capillary refill takes 2 to 3 seconds.      Findings: No bruising, erythema, lesion or rash.      Comments: Well healed surgical incisions except for superficial skin breakdown measuring 1x.03 cm at the right brachioplasty incision with minimal discharge.    Neurological:      General: No focal deficit present.      Mental Status: She is oriented to person, place, and time.   Psychiatric:         Mood and Affect: Mood normal.         Behavior: Behavior normal.         Thought Content: Thought content normal.         Judgment: Judgment normal.       Assessment/Plan   There are no diagnoses linked to this encounter.    I had the pleasure of seeing Ms. Rubio today. She is here for post-operative evaluation and surgical wound check.     We discussed wound care. Please use aquacel ag on 1cm x.5cm area of superficial breakdown on right arm   Continue wearing compression sleeves and compression garments for the arms and the abdomen for 6-8 weeks  Can start using aquaphor and silicone sheets on all wounds except the area on right arm where we are applying wound care.  Post-surgical lymphedema massage at the abdomen and both arms.   Weight lifting restrictions, no more than 5  pounds per side, for a total of 8 weeks.   Refill robaxin, ibuprofen, and bacitracin  F/U: After 8 weeks.

## 2024-05-28 DIAGNOSIS — G89.18 POST-OPERATIVE PAIN: ICD-10-CM

## 2024-05-28 DIAGNOSIS — N65.1 BREAST ASYMMETRY FOLLOWING RECONSTRUCTIVE SURGERY: ICD-10-CM

## 2024-05-28 DIAGNOSIS — Z48.89 ENCOUNTER FOR POSTOPERATIVE WOUND CHECK: Primary | ICD-10-CM

## 2024-05-28 RX ORDER — CEPHALEXIN 500 MG/1
500 CAPSULE ORAL 3 TIMES DAILY
Qty: 21 CAPSULE | Refills: 0 | Status: SHIPPED | OUTPATIENT
Start: 2024-05-28 | End: 2024-06-04

## 2024-05-28 RX ORDER — IBUPROFEN 800 MG/1
800 TABLET ORAL EVERY 8 HOURS PRN
Qty: 21 TABLET | Refills: 0 | Status: SHIPPED | OUTPATIENT
Start: 2024-05-28 | End: 2024-06-04

## 2024-05-28 RX ORDER — GABAPENTIN 300 MG/1
300 CAPSULE ORAL 3 TIMES DAILY
Qty: 90 CAPSULE | Refills: 0 | Status: SHIPPED | OUTPATIENT
Start: 2024-05-28 | End: 2024-06-27

## 2024-05-28 RX ORDER — METHOCARBAMOL 500 MG/1
500 TABLET, FILM COATED ORAL 4 TIMES DAILY PRN
Qty: 56 TABLET | Refills: 0 | Status: SHIPPED | OUTPATIENT
Start: 2024-05-28 | End: 2024-06-11

## 2024-05-30 ENCOUNTER — OFFICE VISIT (OUTPATIENT)
Dept: GYNECOLOGIC ONCOLOGY | Facility: CLINIC | Age: 54
End: 2024-05-30
Payer: COMMERCIAL

## 2024-05-30 VITALS
SYSTOLIC BLOOD PRESSURE: 127 MMHG | DIASTOLIC BLOOD PRESSURE: 80 MMHG | BODY MASS INDEX: 29.93 KG/M2 | WEIGHT: 190.7 LBS | HEIGHT: 67 IN | OXYGEN SATURATION: 99 % | HEART RATE: 59 BPM | TEMPERATURE: 97.4 F

## 2024-05-30 DIAGNOSIS — Z87.42 HISTORY OF ENDOMETRIOSIS: Primary | ICD-10-CM

## 2024-05-30 DIAGNOSIS — N93.9 ABNORMAL UTERINE BLEEDING (AUB): ICD-10-CM

## 2024-05-30 DIAGNOSIS — N95.1 CLIMACTERIC: ICD-10-CM

## 2024-05-30 DIAGNOSIS — Z98.890 STATUS POST BILATERAL BREAST RECONSTRUCTION: ICD-10-CM

## 2024-05-30 DIAGNOSIS — Z15.89 PALB2 GENE MUTATION POSITIVE: ICD-10-CM

## 2024-05-30 LAB
ALBUMIN SERPL BCP-MCNC: 4.8 G/DL (ref 3.4–5)
ALP SERPL-CCNC: 86 U/L (ref 33–110)
ALT SERPL W P-5'-P-CCNC: 12 U/L (ref 7–45)
ANION GAP SERPL CALC-SCNC: 13 MMOL/L (ref 10–20)
AST SERPL W P-5'-P-CCNC: 19 U/L (ref 9–39)
BILIRUB SERPL-MCNC: 0.4 MG/DL (ref 0–1.2)
BUN SERPL-MCNC: 12 MG/DL (ref 6–23)
CALCIUM SERPL-MCNC: 9.8 MG/DL (ref 8.6–10.3)
CHLORIDE SERPL-SCNC: 106 MMOL/L (ref 98–107)
CO2 SERPL-SCNC: 28 MMOL/L (ref 21–32)
CREAT SERPL-MCNC: 0.73 MG/DL (ref 0.5–1.05)
EGFRCR SERPLBLD CKD-EPI 2021: >90 ML/MIN/1.73M*2
GLUCOSE SERPL-MCNC: 95 MG/DL (ref 74–99)
POTASSIUM SERPL-SCNC: 4.5 MMOL/L (ref 3.5–5.3)
PROT SERPL-MCNC: 7.4 G/DL (ref 6.4–8.2)
SODIUM SERPL-SCNC: 142 MMOL/L (ref 136–145)

## 2024-05-30 PROCEDURE — 99205 OFFICE O/P NEW HI 60 MIN: CPT | Performed by: STUDENT IN AN ORGANIZED HEALTH CARE EDUCATION/TRAINING PROGRAM

## 2024-05-30 PROCEDURE — 99215 OFFICE O/P EST HI 40 MIN: CPT | Performed by: STUDENT IN AN ORGANIZED HEALTH CARE EDUCATION/TRAINING PROGRAM

## 2024-05-30 PROCEDURE — 84075 ASSAY ALKALINE PHOSPHATASE: CPT | Performed by: STUDENT IN AN ORGANIZED HEALTH CARE EDUCATION/TRAINING PROGRAM

## 2024-05-30 PROCEDURE — 1036F TOBACCO NON-USER: CPT | Performed by: STUDENT IN AN ORGANIZED HEALTH CARE EDUCATION/TRAINING PROGRAM

## 2024-05-30 ASSESSMENT — COLUMBIA-SUICIDE SEVERITY RATING SCALE - C-SSRS
1. IN THE PAST MONTH, HAVE YOU WISHED YOU WERE DEAD OR WISHED YOU COULD GO TO SLEEP AND NOT WAKE UP?: NO
2. HAVE YOU ACTUALLY HAD ANY THOUGHTS OF KILLING YOURSELF?: NO
6. HAVE YOU EVER DONE ANYTHING, STARTED TO DO ANYTHING, OR PREPARED TO DO ANYTHING TO END YOUR LIFE?: NO

## 2024-05-30 ASSESSMENT — PATIENT HEALTH QUESTIONNAIRE - PHQ9
1. LITTLE INTEREST OR PLEASURE IN DOING THINGS: NOT AT ALL
2. FEELING DOWN, DEPRESSED OR HOPELESS: NOT AT ALL
SUM OF ALL RESPONSES TO PHQ9 QUESTIONS 1 AND 2: 0

## 2024-05-30 ASSESSMENT — PAIN SCALES - GENERAL: PAINLEVEL: 4

## 2024-05-30 ASSESSMENT — ENCOUNTER SYMPTOMS
DEPRESSION: 0
LOSS OF SENSATION IN FEET: 0
OCCASIONAL FEELINGS OF UNSTEADINESS: 0

## 2024-05-30 NOTE — PROGRESS NOTES
"  Gynecologic Oncology Initial Consultation    Patient ID: Cheyanne Rubio is a 53 y.o. female.  Referring Physician: Lynda Barahona MD  [id number]  [place] [address]  Primary Care Provider: No Assigned PCP Generic Provider, MD      Reason for Consultation: Pathogenic PALB2 mutation      Subjective    52yo  presenting in GYN Oncology consultation regarding pathogenic PALB2 mutation. She underwent recent bilateral mastectomy with immediate TRAM reconstruction and reconstruction of abdominal wall with mesh on 11/10/23 - protracted postoperative course with subsequent revision. She reports history of dysmenorrhea and infertility 2/2 endometriosis; otherwise no significant gynecologic complaints. Reports increasingly symptomatic sleep disturbances and hot flashes over past several months with most recent menstrual cycle in October. Denies nausea/vomiting, fevers, chills, chest pain or shortness of breath. No abnormal vaginal discharge. No other gynecologic concerns/complaints.     A comprehensive review of systems was performed and otherwise negative.    Objective   BSA: 2.03 meters squared  /80 (BP Location: Right arm, Patient Position: Sitting, BP Cuff Size: Adult long)   Pulse 59   Temp 36.3 °C (97.4 °F)   Ht (S) 1.712 m (5' 7.4\")   Wt 86.5 kg (190 lb 11.2 oz)   SpO2 99%   BMI 29.51 kg/m²     PMH: dysmenorrhea; history of thyroid nodule  PSH: bilateral risk reducing mastectomy with   Past Surgical History:   Procedure Laterality Date    CT ABDOMEN PELVIS ANGIOGRAM W AND/OR WO IV CONTRAST  2023    CT ABDOMEN PELVIS ANGIOGRAM W AND/OR WO IV CONTRAST 2023 [facility] CT    DILATION AND CURETTAGE OF UTERUS  10/20/2014    Dilation And Curettage    EXPLORATORY LAPAROTOMY      HYSTEROSCOPY      LAPAROSCOPY DIAGNOSTIC / BIOPSY / ASPIRATION / LYSIS  10/20/2014    Exploratory Laparoscopy    OTHER SURGICAL HISTORY  10/20/2014    Hysteroscopy        Family History "   Problem Relation Name Age of Onset    Hypertension Mother      Pancreatic cancer Mother      Bone cancer Father      Pancreatic cancer Father      Prostate cancer Father      Rheum arthritis Maternal Grandmother      Breast cancer Other aunt      OB/GYN Hx: ; symptomatic climacteric   - Extensive history of IVF, IUI with 4mo pregnancy loss  - Last Pap ; denies prior abnormal     Cheyanne Ramiro  reports that she has never smoked. She has never been exposed to tobacco smoke. She has never used smokeless tobacco.  She  reports current alcohol use of about 2.0 standard drinks of alcohol per week.  She  reports no history of drug use.  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 ECOG 0; ongoing recovery in setting of recent breast reconstruction s/p RR bilateral mastectomy. History of dysmenorrhea c/w endometriosis.     Diagnoses and all orders for this visit:  PALB2 gene mutation positive  History of endometriosis  - Pathogenic PALB2 mutation with history of prior abdominal wall reconstruction with midline mesh placement ([id number] per operative note) s/p TRAM. Patient at increased risk of intraabdominal adhesions in setting of endometriosis, abdominal wall surgeries.  - We discussed that there is a 3-5% lifetime risk of developing ovarian cancer with a PALB2 mutation. Risk-reducing bilateral salpingo-oophorectomy may be considered as an option for ovarian cancer prevention and patient would like to pursue  all options to reduce her individualized risk. She was counseled that there still remains a risk, albeit low, of the development of primary peritoneal cancer after surgical risk reduction and ongoing symptom monitoring is recommended post-BSO.   - Wishes to defer surgical procedure until later in Summer/Fall as she continues to recover from current procedure. Due to extensive nature of prior surgical procedure will recommend consideration for TVUS, pelvic MRI close to procedure. Follow up in office pending coordination of future OR date; to be seen 4-6 weeks prior to planned procedure for consent/risk counseling.   -     US pelvis transvaginal; Future  Climacteric  - Discussed indications for HRT including symptomatic climacteric; discussed management options including conservative treatment/thermoregulation, non-hormonal agents as well as hormone directed therapies.   - Patient elects to proceed with oz for climacteric relief   -     fezolinetant 45 mg tablet; Take 45 mg by mouth once daily.  -     Comprehensive metabolic panel; Future    Yvette Gonsales MD

## 2024-06-03 ENCOUNTER — OFFICE VISIT (OUTPATIENT)
Dept: PLASTIC SURGERY | Facility: CLINIC | Age: 54
End: 2024-06-03
Payer: COMMERCIAL

## 2024-06-03 VITALS
DIASTOLIC BLOOD PRESSURE: 83 MMHG | SYSTOLIC BLOOD PRESSURE: 115 MMHG | HEART RATE: 96 BPM | WEIGHT: 190 LBS | HEIGHT: 67 IN | BODY MASS INDEX: 29.82 KG/M2

## 2024-06-03 DIAGNOSIS — Z48.89 ENCOUNTER FOR POSTOPERATIVE WOUND CHECK: Primary | ICD-10-CM

## 2024-06-03 PROCEDURE — 99024 POSTOP FOLLOW-UP VISIT: CPT | Performed by: PHYSICIAN ASSISTANT

## 2024-06-03 NOTE — PROGRESS NOTES
Subjective   Patient ID: Cheyanne Rubio is a 53 y.o. female presenting for post-operative visit.     HPI Cheyanne Rubio is a 53 y.o. female with a past medical history of pathogenic PALB2 gene mutation s/p bilateral mastectomy with immediate TRAM reconstruction and reconstruction of abdominal wall with mesh on 11/10/23. She is now s/p bilateral brachioplasty, bilateral breast revision with lateral chest liposuction and suction assisted lipectomy and skin excision of the lower abdomen on 3/27/2024. Her Brachioplasty post operative course was complicated by left arm cellulitis since resolved with Keflex TID for which she has 2 days left. There are small areas of dehiscence which have been draining for 3 weeks which have been stable.    Review of Systems   Constitutional:  Negative for activity change, appetite change, chills, fatigue and fever.   HENT:  Negative for congestion, facial swelling, rhinorrhea, sinus pain, sore throat and trouble swallowing.    Eyes:  Negative for pain, discharge and redness.   Respiratory:  Negative for apnea, cough, choking, chest tightness, shortness of breath and stridor.    Cardiovascular:  Negative for chest pain, palpitations and leg swelling.   Gastrointestinal:  Negative for abdominal distention, abdominal pain, constipation, diarrhea, nausea and vomiting.   Endocrine: Negative for cold intolerance, heat intolerance and polyuria.   Genitourinary:  Negative for difficulty urinating, dysuria, flank pain, frequency and urgency.   Musculoskeletal:  Negative for gait problem, joint swelling, myalgias, neck pain and neck stiffness.   Skin:  Negative for color change, pallor and rash.   Neurological:  Negative for dizziness, tremors, seizures, facial asymmetry and numbness.   Hematological:  Negative for adenopathy. Bruises/bleeds easily.   Psychiatric/Behavioral: Negative.       Objective     Physical Exam  Vitals and nursing note reviewed. Exam conducted with a chaperone present.    Constitutional:       Appearance: Normal appearance.   Eyes:      Extraocular Movements: Extraocular movements intact.      Conjunctiva/sclera: Conjunctivae normal.      Pupils: Pupils are equal, round, and reactive to light.   Cardiovascular:      Rate and Rhythm: Normal rate and regular rhythm.   Pulmonary:      Effort: Pulmonary effort is normal.      Breath sounds: Normal breath sounds.   Chest:      Chest wall: No swelling or tenderness.   Breasts:     Breasts are symmetrical.   Abdominal:      General: There is no distension.      Palpations: Abdomen is soft. There is no mass.      Tenderness: There is no guarding.      Hernia: No hernia is present.   Musculoskeletal:         General: No deformity or signs of injury. Normal range of motion.      Cervical back: Normal range of motion and neck supple.      Right lower leg: No edema.      Left lower leg: No edema.   Skin:     Capillary Refill: Capillary refill takes 2 to 3 seconds.      Findings: No bruising, erythema, lesion or rash.      Comments: Well healed surgical incisions except for 2 superficial skin breakdown measuring 1x.03 cm and [id number] at the left brachioplasty incision with minimal discharge.    Neurological:      General: No focal deficit present.      Mental Status: She is oriented to person, place, and time.   Psychiatric:         Mood and Affect: Mood normal.         Behavior: Behavior normal.         Thought Content: Thought content normal.         Judgment: Judgment normal.       Procedure:    Left arm prepped with Chloraprep. 6cc of 2% Lido with epi used for local anesthetic. 2 areas debrided and re approximated with 3-0 Monocryl in interrupted sutures. Adjacent wound left open to continue to drain. Base of wound pink with healthy tissue noted prior to re approximation.      Assessment/Plan     I had the pleasure of seeing Ms. Rubio today. She is here for post-operative evaluation and surgical wound check.     Left Arm Brachial wound repair  completed while patient in clinic   - Sutures absorbable, no need for removal, will dissolve over time   - Patient OK to cleanse site with warm soapy water but avoid scrubbing or soaking     We discussed wound care. Please use aquacel ag on 1cm x.5cm area of superficial breakdown on left arm   Continue wearing compression sleeves and compression garments for the arms and the abdomen for 6-8 weeks  Can start using aquaphor and silicone sheets on all wounds except the area on right arm where we are applying wound care.    We would like to reach out to Dr. Verónica Ordonez to possibly add Lipo suction and Kenalog Scar injection to the Left Axilla if needed at the same time of her Hysterectomy. Timing is not yet set.    Post-surgical lymphedema massage at the abdomen and both arms.   Weight lifting restrictions, no more than 5 pounds per side, for a total of 8 weeks.     F/U: After 2 weeks.

## 2024-06-04 ENCOUNTER — HOSPITAL ENCOUNTER (OUTPATIENT)
Dept: RADIOLOGY | Facility: CLINIC | Age: 54
Discharge: HOME | End: 2024-06-04
Payer: COMMERCIAL

## 2024-06-04 DIAGNOSIS — Z87.42 HISTORY OF ENDOMETRIOSIS: ICD-10-CM

## 2024-06-04 DIAGNOSIS — N93.9 ABNORMAL UTERINE BLEEDING (AUB): ICD-10-CM

## 2024-06-04 PROCEDURE — 76830 TRANSVAGINAL US NON-OB: CPT

## 2024-06-06 ENCOUNTER — TELEPHONE (OUTPATIENT)
Dept: GYNECOLOGIC ONCOLOGY | Facility: HOSPITAL | Age: 54
End: 2024-06-06
Payer: COMMERCIAL

## 2024-06-06 NOTE — TELEPHONE ENCOUNTER
Patient sent a [employer] message about what her physician thought of her ultrasound results. I sent her results to the physician and informed the patient that the results were sent. I told the patient that I will notify her when I hear from the doctor.

## 2024-06-09 DIAGNOSIS — N65.1 BREAST ASYMMETRY FOLLOWING RECONSTRUCTIVE SURGERY: ICD-10-CM

## 2024-06-09 DIAGNOSIS — R20.2 NUMBNESS AND TINGLING OF LEFT THUMB: ICD-10-CM

## 2024-06-09 DIAGNOSIS — Z98.890 STATUS POST BREAST RECONSTRUCTION: ICD-10-CM

## 2024-06-09 DIAGNOSIS — R20.0 NUMBNESS AND TINGLING OF LEFT THUMB: ICD-10-CM

## 2024-06-10 ENCOUNTER — APPOINTMENT (OUTPATIENT)
Dept: PLASTIC SURGERY | Facility: CLINIC | Age: 54
End: 2024-06-10
Payer: COMMERCIAL

## 2024-06-28 ENCOUNTER — APPOINTMENT (OUTPATIENT)
Dept: PHYSICAL MEDICINE AND REHAB | Facility: CLINIC | Age: 54
End: 2024-06-28
Payer: COMMERCIAL

## 2024-07-01 ENCOUNTER — TELEPHONE (OUTPATIENT)
Dept: GASTROENTEROLOGY | Facility: HOSPITAL | Age: 54
End: 2024-07-01
Payer: COMMERCIAL

## 2024-07-01 NOTE — TELEPHONE ENCOUNTER
Patient is due for her annual pancreatic surveillance screening. Patient requesting to call back a different time. Call back phone number was provided to the patient.

## 2024-07-11 RX ORDER — IBUPROFEN 600 MG/1
600 TABLET ORAL EVERY 6 HOURS PRN
Qty: 120 TABLET | Refills: 1 | OUTPATIENT
Start: 2024-07-11 | End: 2024-08-10

## 2024-07-15 DIAGNOSIS — N93.9 ABNORMAL UTERINE BLEEDING (AUB): ICD-10-CM

## 2024-07-22 ENCOUNTER — APPOINTMENT (OUTPATIENT)
Dept: NEUROLOGY | Facility: CLINIC | Age: 54
End: 2024-07-22
Payer: COMMERCIAL

## 2024-08-06 ENCOUNTER — HOSPITAL ENCOUNTER (OUTPATIENT)
Dept: RADIOLOGY | Facility: CLINIC | Age: 54
Discharge: HOME | End: 2024-08-06
Payer: COMMERCIAL

## 2024-08-06 DIAGNOSIS — N93.9 ABNORMAL UTERINE BLEEDING (AUB): ICD-10-CM

## 2024-08-06 PROCEDURE — 76856 US EXAM PELVIC COMPLETE: CPT

## 2024-08-06 PROCEDURE — 76830 TRANSVAGINAL US NON-OB: CPT | Performed by: RADIOLOGY

## 2024-08-06 PROCEDURE — 76857 US EXAM PELVIC LIMITED: CPT | Performed by: RADIOLOGY

## 2024-08-13 DIAGNOSIS — N93.9 ABNORMAL UTERINE BLEEDING (AUB): ICD-10-CM

## 2024-08-13 DIAGNOSIS — Z87.42 HISTORY OF ENDOMETRIOSIS: Primary | ICD-10-CM

## 2024-08-22 ENCOUNTER — APPOINTMENT (OUTPATIENT)
Dept: GYNECOLOGIC ONCOLOGY | Facility: CLINIC | Age: 54
End: 2024-08-22
Payer: COMMERCIAL

## 2024-09-05 ENCOUNTER — APPOINTMENT (OUTPATIENT)
Dept: GYNECOLOGIC ONCOLOGY | Facility: CLINIC | Age: 54
End: 2024-09-05
Payer: COMMERCIAL

## 2024-09-12 ENCOUNTER — APPOINTMENT (OUTPATIENT)
Dept: SURGICAL ONCOLOGY | Facility: CLINIC | Age: 54
End: 2024-09-12
Payer: COMMERCIAL

## 2024-10-03 ENCOUNTER — HOSPITAL ENCOUNTER (OUTPATIENT)
Dept: RADIOLOGY | Facility: CLINIC | Age: 54
Discharge: HOME | End: 2024-10-03
Payer: COMMERCIAL

## 2024-10-03 DIAGNOSIS — N93.9 ABNORMAL UTERINE BLEEDING (AUB): ICD-10-CM

## 2024-10-03 DIAGNOSIS — Z87.42 HISTORY OF ENDOMETRIOSIS: ICD-10-CM

## 2024-10-03 PROCEDURE — 76856 US EXAM PELVIC COMPLETE: CPT

## 2024-10-04 ENCOUNTER — TELEPHONE (OUTPATIENT)
Dept: GYNECOLOGIC ONCOLOGY | Facility: HOSPITAL | Age: 54
End: 2024-10-04
Payer: COMMERCIAL

## 2024-10-04 DIAGNOSIS — Z87.42 HISTORY OF ENDOMETRIOSIS: Primary | ICD-10-CM

## 2024-10-04 DIAGNOSIS — R19.00 PELVIC MASS: ICD-10-CM

## 2024-10-04 RX ORDER — CELECOXIB 400 MG/1
400 CAPSULE ORAL ONCE
OUTPATIENT
Start: 2024-10-04 | End: 2024-10-04

## 2024-10-04 RX ORDER — SODIUM CHLORIDE, SODIUM LACTATE, POTASSIUM CHLORIDE, CALCIUM CHLORIDE 600; 310; 30; 20 MG/100ML; MG/100ML; MG/100ML; MG/100ML
20 INJECTION, SOLUTION INTRAVENOUS CONTINUOUS
OUTPATIENT
Start: 2024-10-04

## 2024-10-04 RX ORDER — ACETAMINOPHEN 325 MG/1
975 TABLET ORAL ONCE
OUTPATIENT
Start: 2024-10-04 | End: 2024-10-04

## 2024-10-04 RX ORDER — GABAPENTIN 600 MG/1
600 TABLET ORAL ONCE
OUTPATIENT
Start: 2024-10-04 | End: 2024-10-04

## 2024-10-04 NOTE — TELEPHONE ENCOUNTER
Patient sent [employer] message requesting update regarding MRI scheduling in preparation for surgery.  Staff message sent to Dr. Gonsales with patient question.

## 2024-10-17 ENCOUNTER — APPOINTMENT (OUTPATIENT)
Dept: GYNECOLOGIC ONCOLOGY | Facility: CLINIC | Age: 54
End: 2024-10-17
Payer: COMMERCIAL

## 2024-10-17 ENCOUNTER — OFFICE VISIT (OUTPATIENT)
Dept: GYNECOLOGIC ONCOLOGY | Facility: CLINIC | Age: 54
End: 2024-10-17
Payer: COMMERCIAL

## 2024-10-17 VITALS
HEART RATE: 74 BPM | DIASTOLIC BLOOD PRESSURE: 83 MMHG | TEMPERATURE: 96.6 F | RESPIRATION RATE: 17 BRPM | OXYGEN SATURATION: 95 % | SYSTOLIC BLOOD PRESSURE: 121 MMHG

## 2024-10-17 DIAGNOSIS — Z87.42 HISTORY OF ENDOMETRIOSIS: ICD-10-CM

## 2024-10-17 DIAGNOSIS — Z98.890 S/P HERNIA REPAIR: ICD-10-CM

## 2024-10-17 DIAGNOSIS — Z15.89 PALB2 GENE MUTATION POSITIVE: ICD-10-CM

## 2024-10-17 DIAGNOSIS — Z87.19 S/P HERNIA REPAIR: ICD-10-CM

## 2024-10-17 DIAGNOSIS — N93.9 ABNORMAL UTERINE BLEEDING (AUB): Primary | ICD-10-CM

## 2024-10-17 PROCEDURE — 99215 OFFICE O/P EST HI 40 MIN: CPT | Performed by: STUDENT IN AN ORGANIZED HEALTH CARE EDUCATION/TRAINING PROGRAM

## 2024-10-17 RX ORDER — FEZOLINETANT 45 MG/1
45 TABLET, FILM COATED ORAL DAILY
COMMUNITY

## 2024-10-17 RX ORDER — PSYLLIUM HUSK 0.4 G
5 CAPSULE ORAL DAILY
COMMUNITY

## 2024-10-17 ASSESSMENT — PAIN SCALES - GENERAL: PAINLEVEL_OUTOF10: 0-NO PAIN

## 2024-10-17 NOTE — PROGRESS NOTES
Patient ID: Cheyanne Rubio is a 54 y.o. female.  Referring Physician: Yvette Gonsales MD  [address]  [place] [address]  Primary Care Provider: No Assigned PCP Generic ProviderMD Ramachandran    55yo  presenting in GYN Oncology follow up - PALB2 mutation with history of bilateral mastectomy, TRAM reconstruction with abdominal wall reconstruction with mesh (11/10/23) requiring revision. Tentative plan for concurrent liposuction and axillary scar revision as concurrent procedure. History of endometriosis; symptomatic climacteric for which she was started on fezolinetant last visit. Patient deferred surgery at initial consultation due to prior protracted course and desired recovery now ready to proceed with surgical resection. She reports intermittent midabdominal pain - occasional sharp in nature, lasting 5 min followed by aching sensation. No specific positions that alleviate or aggravate symptoms. Occasionally worse with changes in bowel frequency. Denies urinary urgency/frequency. No abnormal vaginal discharge/bleeding. Bothersome hot flashes unchanged, has had difficulty obtaining [place]. Denies pain with intercourse. No other changes.     A comprehensive review of systems was performed and otherwise negative.        Objective    BSA: There is no height or weight on file to calculate BSA. Declined height/weight  /83 (BP Location: Right arm, Patient Position: Sitting, BP Cuff Size: Adult long)   Pulse 74   Temp 35.9 °C (96.6 °F) (Temporal)   Resp 17   SpO2 95%      Physical Exam  Vitals and nursing note reviewed. Exam conducted with a chaperone present.   Constitutional:       Appearance: Normal appearance. She is normal weight.   HENT:      Head: Normocephalic and atraumatic.      Mouth/Throat:      Mouth: Mucous membranes are moist.      Pharynx: No oropharyngeal exudate or posterior oropharyngeal erythema.   Eyes:      Extraocular Movements: Extraocular movements intact.       Conjunctiva/sclera: Conjunctivae normal.      Pupils: Pupils are equal, round, and reactive to light.   Cardiovascular:      Rate and Rhythm: Normal rate.   Pulmonary:      Effort: Pulmonary effort is normal.      Breath sounds: No wheezing, rhonchi or rales.   Abdominal:      General: A surgical scar is present.      Palpations: Abdomen is soft. There is no mass.      Tenderness: There is no guarding or rebound.      Hernia: No hernia is present. There is no hernia in the left inguinal area or right inguinal area.      Comments: Surgical incision clean/dry/intact without redness, drainage or erythema   +TTP in periumbilcal, lower abdomen   Genitourinary:     General: Normal vulva.      Pubic Area: No rash.       Labia:         Right: No lesion.         Left: No lesion.       Vagina: Normal.      Uterus: Normal.       Adnexa:         Right: Fullness present.         Left: Mass present.       Comments: Grossly normal cervix without visible lesions   Fullness in R adnexa without discrete mass; nodularity on left uterosacral ligament +TTP - no rebound/guarding  Musculoskeletal:         General: Normal range of motion.   Lymphadenopathy:      Lower Body: No right inguinal adenopathy. No left inguinal adenopathy.   Skin:     General: Skin is warm.      Findings: No erythema or rash.   Neurological:      General: No focal deficit present.      Mental Status: She is alert and oriented to person, place, and time.   Psychiatric:         Mood and Affect: Mood normal.         Behavior: Behavior normal.       Performance Status:  Symptomatic; fully ambulatory    Recent Imaging:  TVUS 10/3/24  IMPRESSION:  Stable complex well-defined lesion in the right ovary with echogenic  and hypoechoic components. Question stable ovarian dermoid versus  endometrioma. Stable solid and cystic neoplasm other than dermoid is  a differential consideration. Clinical and laboratory correlation are  needed.  Small cyst or prominent follicle in the  right ovary on the previous  exam has resolved.  Remainder of the exam was negative.    Assessment/Plan   53yo with pathogenic PALB2 mutation, history of endometriosis for RRBSO/hysterectomy. Prior abdominal wall reconstruction after TRAM +mesh placement. Symptomatic climacteric.     Diagnoses and all orders for this visit:  PALB2 gene mutation positive  - Pathogenic PALB2 mutation with history of prior abdominal wall reconstruction with midline mesh placement ([id number] per operative note) s/p TRAM. Patient at increased risk of intraabdominal adhesions in setting of endometriosis, abdominal wall surgeries and increased risk of conversion to laparotomy. Due to extent of prior surgical procedures, discussed concurrent robot-assisted enterolysis and evaluation with   - We discussed that there is a 3-5% lifetime risk of developing ovarian cancer with a PALB2 mutation. Risk-reducing bilateral salpingo-oophorectomy may be considered as an option for ovarian cancer prevention and patient would like to pursue all options to reduce her individualized risk. She was counseled that there still remains a risk, albeit low, of the development of primary peritoneal cancer after surgical risk reduction and ongoing symptom monitoring is recommended post-BSO.   - I have specifically discussed in detail with the patient the risks of gynecologic surgery, including bleeding, infection, scarring, thromboembolic complications, incisional complications and injury to adjacent organs and structures, including the bladder, ureters, intestinal tract, major blood vessels, and nerves, need for additional procedures and risks inherent to anesthesia including possible death. Postoperative recovery was reviewed, including the risk of venous thrombosis, bowel adhesions, ileus and incisional hernia formation.   - The patient was given time to ask pertinent questions and would like to proceed with procedures as indicated. Other alternatives including  nonsurgical options reviewed with patients. All questions were answered to patient's satisfaction.  - Reviewed postoperative expectations and instructions, including pelvic rest for 6 weeks, no heavy lifting for 4 weeks.   - Surgical consents reviewed and signed in office.  [ ] Tentative OR 12/11  - Overnight observation favored    Abnormal uterine bleeding (AUB)  History of endometriosis  - Concurrent hysterectomy/resection of endometriosis at time of surgical procedure  - Preoperative MRI for evaluation/assessment  S/p Hernia repair   - Increased risk of intestinal adhesions at time of procedure due to prior ventral hernia repair with Strattice mesh placement. Patient informed of inherently increased risk re: bowel resection at time of procedure and recommendation for concurrent assessment/evaluation and combined procedure with General Surgery   - Referral to Dr Opal conley; goal for robot-assisted approach  - Consultation pending '      Yvette Gonsales MD

## 2024-10-18 ENCOUNTER — HOSPITAL ENCOUNTER (OUTPATIENT)
Dept: RADIOLOGY | Facility: CLINIC | Age: 54
Discharge: HOME | End: 2024-10-18
Payer: COMMERCIAL

## 2024-10-18 VITALS — BODY MASS INDEX: 29.76 KG/M2 | HEIGHT: 67 IN | WEIGHT: 189.6 LBS

## 2024-10-18 DIAGNOSIS — R19.00 PELVIC MASS: ICD-10-CM

## 2024-10-18 DIAGNOSIS — Z87.42 HISTORY OF ENDOMETRIOSIS: ICD-10-CM

## 2024-10-18 PROCEDURE — A9575 INJ GADOTERATE MEGLUMI 0.1ML: HCPCS | Performed by: STUDENT IN AN ORGANIZED HEALTH CARE EDUCATION/TRAINING PROGRAM

## 2024-10-18 PROCEDURE — 2550000001 HC RX 255 CONTRASTS: Performed by: STUDENT IN AN ORGANIZED HEALTH CARE EDUCATION/TRAINING PROGRAM

## 2024-10-18 PROCEDURE — 72197 MRI PELVIS W/O & W/DYE: CPT

## 2024-10-18 RX ORDER — GADOTERATE MEGLUMINE 376.9 MG/ML
17 INJECTION INTRAVENOUS
Status: COMPLETED | OUTPATIENT
Start: 2024-10-18 | End: 2024-10-18

## 2024-10-22 ASSESSMENT — ENCOUNTER SYMPTOMS
SHORTNESS OF BREATH: 0
ABDOMINAL PAIN: 0
FEVER: 0
ACTIVITY CHANGE: 0
VOMITING: 0
APPETITE CHANGE: 0
FATIGUE: 0
CONSTIPATION: 0
CHILLS: 0
PALPITATIONS: 0
COUGH: 0
ANAL BLEEDING: 0
LIGHT-HEADEDNESS: 0
UNEXPECTED WEIGHT CHANGE: 0
RECTAL PAIN: 0
DIAPHORESIS: 0
DYSURIA: 0
NAUSEA: 0
HEMATURIA: 0
DIARRHEA: 0
WEAKNESS: 0
DIFFICULTY URINATING: 0
BLOOD IN STOOL: 0
DIZZINESS: 0
CHEST TIGHTNESS: 0

## 2024-10-22 NOTE — PROGRESS NOTES
Cheyanne Rubio  [id number]   10/22/24  7:57 AM    HPI/Subjective:  Cheyanne Rubio is a 54 y.o. female with history of Pathogenic PALB2 mutation, endometriosis, previous abdominal wall reconstruction with TRAM and mesh placement who is referred to clinic by Yvette Gonsales MD for surgical planning.    On 11/10/2023, she had bilateral prophylactic mastectomy with immediate bilateral TRAM flap reconstruction, and reconstruction of the abdominal wall with mesh.  The abdominal wall defect was 8 x 8 cm on each side and was reconstructed as a single defect with 15 x 20cm Strattice mesh.   She then underwent revision with bilateral bracheoplasty, bilateral breast revision with lateral chest liposuction and suction-assisted lipectiomy and skin excision of the lower abdomen on 3/27/2024.  She consulted with Gynecologic Oncologist Dr. Gonsales in May 2024, and discussed risk-reducing bilateral salpingo-oophorectomy for ovarian cancer prevention.  She is scheduled for Robot-assisted bilateral salpingo-oophorectomy, possible hysterectomy, possible staging for endometriosis and pelvic mass with Dr. Gonsales on on 12/11/2024.  General Surgery is consulted for combined procedure due to risk of intestinal adhesions related to prior abdominal repair with mesh.    Symptomatically, this patient experiences some lower abdominal bulging, which is gradually worsening - very active and this bothers her.    Past surgical history is otherwise notable for:    Dermatolipectomy Abdomen, Excision Lesion Skin Torso, Repair Laceration Torso, Dermatolipectomy Upper Extremity (3/27/2024)    Bilateral Skin Sparing Mastectomy, Creation Myocutaneous Flap Transverse Rectus Abdominis for Immediate Reconstruction Bilateral Breast, Reconstruction Abdominal Wall with Mesh  (11/10/2023)    Hysteroscopy, Dilation and Curettage of Uterus (10/20/2014)  Diagnostic Laparoscopy, biopsy, Lysis of Adhesions (10/20/2014)    Past medical history is significant for:  Endometriosis,  Thyroid nodule, Vitamin D deficiency, uterine polyp and monoalleclic mutation of the PALB 2 gene    Hepatic insufficiency or liver failure - No  Ascites - No  Hypertension - No  Diabetes mellitus - No   Dialysis (acute or chronic renal failure requiring dialysis within 2 weeks prior to surgery) - None  COPD - No  Dyspnea - No  Antiplatelet medications excluding aspirin - No  Anticoagulation medications - No  Aspirin - No  Immunosuppression - No  Nicotine use in relation to OR date - No  History of AAA - No  Collagen vascular disorder - No  Congestive heart failure - No  Active pregnancy - No    Review of Systems   Constitutional:  Negative for activity change, appetite change, chills, diaphoresis, fatigue, fever and unexpected weight change.   Respiratory:  Negative for cough, chest tightness and shortness of breath.    Cardiovascular:  Negative for palpitations and leg swelling.   Gastrointestinal:  Negative for abdominal pain, anal bleeding, blood in stool, constipation, diarrhea, nausea, rectal pain and vomiting.   Genitourinary:  Negative for difficulty urinating, dysuria and hematuria.   Neurological:  Negative for dizziness, weakness and light-headedness.          Current Outpatient Medications   Medication Instructions    B complex-vitamin C-folic acid (Nephro-Monie Rx) 1- mg-mg-mcg tablet 1 tablet, oral, Daily with breakfast    brimonidine 0.025 % drops 1 drop, 2 times daily PRN    calcium carbonate (CALTRATE 600 ORAL) 1 tablet, Daily    fexofenadine (ALLEGRA) 180 mg, Daily    methocarbamol (ROBAXIN) 500 mg, oral, 4 times daily PRN    psyllium (Metamucil) 0.4 gram capsule 5 capsules, oral, Daily    UNABLE TO FIND 1 tablet, Daily    Veozah 45 mg, oral, Daily       Allergies   Allergen Reactions    Oxycodone Confusion    Codeine-Guaifenesin Other    Nitro Transdermal Other     Pt stated she has a lot of nausea when cream applied    Tramadol Insomnia    Codeine Nausea/vomiting, Rash and Other        Objective:  There were no vitals filed for this visit.  There is no height or weight on file to calculate BMI.  Physical Exam  Vitals reviewed. Exam conducted with a chaperone present.   Constitutional:       General: She is not in acute distress.     Appearance: Normal appearance. She is not ill-appearing.   HENT:      Head: Normocephalic.      Mouth/Throat:      Mouth: Mucous membranes are moist.   Eyes:      Extraocular Movements: Extraocular movements intact.      Pupils: Pupils are equal, round, and reactive to light.   Cardiovascular:      Rate and Rhythm: Normal rate and regular rhythm.      Pulses: Normal pulses.      Heart sounds: Normal heart sounds. No murmur heard.  Pulmonary:      Effort: Pulmonary effort is normal. No respiratory distress.      Breath sounds: Normal breath sounds. No wheezing, rhonchi or rales.   Chest:      Chest wall: No tenderness.   Abdominal:      General: There is no distension.      Palpations: There is no mass.      Tenderness: There is no abdominal tenderness. There is no guarding or rebound.      Hernia: No hernia is present.   Musculoskeletal:         General: No swelling or deformity.      Cervical back: Neck supple. No rigidity.      Right lower leg: No edema.      Left lower leg: No edema.   Skin:     General: Skin is warm.      Coloration: Skin is not jaundiced or pale.   Neurological:      General: No focal deficit present.      Mental Status: She is alert and oriented to person, place, and time. Mental status is at baseline.   Psychiatric:         Mood and Affect: Mood normal.         Behavior: Behavior normal.         Thought Content: Thought content normal.         Judgment: Judgment normal.       No Imaging    Assessment/Plan:  Cheyanne Rubio is a 54 y.o. female with history of Pathogenic PALB2 mutation, endometriosis, previous abdominal wall reconstruction with TRAM and mesh placement  who presents for surgical planning. Plan is for minimally invasive  risk-reducing BSO, hyst, and endometriosis resection on 12/11, and I will assist with abdominal access, adhesiolysis, and closure.    Of note, Ms. Rubio does report feeling some lower abdominal bulging/protrusion that is bothersome to her, and I suspect she has some degree of post-TRAM bulging. I discussed with her that this is something that could be addressed with heavyweight mesh but from the standpoint of limiting chance for contamination I would plan to do that as a separate operation down the road    We will plan to obtain imaging consisting of a CT scan of the abdomen and pelvis for operative planning for this surgery and as a reference for subsequent abdominal wall operation .    I will see the patient back in the office in the postoperative period or sooner if needed.    Portions of medical record reviewed for pertinent issues including active problem list, medication list, allergies, social history, health maintenance, notes from previous encounters, lab results, and imaging.     Mike Joseph MD  Assistant Professor of Surgery  Division of General Surgery  Department of Surgery

## 2024-10-24 ENCOUNTER — TELEMEDICINE (OUTPATIENT)
Dept: GYNECOLOGIC ONCOLOGY | Facility: CLINIC | Age: 54
End: 2024-10-24
Payer: COMMERCIAL

## 2024-10-24 ENCOUNTER — APPOINTMENT (OUTPATIENT)
Dept: GYNECOLOGIC ONCOLOGY | Facility: CLINIC | Age: 54
End: 2024-10-24
Payer: COMMERCIAL

## 2024-10-24 DIAGNOSIS — N93.9 ABNORMAL UTERINE BLEEDING (AUB): ICD-10-CM

## 2024-10-24 DIAGNOSIS — Z87.42 HISTORY OF ENDOMETRIOSIS: ICD-10-CM

## 2024-10-24 DIAGNOSIS — Z15.89 PALB2 GENE MUTATION POSITIVE: Primary | ICD-10-CM

## 2024-10-24 DIAGNOSIS — Z98.890 S/P HERNIA REPAIR: ICD-10-CM

## 2024-10-24 DIAGNOSIS — Z87.19 S/P HERNIA REPAIR: ICD-10-CM

## 2024-10-24 PROCEDURE — 1036F TOBACCO NON-USER: CPT | Performed by: STUDENT IN AN ORGANIZED HEALTH CARE EDUCATION/TRAINING PROGRAM

## 2024-10-24 PROCEDURE — 99441 PR PHYS/QHP TELEPHONE EVALUATION 5-10 MIN: CPT | Performed by: STUDENT IN AN ORGANIZED HEALTH CARE EDUCATION/TRAINING PROGRAM

## 2024-10-24 ASSESSMENT — COLUMBIA-SUICIDE SEVERITY RATING SCALE - C-SSRS
2. HAVE YOU ACTUALLY HAD ANY THOUGHTS OF KILLING YOURSELF?: NO
1. IN THE PAST MONTH, HAVE YOU WISHED YOU WERE DEAD OR WISHED YOU COULD GO TO SLEEP AND NOT WAKE UP?: NO
6. HAVE YOU EVER DONE ANYTHING, STARTED TO DO ANYTHING, OR PREPARED TO DO ANYTHING TO END YOUR LIFE?: NO

## 2024-10-24 ASSESSMENT — ENCOUNTER SYMPTOMS
OCCASIONAL FEELINGS OF UNSTEADINESS: 0
DEPRESSION: 0
LOSS OF SENSATION IN FEET: 0

## 2024-10-24 ASSESSMENT — PAIN SCALES - GENERAL: PAINLEVEL_OUTOF10: 5

## 2024-10-24 ASSESSMENT — PATIENT HEALTH QUESTIONNAIRE - PHQ9
1. LITTLE INTEREST OR PLEASURE IN DOING THINGS: NOT AT ALL
SUM OF ALL RESPONSES TO PHQ9 QUESTIONS 1 AND 2: 0
2. FEELING DOWN, DEPRESSED OR HOPELESS: NOT AT ALL

## 2024-10-24 NOTE — PROGRESS NOTES
Consent:  Verbal consent was requested and obtained from patient on this date for a telehealth visit.    Patient ID: Cheyanne Rubio is a 54 y.o. female.  Referring Physician: No referring provider defined for this encounter.  Primary Care Provider: No Assigned PCP Generic Provider, MD    Subjective    Patient wishes to discuss imaging results; continues to have abdominal pain and tugging sensation. Ongoing pain with intercourse that is affecting her quality of life. No other interim changes since last assessment.         Objective    BSA: There is no height or weight on file to calculate BSA.  There were no vitals taken for this visit.     Physical Exam  General:   alert and oriented, in no acute distress   Cardiovascular: No apparent distress    Lungs: No increased work of breathing   Neurologic:  Grossly intact, no deficits     Recent Imaging:  MR pelvis w and wo IV contrast  Result Date: 10/21/2024  Impression:   1.  Complex cystic lesion in the right ovary which may represent endometrioma or dermoid, unable to differentiate the lesions without a T1 weighted sequence (the patient may be called back at no additional charge to perform a T1 sequence if clinically warranted). The apparent enhancement on the subtraction imaging is favored to be due to misregistration artifact rather than true internal enhancement. Recommend assessment intraoperatively versus close follow-up with MRI in 6 months.   2. Uterine adenomyosis.   3. Unremarkable left ovary.       MACRO: None   Signed by: Paulina Raygoza 10/21/2024 10:48 AM Dictation workstation:   [id number]    Performance Status:  Symptomatic; in bed <50% of the day    Assessment/Plan    Oncology History    No history exists.     Cancer Staging   No matching staging information was found for the patient.     Diagnoses and all orders for this visit:  PALB2 gene mutation positive  - Previously counseled re: role of surgical risk reduction for slightly elevated ovarian cancer  risk in setting of PALB2 mutation as well as endometriosis  History of endometriosis  Abnormal uterine bleeding (AUB)  - Previously counseled re: surgical risks, favor endometriosis on MRI imaging with findings supportive of examination findings re: uterosacral ligament nodularity  - Discussed in event of unresectable adhesive disease, possibility of aborting surgical procedure but in event we are successfully able to accomplish MIS adhesiolysis and development of pelvic retroperitoneal spaces - to proceed with surgical resection with acceptable risk of increased complications r/t bowel injury. Follow up consultation with Dr Joseph.   S/P hernia repair  - Pending consultation with Dr Joseph 10/25    Approx 5min spent in discussion with patient.      Yvette Gonsales MD

## 2024-10-25 ENCOUNTER — APPOINTMENT (OUTPATIENT)
Dept: SURGERY | Facility: CLINIC | Age: 54
End: 2024-10-25
Payer: COMMERCIAL

## 2024-10-25 VITALS — HEART RATE: 64 BPM | SYSTOLIC BLOOD PRESSURE: 122 MMHG | DIASTOLIC BLOOD PRESSURE: 83 MMHG

## 2024-10-25 DIAGNOSIS — Z98.890 S/P HERNIA REPAIR: ICD-10-CM

## 2024-10-25 DIAGNOSIS — Z98.890 S/P TRAM (TRANSVERSE RECTUS ABDOMINIS MUSCLE) FLAP BREAST RECONSTRUCTION: Primary | ICD-10-CM

## 2024-10-25 DIAGNOSIS — Z87.19 S/P HERNIA REPAIR: ICD-10-CM

## 2024-10-25 PROCEDURE — 99214 OFFICE O/P EST MOD 30 MIN: CPT | Performed by: SURGERY

## 2024-10-25 RX ORDER — ELECTROLYTES/DEXTROSE
5 SOLUTION, ORAL ORAL DAILY
COMMUNITY

## 2024-10-25 ASSESSMENT — PAIN SCALES - GENERAL: PAINLEVEL_OUTOF10: 2

## 2024-10-29 NOTE — PROGRESS NOTES
Subjective   Patient ID: Cheyanne Rubio is a 54 y.o. female presenting for virtual follow up    HPI Cheyanne Rubio is a 53 y.o. female with a past medical history of pathogenic PALB2 gene mutation s/p bilateral mastectomy with immediate TRAM reconstruction and reconstruction of abdominal wall with mesh on 11/10/23. She is now s/p bilateral brachioplasty, bilateral breast revision with lateral chest liposuction and suction assisted lipectomy and skin excision of the lower abdomen on 3/27/2024. Her Brachioplasty post operative course was complicated by left arm cellulitis since resolved with Keflex. Patient required bedside debridement and closure of left arm surgical incision with absorbable sutures 6/3/24.     Discussed combining hysterectomy with liposuction on 12/11/24. Preferred areas of liposuction include lateral chest. Pt notes small area of asymmetry between the breasts, is considering fat grafting. Pt advised to return to office for evaluation of breasts to determine candidacy for fat grafting. Pt getting CT A/P scan 11/8 to assess for abdominal wall weakness. Pending results, can consider additional assisting to reinforce abdominal wall with upcoming hysterectomy.    Review of Systems   Constitutional:  Negative for activity change, appetite change, chills, fatigue and fever.   HENT:  Negative for congestion, facial swelling, rhinorrhea, sinus pain, sore throat and trouble swallowing.    Eyes:  Negative for pain, discharge and redness.   Respiratory:  Negative for apnea, cough, choking, chest tightness, shortness of breath and stridor.    Cardiovascular:  Negative for chest pain, palpitations and leg swelling.   Gastrointestinal:  Negative for abdominal distention, abdominal pain, constipation, diarrhea, nausea and vomiting.   Endocrine: Negative for cold intolerance, heat intolerance and polyuria.   Genitourinary:  Negative for difficulty urinating, dysuria, flank pain, frequency and urgency.   Musculoskeletal:   Negative for gait problem, joint swelling, myalgias, neck pain and neck stiffness.   Skin:  Negative for color change, pallor and rash.   Neurological:  Negative for dizziness, tremors, seizures, facial asymmetry and numbness.   Hematological:  Negative for adenopathy. Bruises/bleeds easily.   Psychiatric/Behavioral: Negative.       Objective     Physical Exam  Deferred due to virtual visit     Assessment/Plan     I had the pleasure of seeing Ms. Rubio today.  She is scheduled to have hysterectomy 12/11/24 with Dr. Gonsales and Dr. Joseph. She is interested in Liposuction of excess fatty tissue at the lateral chest, bilaterally, and other areas of her trunk, with possible fat grafting to the breasts, in particular to reduce the cleavage and to camouflage some contour irregularities. Patient also expressed concerned over lower abdominal discomfort and possible bulge.  She has seen Dr. Joseph who ordered CT abdomen and pelvis to assess possible lower abdominal weakness/bulge.     Plan:  Schedule in person visit for evaluation of candidacy for fat grafting.  Case request for trunk liposuction and breast fat grafting.   Will review upcoming CT A/P results with Dr. Joseph to determine candidacy for additional abdominal reinforcement with upcoming procedure or in a delayed fashion if contamination/infection risk is deemed high.

## 2024-11-06 ENCOUNTER — APPOINTMENT (OUTPATIENT)
Dept: PLASTIC SURGERY | Facility: CLINIC | Age: 54
End: 2024-11-06
Payer: COMMERCIAL

## 2024-11-06 DIAGNOSIS — E66.9 EXCESSIVE FAT: Primary | ICD-10-CM

## 2024-11-06 DIAGNOSIS — N65.1 DEFORMITY AND DISPROPORTION OF RECONSTRUCTED BREAST: ICD-10-CM

## 2024-11-06 DIAGNOSIS — N65.0 DEFORMITY AND DISPROPORTION OF RECONSTRUCTED BREAST: ICD-10-CM

## 2024-11-06 DIAGNOSIS — N65.0 CONTOUR IRREGULARITY IN RECONSTRUCTED BREAST: ICD-10-CM

## 2024-11-06 DIAGNOSIS — R19.00 ABDOMINAL WALL BULGE: ICD-10-CM

## 2024-11-06 DIAGNOSIS — G89.18 POST-OPERATIVE PAIN: ICD-10-CM

## 2024-11-06 PROCEDURE — 99212 OFFICE O/P EST SF 10 MIN: CPT

## 2024-11-08 ENCOUNTER — HOSPITAL ENCOUNTER (OUTPATIENT)
Dept: RADIOLOGY | Facility: CLINIC | Age: 54
Discharge: HOME | End: 2024-11-08
Payer: COMMERCIAL

## 2024-11-08 DIAGNOSIS — Z98.890 S/P HERNIA REPAIR: ICD-10-CM

## 2024-11-08 DIAGNOSIS — Z87.19 S/P HERNIA REPAIR: ICD-10-CM

## 2024-11-08 PROCEDURE — 74176 CT ABD & PELVIS W/O CONTRAST: CPT

## 2024-11-08 NOTE — PROGRESS NOTES
"Subjective   Patient ID: Cheyanne Rubio is a 54 y.o. female presenting as a pre-operative visit for in person assessment of ALEKSANDR flap.    HPI Cheyanne Rubio is a 53 y.o. female with a past medical history of pathogenic PALB2 gene mutation s/p bilateral mastectomy with immediate TRAM reconstruction and reconstruction of abdominal wall with mesh on 11/10/23. She is now s/p bilateral brachioplasty, bilateral breast revision with lateral chest liposuction and suction assisted lipectomy and skin excision of the lower abdomen on 3/27/2024. Her Brachioplasty post operative course was complicated by left arm cellulitis since resolved with Keflex. Patient required bedside debridement and closure of left arm surgical incision with absorbable sutures 6/3/24.     Patient reports she is feeling overall well. Discussed combining hysterectomy with liposuction on 12/11/24. Preferred areas of liposuction include lateral chest. Pt notes small area of asymmetry between the breasts. Patient also reports bulging of lower abdominal wall with \"pressure\" that she describes as worse than last visit. CT AP done 11/8 showed intact mesh with post surgical changes.    Review of Systems  10 point ROS reviewed and negative except for what is stated in HPI    Objective   Physical Exam  Vitals and nursing note reviewed. Exam conducted with a chaperone present.   Constitutional:       General: She is not in acute distress.     Appearance: She is not ill-appearing.   Eyes:      Extraocular Movements: Extraocular movements intact.      Conjunctiva/sclera: Conjunctivae normal.      Pupils: Pupils are equal, round, and reactive to light.   Cardiovascular:      Rate and Rhythm: Normal rate and regular rhythm.      Pulses: Normal pulses.   Pulmonary:      Effort: Pulmonary effort is normal.      Breath sounds: Normal breath sounds.   Abdominal:      Palpations: Abdomen is soft. There is no mass. Some fullness noted superior to lower abdominal scar without " fluctuance or erythema     Tenderness: There is no abdominal tenderness.      Hernia: No hernia is present.   Musculoskeletal:         General: No swelling or tenderness.      Cervical back: Normal range of motion and neck supple.   Skin:     Capillary Refill: Capillary refill takes less than 2 seconds.      Coloration: Skin is not jaundiced.      Findings: No bruising or rash.   Neurological:      General: No focal deficit present.      Mental Status: She is oriented to person, place, and time.   Psychiatric:         Mood and Affect: Mood normal.         Behavior: Behavior normal.         Thought Content: Thought content normal.         Judgment: Judgment normal.   Focused exam of breasts: Absent nipples, well healed scars. Excess fatty tissue of lateral chest wall BL with some excess tissue of L lateral breast.     Assessment/Plan     I had the pleasure of seeing Ms. Rubio today.  She is scheduled to have hysterectomy 12/11/24 with Dr. Gonsales and Dr. Joseph. She is interested in Liposuction of excess fatty tissue at the lateral  left breast, and lateral chest, bilaterally.     Last time , we discussed possible fat grafting, but upon exam and based on patient's preference of having even smaller breast, fat grafting does not seem indicated for now.     I reviewed the procedure with patient and will be available to do the liposuction on December 11.     Plan:  Medical photos taken in clinic today   Mariangel Wright package info given to patient  12/11 OR booked for liposuction

## 2024-11-14 ENCOUNTER — TELEMEDICINE (OUTPATIENT)
Dept: GYNECOLOGIC ONCOLOGY | Facility: CLINIC | Age: 54
End: 2024-11-14
Payer: COMMERCIAL

## 2024-11-14 DIAGNOSIS — Z15.89 PALB2 GENE MUTATION POSITIVE: Primary | ICD-10-CM

## 2024-11-14 DIAGNOSIS — N93.9 ABNORMAL UTERINE BLEEDING (AUB): ICD-10-CM

## 2024-11-14 DIAGNOSIS — Z87.42 HISTORY OF ENDOMETRIOSIS: ICD-10-CM

## 2024-11-14 PROCEDURE — 99441 PR PHYS/QHP TELEPHONE EVALUATION 5-10 MIN: CPT | Performed by: STUDENT IN AN ORGANIZED HEALTH CARE EDUCATION/TRAINING PROGRAM

## 2024-11-14 PROCEDURE — 1036F TOBACCO NON-USER: CPT | Performed by: STUDENT IN AN ORGANIZED HEALTH CARE EDUCATION/TRAINING PROGRAM

## 2024-11-14 ASSESSMENT — COLUMBIA-SUICIDE SEVERITY RATING SCALE - C-SSRS
2. HAVE YOU ACTUALLY HAD ANY THOUGHTS OF KILLING YOURSELF?: NO
6. HAVE YOU EVER DONE ANYTHING, STARTED TO DO ANYTHING, OR PREPARED TO DO ANYTHING TO END YOUR LIFE?: NO
1. IN THE PAST MONTH, HAVE YOU WISHED YOU WERE DEAD OR WISHED YOU COULD GO TO SLEEP AND NOT WAKE UP?: NO

## 2024-11-14 ASSESSMENT — PATIENT HEALTH QUESTIONNAIRE - PHQ9
SUM OF ALL RESPONSES TO PHQ9 QUESTIONS 1 AND 2: 0
2. FEELING DOWN, DEPRESSED OR HOPELESS: NOT AT ALL
1. LITTLE INTEREST OR PLEASURE IN DOING THINGS: NOT AT ALL

## 2024-11-14 ASSESSMENT — PAIN SCALES - GENERAL: PAINLEVEL_OUTOF10: 5

## 2024-11-14 ASSESSMENT — ENCOUNTER SYMPTOMS
DEPRESSION: 0
OCCASIONAL FEELINGS OF UNSTEADINESS: 0
LOSS OF SENSATION IN FEET: 0

## 2024-11-14 NOTE — PROGRESS NOTES
Consent:  Verbal consent was requested and obtained from patient on this date for a telehealth visit.    Patient ID: Cheyanne Rubio is a 54 y.o. female.  Referring Physician: No referring provider defined for this encounter.  Primary Care Provider: No Assigned PCP Generic Provider, MD    Subjective    Patient wishes to discuss surgical planning after consultation with Dr Joseph. Ongoing pain with intercourse that is affecting her quality of life with persistent abdominal wall bulging sensation. Reports pain worse with meals - denies nausea/vomiting. Regular formed bowel movements with use of laxative. No other interim changes since last assessment; wishes to discuss surgical plan.    A comprehensive review of systems was performed and otherwise negative.      Objective    BSA: There is no height or weight on file to calculate BSA.  There were no vitals taken for this visit.     Physical Exam  General:   alert and oriented, in no acute distress   Cardiovascular: No apparent distress    Lungs: No increased work of breathing   Neurologic:  Grossly intact, no deficits     Recent Imaging:  CT Abdomen/Pelvis  Result Date: 11/8/24  IMPRESSION:  Postsurgical changes status post abdominal wall reconstruction with  mesh placement. Otherwise, no acute abdominopelvic pathology.    MR pelvis w and wo IV contrast  Result Date: 10/21/2024  Impression:   1.  Complex cystic lesion in the right ovary which may represent endometrioma or dermoid, unable to differentiate the lesions without a T1 weighted sequence (the patient may be called back at no additional charge to perform a T1 sequence if clinically warranted). The apparent enhancement on the subtraction imaging is favored to be due to misregistration artifact rather than true internal enhancement. Recommend assessment intraoperatively versus close follow-up with MRI in 6 months.   2. Uterine adenomyosis.   3. Unremarkable left ovary.       MACRO: None   Signed by: Paulina Raygoza  10/21/2024 10:48 AM Dictation workstation:   [id number]    Performance Status:  Symptomatic; in bed <50% of the day    Assessment/Plan    Oncology History    No history exists.      Cancer Staging   No matching staging information was found for the patient.     Diagnoses and all orders for this visit:  PALB2 gene mutation positive  - Previously counseled re: role of surgical risk reduction for slightly elevated ovarian cancer risk in setting of PALB2 mutation as well as endometriosis  History of endometriosis  Abnormal uterine bleeding (AUB)  - Previously counseled re: surgical risks, favor endometriosis on MRI imaging with findings supportive of examination findings re: uterosacral ligament nodularity  - CT with postsurgical changes s/p abdominal wall mesh placement without other acute pathology   - Plan to proceed with surgical resection with acceptable risk of increased complications r/t bowel injury.     Approx 6min spent in discussion with patient.      Yvette Gonsales MD

## 2024-11-18 ENCOUNTER — APPOINTMENT (OUTPATIENT)
Dept: PLASTIC SURGERY | Facility: CLINIC | Age: 54
End: 2024-11-18
Payer: COMMERCIAL

## 2024-11-18 VITALS
BODY MASS INDEX: 29.66 KG/M2 | SYSTOLIC BLOOD PRESSURE: 114 MMHG | DIASTOLIC BLOOD PRESSURE: 79 MMHG | WEIGHT: 189 LBS | RESPIRATION RATE: 20 BRPM | HEART RATE: 69 BPM

## 2024-11-18 DIAGNOSIS — L98.7 EXCESSIVE AND REDUNDANT SKIN AND SUBCUTANEOUS TISSUE: Primary | ICD-10-CM

## 2024-11-18 DIAGNOSIS — Z98.890 STATUS POST BREAST RECONSTRUCTION: ICD-10-CM

## 2024-11-18 PROCEDURE — 1036F TOBACCO NON-USER: CPT

## 2024-11-18 PROCEDURE — 99212 OFFICE O/P EST SF 10 MIN: CPT

## 2024-11-18 ASSESSMENT — PAIN SCALES - GENERAL: PAINLEVEL_OUTOF10: 0-NO PAIN

## 2024-11-30 NOTE — HOSPITAL COURSE
Cheyanne Rubio is a 54 y.o. female w/ pathogenic PALB2 mutation , history of endometriosis who was admitted for surgery and is now s/p robot-assisted hysterectomy, and salpingo-oophorectomy on 24. The patient is meeting all post-operative milestones including tolerating diet, passing flatus, voiding, ambulating, & pain controlled with oral medications. Patient was discharged home in satisfactory condition on post-operative day #1. Patient is scheduled to follow-up with Dr. Gonsales on 25, to discuss HRT at that appointment.    PMH: dysmenorrhea; history of thyroid nodule    PSH: bilateral risk reducing mastectomy     OB/GYN Hx: ; symptomatic climacteric   - Extensive history of IVF, IUI with 4mo pregnancy loss  - Last Pap ; denies prior abnormal      Social Hx:  Cheyanne Rubio  reports that she has never smoked. She has never been exposed to tobacco smoke. She has never used smokeless tobacco.  She  reports current alcohol use of about 2.0 standard drinks of alcohol per week. She  reports no history of drug use.  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 Otherwise, no acute abdominopelvic pathology.     MR pelvis w and wo IV contrast  Result Date: 10/21/2024  Impression:   1.  Complex cystic lesion in the right ovary which may represent endometrioma or dermoid, unable to differentiate the lesions without a T1 weighted sequence (the patient may be called back at no additional charge to perform a T1 sequence if clinically warranted). The apparent enhancement on the subtraction imaging is favored to be due to misregistration artifact rather than true internal enhancement. Recommend assessment intraoperatively versus close follow-up with MRI in 6 months.   2. Uterine adenomyosis.   3. Unremarkable left ovary.            Allergies  Oxycodone, Codeine-guaifenesin, Nitro transdermal, Tramadol, and Codeine

## 2024-12-05 DIAGNOSIS — N93.9 ABNORMAL UTERINE BLEEDING (AUB): ICD-10-CM

## 2024-12-10 ENCOUNTER — LAB (OUTPATIENT)
Dept: LAB | Facility: LAB | Age: 54
End: 2024-12-10
Payer: COMMERCIAL

## 2024-12-10 DIAGNOSIS — N93.9 ABNORMAL UTERINE BLEEDING (AUB): ICD-10-CM

## 2024-12-10 LAB
ABO GROUP (TYPE) IN BLOOD: NORMAL
ALBUMIN SERPL BCP-MCNC: 4.8 G/DL (ref 3.4–5)
ALP SERPL-CCNC: 90 U/L (ref 33–110)
ALT SERPL W P-5'-P-CCNC: 11 U/L (ref 7–45)
ANION GAP SERPL CALC-SCNC: 15 MMOL/L (ref 10–20)
ANTIBODY SCREEN: NORMAL
AST SERPL W P-5'-P-CCNC: 12 U/L (ref 9–39)
BILIRUB SERPL-MCNC: 0.6 MG/DL (ref 0–1.2)
BUN SERPL-MCNC: 9 MG/DL (ref 6–23)
CALCIUM SERPL-MCNC: 9.9 MG/DL (ref 8.6–10.6)
CHLORIDE SERPL-SCNC: 104 MMOL/L (ref 98–107)
CO2 SERPL-SCNC: 29 MMOL/L (ref 21–32)
CREAT SERPL-MCNC: 0.78 MG/DL (ref 0.5–1.05)
EGFRCR SERPLBLD CKD-EPI 2021: >90 ML/MIN/1.73M*2
ERYTHROCYTE [DISTWIDTH] IN BLOOD BY AUTOMATED COUNT: 12.3 % (ref 11.5–14.5)
GLUCOSE SERPL-MCNC: 80 MG/DL (ref 74–99)
HCT VFR BLD AUTO: 40.4 % (ref 36–46)
HGB BLD-MCNC: 13.4 G/DL (ref 12–16)
MCH RBC QN AUTO: 30.9 PG (ref 26–34)
MCHC RBC AUTO-ENTMCNC: 33.2 G/DL (ref 32–36)
MCV RBC AUTO: 93 FL (ref 80–100)
NRBC BLD-RTO: 0 /100 WBCS (ref 0–0)
PLATELET # BLD AUTO: 229 X10*3/UL (ref 150–450)
POTASSIUM SERPL-SCNC: 4.6 MMOL/L (ref 3.5–5.3)
PROT SERPL-MCNC: 7.1 G/DL (ref 6.4–8.2)
RBC # BLD AUTO: 4.33 X10*6/UL (ref 4–5.2)
RH FACTOR (ANTIGEN D): NORMAL
SODIUM SERPL-SCNC: 143 MMOL/L (ref 136–145)
WBC # BLD AUTO: 6.1 X10*3/UL (ref 4.4–11.3)

## 2024-12-10 PROCEDURE — 86900 BLOOD TYPING SEROLOGIC ABO: CPT

## 2024-12-10 PROCEDURE — 86850 RBC ANTIBODY SCREEN: CPT

## 2024-12-10 PROCEDURE — 86901 BLOOD TYPING SEROLOGIC RH(D): CPT

## 2024-12-10 PROCEDURE — 36415 COLL VENOUS BLD VENIPUNCTURE: CPT

## 2024-12-10 PROCEDURE — 80053 COMPREHEN METABOLIC PANEL: CPT

## 2024-12-10 PROCEDURE — 85027 COMPLETE CBC AUTOMATED: CPT

## 2024-12-11 ENCOUNTER — ANESTHESIA (OUTPATIENT)
Dept: OPERATING ROOM | Facility: HOSPITAL | Age: 54
End: 2024-12-11
Payer: COMMERCIAL

## 2024-12-11 ENCOUNTER — ANESTHESIA EVENT (OUTPATIENT)
Dept: OPERATING ROOM | Facility: HOSPITAL | Age: 54
End: 2024-12-11
Payer: COMMERCIAL

## 2024-12-11 ENCOUNTER — HOSPITAL ENCOUNTER (OUTPATIENT)
Facility: HOSPITAL | Age: 54
Discharge: HOME | End: 2024-12-12
Attending: STUDENT IN AN ORGANIZED HEALTH CARE EDUCATION/TRAINING PROGRAM | Admitting: STUDENT IN AN ORGANIZED HEALTH CARE EDUCATION/TRAINING PROGRAM
Payer: COMMERCIAL

## 2024-12-11 DIAGNOSIS — Z87.42 HISTORY OF ENDOMETRIOSIS: Primary | ICD-10-CM

## 2024-12-11 DIAGNOSIS — G89.18 POST-OPERATIVE PAIN: ICD-10-CM

## 2024-12-11 DIAGNOSIS — G89.18 POST-OP PAIN: ICD-10-CM

## 2024-12-11 DIAGNOSIS — R19.00 PELVIC MASS: ICD-10-CM

## 2024-12-11 PROBLEM — Z86.69 HISTORY OF MIGRAINE HEADACHES: Status: ACTIVE | Noted: 2024-12-11

## 2024-12-11 LAB — PREGNANCY TEST URINE, POC: NEGATIVE

## 2024-12-11 PROCEDURE — 2500000002 HC RX 250 W HCPCS SELF ADMINISTERED DRUGS (ALT 637 FOR MEDICARE OP, ALT 636 FOR OP/ED): Performed by: ANESTHESIOLOGY

## 2024-12-11 PROCEDURE — G0378 HOSPITAL OBSERVATION PER HR: HCPCS

## 2024-12-11 PROCEDURE — 36415 COLL VENOUS BLD VENIPUNCTURE: CPT | Performed by: ANESTHESIOLOGY

## 2024-12-11 PROCEDURE — 81025 URINE PREGNANCY TEST: CPT | Performed by: STUDENT IN AN ORGANIZED HEALTH CARE EDUCATION/TRAINING PROGRAM

## 2024-12-11 PROCEDURE — 2500000005 HC RX 250 GENERAL PHARMACY W/O HCPCS: Performed by: STUDENT IN AN ORGANIZED HEALTH CARE EDUCATION/TRAINING PROGRAM

## 2024-12-11 PROCEDURE — 3700000001 HC GENERAL ANESTHESIA TIME - INITIAL BASE CHARGE: Performed by: STUDENT IN AN ORGANIZED HEALTH CARE EDUCATION/TRAINING PROGRAM

## 2024-12-11 PROCEDURE — 7100000002 HC RECOVERY ROOM TIME - EACH INCREMENTAL 1 MINUTE: Performed by: STUDENT IN AN ORGANIZED HEALTH CARE EDUCATION/TRAINING PROGRAM

## 2024-12-11 PROCEDURE — 88307 TISSUE EXAM BY PATHOLOGIST: CPT | Mod: TC,SUR | Performed by: STUDENT IN AN ORGANIZED HEALTH CARE EDUCATION/TRAINING PROGRAM

## 2024-12-11 PROCEDURE — A58571 PR LAPAROSCOPY W TOT HYSTERECTUTERUS <=250 GRAM  W TUBE/OVARY: Performed by: ANESTHESIOLOGY

## 2024-12-11 PROCEDURE — 2500000004 HC RX 250 GENERAL PHARMACY W/ HCPCS (ALT 636 FOR OP/ED): Performed by: ANESTHESIOLOGY

## 2024-12-11 PROCEDURE — 2500000005 HC RX 250 GENERAL PHARMACY W/O HCPCS: Performed by: ANESTHESIOLOGY

## 2024-12-11 PROCEDURE — A58571 PR LAPAROSCOPY W TOT HYSTERECTUTERUS <=250 GRAM  W TUBE/OVARY

## 2024-12-11 PROCEDURE — 49320 DIAG LAPARO SEPARATE PROC: CPT | Performed by: SURGERY

## 2024-12-11 PROCEDURE — 3600000018 HC OR TIME - INITIAL BASE CHARGE - PROCEDURE LEVEL SIX: Performed by: STUDENT IN AN ORGANIZED HEALTH CARE EDUCATION/TRAINING PROGRAM

## 2024-12-11 PROCEDURE — 2500000001 HC RX 250 WO HCPCS SELF ADMINISTERED DRUGS (ALT 637 FOR MEDICARE OP)

## 2024-12-11 PROCEDURE — 2500000004 HC RX 250 GENERAL PHARMACY W/ HCPCS (ALT 636 FOR OP/ED)

## 2024-12-11 PROCEDURE — 2500000001 HC RX 250 WO HCPCS SELF ADMINISTERED DRUGS (ALT 637 FOR MEDICARE OP): Performed by: STUDENT IN AN ORGANIZED HEALTH CARE EDUCATION/TRAINING PROGRAM

## 2024-12-11 PROCEDURE — 7100000001 HC RECOVERY ROOM TIME - INITIAL BASE CHARGE: Performed by: STUDENT IN AN ORGANIZED HEALTH CARE EDUCATION/TRAINING PROGRAM

## 2024-12-11 PROCEDURE — 88307 TISSUE EXAM BY PATHOLOGIST: CPT | Performed by: PATHOLOGY

## 2024-12-11 PROCEDURE — 2720000007 HC OR 272 NO HCPCS: Performed by: STUDENT IN AN ORGANIZED HEALTH CARE EDUCATION/TRAINING PROGRAM

## 2024-12-11 PROCEDURE — 3700000002 HC GENERAL ANESTHESIA TIME - EACH INCREMENTAL 1 MINUTE: Performed by: STUDENT IN AN ORGANIZED HEALTH CARE EDUCATION/TRAINING PROGRAM

## 2024-12-11 PROCEDURE — 3600000017 HC OR TIME - EACH INCREMENTAL 1 MINUTE - PROCEDURE LEVEL SIX: Performed by: STUDENT IN AN ORGANIZED HEALTH CARE EDUCATION/TRAINING PROGRAM

## 2024-12-11 RX ORDER — OXYCODONE HYDROCHLORIDE 5 MG/1
5 TABLET ORAL EVERY 4 HOURS PRN
Status: CANCELLED | OUTPATIENT
Start: 2024-12-11

## 2024-12-11 RX ORDER — POLYETHYLENE GLYCOL 3350 17 G/17G
17 POWDER, FOR SOLUTION ORAL DAILY
Status: DISCONTINUED | OUTPATIENT
Start: 2024-12-11 | End: 2024-12-12 | Stop reason: HOSPADM

## 2024-12-11 RX ORDER — APREPITANT 40 MG/1
40 CAPSULE ORAL ONCE
Status: COMPLETED | OUTPATIENT
Start: 2024-12-11 | End: 2024-12-11

## 2024-12-11 RX ORDER — ROCURONIUM BROMIDE 10 MG/ML
INJECTION, SOLUTION INTRAVENOUS AS NEEDED
Status: DISCONTINUED | OUTPATIENT
Start: 2024-12-11 | End: 2024-12-11

## 2024-12-11 RX ORDER — SODIUM CHLORIDE 0.9 G/100ML
IRRIGANT IRRIGATION AS NEEDED
Status: DISCONTINUED | OUTPATIENT
Start: 2024-12-11 | End: 2024-12-11 | Stop reason: HOSPADM

## 2024-12-11 RX ORDER — KETOROLAC TROMETHAMINE 30 MG/ML
30 INJECTION, SOLUTION INTRAMUSCULAR; INTRAVENOUS EVERY 6 HOURS
Status: DISCONTINUED | OUTPATIENT
Start: 2024-12-11 | End: 2024-12-12 | Stop reason: HOSPADM

## 2024-12-11 RX ORDER — PROPOFOL 10 MG/ML
INJECTION, EMULSION INTRAVENOUS AS NEEDED
Status: DISCONTINUED | OUTPATIENT
Start: 2024-12-11 | End: 2024-12-11

## 2024-12-11 RX ORDER — HYDROMORPHONE HYDROCHLORIDE 1 MG/ML
INJECTION, SOLUTION INTRAMUSCULAR; INTRAVENOUS; SUBCUTANEOUS AS NEEDED
Status: DISCONTINUED | OUTPATIENT
Start: 2024-12-11 | End: 2024-12-11

## 2024-12-11 RX ORDER — LABETALOL HYDROCHLORIDE 5 MG/ML
5 INJECTION, SOLUTION INTRAVENOUS ONCE AS NEEDED
Status: DISCONTINUED | OUTPATIENT
Start: 2024-12-11 | End: 2024-12-11 | Stop reason: HOSPADM

## 2024-12-11 RX ORDER — ONDANSETRON HYDROCHLORIDE 2 MG/ML
INJECTION, SOLUTION INTRAVENOUS AS NEEDED
Status: DISCONTINUED | OUTPATIENT
Start: 2024-12-11 | End: 2024-12-11

## 2024-12-11 RX ORDER — MIDAZOLAM HYDROCHLORIDE 1 MG/ML
INJECTION INTRAMUSCULAR; INTRAVENOUS AS NEEDED
Status: DISCONTINUED | OUTPATIENT
Start: 2024-12-11 | End: 2024-12-11

## 2024-12-11 RX ORDER — OXYCODONE HYDROCHLORIDE 5 MG/1
5 TABLET ORAL EVERY 4 HOURS PRN
Status: DISCONTINUED | OUTPATIENT
Start: 2024-12-11 | End: 2024-12-12 | Stop reason: HOSPADM

## 2024-12-11 RX ORDER — SCOLOPAMINE TRANSDERMAL SYSTEM 1 MG/1
PATCH, EXTENDED RELEASE TRANSDERMAL AS NEEDED
Status: DISCONTINUED | OUTPATIENT
Start: 2024-12-11 | End: 2024-12-11

## 2024-12-11 RX ORDER — MAGNESIUM SULFATE HEPTAHYDRATE 500 MG/ML
INJECTION, SOLUTION INTRAMUSCULAR; INTRAVENOUS AS NEEDED
Status: DISCONTINUED | OUTPATIENT
Start: 2024-12-11 | End: 2024-12-11

## 2024-12-11 RX ORDER — LIDOCAINE HCL/PF 100 MG/5ML
SYRINGE (ML) INTRAVENOUS AS NEEDED
Status: DISCONTINUED | OUTPATIENT
Start: 2024-12-11 | End: 2024-12-11

## 2024-12-11 RX ORDER — ACETAMINOPHEN 325 MG/1
650 TABLET ORAL EVERY 4 HOURS PRN
Status: DISCONTINUED | OUTPATIENT
Start: 2024-12-11 | End: 2024-12-11 | Stop reason: HOSPADM

## 2024-12-11 RX ORDER — ONDANSETRON 4 MG/1
4 TABLET, FILM COATED ORAL EVERY 6 HOURS PRN
Qty: 20 TABLET | Refills: 0 | Status: SHIPPED | OUTPATIENT
Start: 2024-12-11

## 2024-12-11 RX ORDER — OXYCODONE HYDROCHLORIDE 10 MG/1
10 TABLET ORAL EVERY 4 HOURS PRN
Status: DISCONTINUED | OUTPATIENT
Start: 2024-12-11 | End: 2024-12-12 | Stop reason: HOSPADM

## 2024-12-11 RX ORDER — IBUPROFEN 600 MG/1
600 TABLET ORAL EVERY 6 HOURS PRN
Qty: 50 TABLET | Refills: 0 | Status: SHIPPED | OUTPATIENT
Start: 2024-12-11

## 2024-12-11 RX ORDER — NALOXONE HYDROCHLORIDE 0.4 MG/ML
0.1 INJECTION, SOLUTION INTRAMUSCULAR; INTRAVENOUS; SUBCUTANEOUS EVERY 5 MIN PRN
Status: DISCONTINUED | OUTPATIENT
Start: 2024-12-11 | End: 2024-12-12 | Stop reason: HOSPADM

## 2024-12-11 RX ORDER — ACETAMINOPHEN 325 MG/1
975 TABLET ORAL EVERY 6 HOURS PRN
Qty: 50 TABLET | Refills: 0 | Status: SHIPPED | OUTPATIENT
Start: 2024-12-11

## 2024-12-11 RX ORDER — OXYCODONE HYDROCHLORIDE 5 MG/1
5 TABLET ORAL EVERY 6 HOURS PRN
Qty: 12 TABLET | Refills: 0 | Status: SHIPPED | OUTPATIENT
Start: 2024-12-11

## 2024-12-11 RX ORDER — ONDANSETRON HYDROCHLORIDE 2 MG/ML
4 INJECTION, SOLUTION INTRAVENOUS ONCE AS NEEDED
Status: COMPLETED | OUTPATIENT
Start: 2024-12-11 | End: 2024-12-11

## 2024-12-11 RX ORDER — IBUPROFEN 600 MG/1
600 TABLET ORAL EVERY 6 HOURS
Status: DISCONTINUED | OUTPATIENT
Start: 2024-12-12 | End: 2024-12-12 | Stop reason: HOSPADM

## 2024-12-11 RX ORDER — ACETAMINOPHEN 325 MG/1
975 TABLET ORAL ONCE
Status: COMPLETED | OUTPATIENT
Start: 2024-12-11 | End: 2024-12-11

## 2024-12-11 RX ORDER — IBUPROFEN 600 MG/1
600 TABLET ORAL EVERY 6 HOURS PRN
COMMUNITY
End: 2024-12-12 | Stop reason: HOSPADM

## 2024-12-11 RX ORDER — WATER
600 LIQUID (ML) MISCELLANEOUS EVERY 6 HOURS SCHEDULED
Status: DISCONTINUED | OUTPATIENT
Start: 2024-12-11 | End: 2024-12-12

## 2024-12-11 RX ORDER — SIMETHICONE 80 MG
80 TABLET,CHEWABLE ORAL 4 TIMES DAILY PRN
Status: DISCONTINUED | OUTPATIENT
Start: 2024-12-11 | End: 2024-12-12 | Stop reason: HOSPADM

## 2024-12-11 RX ORDER — FENTANYL CITRATE 50 UG/ML
INJECTION, SOLUTION INTRAMUSCULAR; INTRAVENOUS AS NEEDED
Status: DISCONTINUED | OUTPATIENT
Start: 2024-12-11 | End: 2024-12-11

## 2024-12-11 RX ORDER — GABAPENTIN 600 MG/1
600 TABLET ORAL ONCE
Status: COMPLETED | OUTPATIENT
Start: 2024-12-11 | End: 2024-12-11

## 2024-12-11 RX ORDER — ASPIRIN 81 MG/1
81 TABLET ORAL DAILY
COMMUNITY

## 2024-12-11 RX ORDER — CELECOXIB 200 MG/1
400 CAPSULE ORAL ONCE
Status: COMPLETED | OUTPATIENT
Start: 2024-12-11 | End: 2024-12-11

## 2024-12-11 RX ORDER — SODIUM CHLORIDE, SODIUM LACTATE, POTASSIUM CHLORIDE, CALCIUM CHLORIDE 600; 310; 30; 20 MG/100ML; MG/100ML; MG/100ML; MG/100ML
20 INJECTION, SOLUTION INTRAVENOUS CONTINUOUS
Status: DISCONTINUED | OUTPATIENT
Start: 2024-12-11 | End: 2024-12-12 | Stop reason: HOSPADM

## 2024-12-11 RX ORDER — ALBUTEROL SULFATE 0.83 MG/ML
2.5 SOLUTION RESPIRATORY (INHALATION) ONCE AS NEEDED
Status: DISCONTINUED | OUTPATIENT
Start: 2024-12-11 | End: 2024-12-11 | Stop reason: HOSPADM

## 2024-12-11 RX ORDER — ACETAMINOPHEN 325 MG/1
3 TABLET ORAL EVERY 6 HOURS PRN
COMMUNITY
End: 2024-12-12 | Stop reason: HOSPADM

## 2024-12-11 RX ORDER — CEFAZOLIN 1 G/1
INJECTION, POWDER, FOR SOLUTION INTRAVENOUS AS NEEDED
Status: DISCONTINUED | OUTPATIENT
Start: 2024-12-11 | End: 2024-12-11

## 2024-12-11 RX ORDER — WATER 1 ML/ML
IRRIGANT IRRIGATION AS NEEDED
Status: DISCONTINUED | OUTPATIENT
Start: 2024-12-11 | End: 2024-12-11 | Stop reason: HOSPADM

## 2024-12-11 RX ORDER — APREPITANT 40 MG/1
40 CAPSULE ORAL DAILY
Status: DISCONTINUED | OUTPATIENT
Start: 2024-12-11 | End: 2024-12-11

## 2024-12-11 RX ORDER — METHOCARBAMOL 100 MG/ML
1000 INJECTION, SOLUTION INTRAMUSCULAR; INTRAVENOUS ONCE
Status: COMPLETED | OUTPATIENT
Start: 2024-12-11 | End: 2024-12-11

## 2024-12-11 RX ORDER — ONDANSETRON HYDROCHLORIDE 2 MG/ML
4 INJECTION, SOLUTION INTRAVENOUS EVERY 6 HOURS PRN
Status: DISCONTINUED | OUTPATIENT
Start: 2024-12-11 | End: 2024-12-12 | Stop reason: HOSPADM

## 2024-12-11 RX ORDER — ACETAMINOPHEN 325 MG/1
975 TABLET ORAL EVERY 6 HOURS
Status: DISCONTINUED | OUTPATIENT
Start: 2024-12-11 | End: 2024-12-12 | Stop reason: HOSPADM

## 2024-12-11 RX ORDER — HYDROMORPHONE HYDROCHLORIDE 1 MG/ML
0.4 INJECTION, SOLUTION INTRAMUSCULAR; INTRAVENOUS; SUBCUTANEOUS
Status: DISCONTINUED | OUTPATIENT
Start: 2024-12-11 | End: 2024-12-12 | Stop reason: HOSPADM

## 2024-12-11 RX ORDER — POLYETHYLENE GLYCOL 3350 17 G/17G
17 POWDER, FOR SOLUTION ORAL DAILY PRN
Qty: 10 PACKET | Refills: 0 | Status: SHIPPED | OUTPATIENT
Start: 2024-12-11

## 2024-12-11 SDOH — ECONOMIC STABILITY: FOOD INSECURITY: WITHIN THE PAST 12 MONTHS, YOU WORRIED THAT YOUR FOOD WOULD RUN OUT BEFORE YOU GOT THE MONEY TO BUY MORE.: NEVER TRUE

## 2024-12-11 SDOH — ECONOMIC STABILITY: FOOD INSECURITY: WITHIN THE PAST 12 MONTHS, THE FOOD YOU BOUGHT JUST DIDN'T LAST AND YOU DIDN'T HAVE MONEY TO GET MORE.: NEVER TRUE

## 2024-12-11 SDOH — SOCIAL STABILITY: SOCIAL INSECURITY
WITHIN THE LAST YEAR, HAVE YOU BEEN RAPED OR FORCED TO HAVE ANY KIND OF SEXUAL ACTIVITY BY YOUR PARTNER OR EX-PARTNER?: NO

## 2024-12-11 SDOH — HEALTH STABILITY: MENTAL HEALTH: CURRENT SMOKER: 0

## 2024-12-11 SDOH — ECONOMIC STABILITY: HOUSING INSECURITY: IN THE LAST 12 MONTHS, WAS THERE A TIME WHEN YOU WERE NOT ABLE TO PAY THE MORTGAGE OR RENT ON TIME?: NO

## 2024-12-11 SDOH — SOCIAL STABILITY: SOCIAL INSECURITY: HAS ANYONE EVER THREATENED TO HURT YOUR FAMILY OR YOUR PETS?: NO

## 2024-12-11 SDOH — SOCIAL STABILITY: SOCIAL INSECURITY: WITHIN THE LAST YEAR, HAVE YOU BEEN HUMILIATED OR EMOTIONALLY ABUSED IN OTHER WAYS BY YOUR PARTNER OR EX-PARTNER?: NO

## 2024-12-11 SDOH — SOCIAL STABILITY: SOCIAL INSECURITY: ARE THERE ANY APPARENT SIGNS OF INJURIES/BEHAVIORS THAT COULD BE RELATED TO ABUSE/NEGLECT?: NO

## 2024-12-11 SDOH — SOCIAL STABILITY: SOCIAL INSECURITY: WERE YOU ABLE TO COMPLETE ALL THE BEHAVIORAL HEALTH SCREENINGS?: YES

## 2024-12-11 SDOH — ECONOMIC STABILITY: HOUSING INSECURITY: AT ANY TIME IN THE PAST 12 MONTHS, WERE YOU HOMELESS OR LIVING IN A SHELTER (INCLUDING NOW)?: NO

## 2024-12-11 SDOH — SOCIAL STABILITY: SOCIAL INSECURITY: HAVE YOU HAD THOUGHTS OF HARMING ANYONE ELSE?: NO

## 2024-12-11 SDOH — SOCIAL STABILITY: SOCIAL INSECURITY: WITHIN THE LAST YEAR, HAVE YOU BEEN AFRAID OF YOUR PARTNER OR EX-PARTNER?: NO

## 2024-12-11 SDOH — SOCIAL STABILITY: SOCIAL INSECURITY: HAVE YOU HAD ANY THOUGHTS OF HARMING ANYONE ELSE?: NO

## 2024-12-11 SDOH — ECONOMIC STABILITY: INCOME INSECURITY: IN THE PAST 12 MONTHS HAS THE ELECTRIC, GAS, OIL, OR WATER COMPANY THREATENED TO SHUT OFF SERVICES IN YOUR HOME?: NO

## 2024-12-11 SDOH — SOCIAL STABILITY: SOCIAL INSECURITY
WITHIN THE LAST YEAR, HAVE YOU BEEN KICKED, HIT, SLAPPED, OR OTHERWISE PHYSICALLY HURT BY YOUR PARTNER OR EX-PARTNER?: NO

## 2024-12-11 SDOH — SOCIAL STABILITY: SOCIAL INSECURITY: ABUSE: ADULT

## 2024-12-11 SDOH — ECONOMIC STABILITY: HOUSING INSECURITY: IN THE PAST 12 MONTHS, HOW MANY TIMES HAVE YOU MOVED WHERE YOU WERE LIVING?: 0

## 2024-12-11 SDOH — ECONOMIC STABILITY: FOOD INSECURITY: HOW HARD IS IT FOR YOU TO PAY FOR THE VERY BASICS LIKE FOOD, HOUSING, MEDICAL CARE, AND HEATING?: NOT VERY HARD

## 2024-12-11 SDOH — SOCIAL STABILITY: SOCIAL INSECURITY: DO YOU FEEL UNSAFE GOING BACK TO THE PLACE WHERE YOU ARE LIVING?: NO

## 2024-12-11 SDOH — SOCIAL STABILITY: SOCIAL INSECURITY: DO YOU FEEL ANYONE HAS EXPLOITED OR TAKEN ADVANTAGE OF YOU FINANCIALLY OR OF YOUR PERSONAL PROPERTY?: NO

## 2024-12-11 SDOH — SOCIAL STABILITY: SOCIAL INSECURITY: DOES ANYONE TRY TO KEEP YOU FROM HAVING/CONTACTING OTHER FRIENDS OR DOING THINGS OUTSIDE YOUR HOME?: NO

## 2024-12-11 SDOH — ECONOMIC STABILITY: TRANSPORTATION INSECURITY: IN THE PAST 12 MONTHS, HAS LACK OF TRANSPORTATION KEPT YOU FROM MEDICAL APPOINTMENTS OR FROM GETTING MEDICATIONS?: NO

## 2024-12-11 SDOH — SOCIAL STABILITY: SOCIAL INSECURITY: ARE YOU OR HAVE YOU BEEN THREATENED OR ABUSED PHYSICALLY, EMOTIONALLY, OR SEXUALLY BY ANYONE?: NO

## 2024-12-11 ASSESSMENT — PATIENT HEALTH QUESTIONNAIRE - PHQ9
1. LITTLE INTEREST OR PLEASURE IN DOING THINGS: NOT AT ALL
SUM OF ALL RESPONSES TO PHQ9 QUESTIONS 1 & 2: 0
2. FEELING DOWN, DEPRESSED OR HOPELESS: NOT AT ALL

## 2024-12-11 ASSESSMENT — COGNITIVE AND FUNCTIONAL STATUS - GENERAL
DRESSING REGULAR LOWER BODY CLOTHING: A LITTLE
DRESSING REGULAR UPPER BODY CLOTHING: A LITTLE
WALKING IN HOSPITAL ROOM: A LITTLE
TOILETING: A LITTLE
PERSONAL GROOMING: A LITTLE
TOILETING: A LITTLE
STANDING UP FROM CHAIR USING ARMS: A LITTLE
TURNING FROM BACK TO SIDE WHILE IN FLAT BAD: A LITTLE
CLIMB 3 TO 5 STEPS WITH RAILING: A LITTLE
MOVING TO AND FROM BED TO CHAIR: A LITTLE
MOBILITY SCORE: 18
MOVING FROM LYING ON BACK TO SITTING ON SIDE OF FLAT BED WITH BEDRAILS: A LITTLE
DAILY ACTIVITIY SCORE: 21
TURNING FROM BACK TO SIDE WHILE IN FLAT BAD: A LITTLE
MOVING TO AND FROM BED TO CHAIR: A LITTLE
PATIENT BASELINE BEDBOUND: NO
EATING MEALS: A LITTLE
HELP NEEDED FOR BATHING: A LITTLE
CLIMB 3 TO 5 STEPS WITH RAILING: A LITTLE
MOVING FROM LYING ON BACK TO SITTING ON SIDE OF FLAT BED WITH BEDRAILS: A LITTLE
STANDING UP FROM CHAIR USING ARMS: A LITTLE
DRESSING REGULAR UPPER BODY CLOTHING: A LITTLE
DAILY ACTIVITIY SCORE: 18
WALKING IN HOSPITAL ROOM: A LITTLE
MOBILITY SCORE: 18
DRESSING REGULAR LOWER BODY CLOTHING: A LITTLE

## 2024-12-11 ASSESSMENT — PAIN SCALES - GENERAL
PAINLEVEL_OUTOF10: 0 - NO PAIN
PAINLEVEL_OUTOF10: 9
PAINLEVEL_OUTOF10: 10 - WORST POSSIBLE PAIN
PAINLEVEL_OUTOF10: 5 - MODERATE PAIN
PAINLEVEL_OUTOF10: 8
PAINLEVEL_OUTOF10: 5 - MODERATE PAIN
PAINLEVEL_OUTOF10: 9
PAINLEVEL_OUTOF10: 8

## 2024-12-11 ASSESSMENT — PAIN DESCRIPTION - DESCRIPTORS: DESCRIPTORS: ACHING

## 2024-12-11 ASSESSMENT — ACTIVITIES OF DAILY LIVING (ADL)
HEARING - RIGHT EAR: FUNCTIONAL
TOILETING: INDEPENDENT
PATIENT'S MEMORY ADEQUATE TO SAFELY COMPLETE DAILY ACTIVITIES?: YES
DRESSING YOURSELF: INDEPENDENT
GROOMING: INDEPENDENT
HEARING - LEFT EAR: FUNCTIONAL
LACK_OF_TRANSPORTATION: NO
BATHING: INDEPENDENT
ADEQUATE_TO_COMPLETE_ADL: YES
WALKS IN HOME: INDEPENDENT
FEEDING YOURSELF: INDEPENDENT
LACK_OF_TRANSPORTATION: NO
JUDGMENT_ADEQUATE_SAFELY_COMPLETE_DAILY_ACTIVITIES: YES

## 2024-12-11 ASSESSMENT — LIFESTYLE VARIABLES
AUDIT-C TOTAL SCORE: 0
HOW OFTEN DO YOU HAVE A DRINK CONTAINING ALCOHOL: NEVER
SKIP TO QUESTIONS 9-10: 1
PRESCIPTION_ABUSE_PAST_12_MONTHS: NO
HOW MANY STANDARD DRINKS CONTAINING ALCOHOL DO YOU HAVE ON A TYPICAL DAY: PATIENT DOES NOT DRINK
AUDIT-C TOTAL SCORE: 0
HOW OFTEN DO YOU HAVE 6 OR MORE DRINKS ON ONE OCCASION: NEVER
SUBSTANCE_ABUSE_PAST_12_MONTHS: NO

## 2024-12-11 ASSESSMENT — COLUMBIA-SUICIDE SEVERITY RATING SCALE - C-SSRS
1. IN THE PAST MONTH, HAVE YOU WISHED YOU WERE DEAD OR WISHED YOU COULD GO TO SLEEP AND NOT WAKE UP?: NO
2. HAVE YOU ACTUALLY HAD ANY THOUGHTS OF KILLING YOURSELF?: NO
2. HAVE YOU ACTUALLY HAD ANY THOUGHTS OF KILLING YOURSELF?: NO
1. IN THE PAST MONTH, HAVE YOU WISHED YOU WERE DEAD OR WISHED YOU COULD GO TO SLEEP AND NOT WAKE UP?: NO
6. HAVE YOU EVER DONE ANYTHING, STARTED TO DO ANYTHING, OR PREPARED TO DO ANYTHING TO END YOUR LIFE?: NO
6. HAVE YOU EVER DONE ANYTHING, STARTED TO DO ANYTHING, OR PREPARED TO DO ANYTHING TO END YOUR LIFE?: NO

## 2024-12-11 ASSESSMENT — PAIN - FUNCTIONAL ASSESSMENT
PAIN_FUNCTIONAL_ASSESSMENT: 0-10
PAIN_FUNCTIONAL_ASSESSMENT: UNABLE TO SELF-REPORT
PAIN_FUNCTIONAL_ASSESSMENT: 0-10
PAIN_FUNCTIONAL_ASSESSMENT: UNABLE TO SELF-REPORT
PAIN_FUNCTIONAL_ASSESSMENT: 0-10

## 2024-12-11 NOTE — ANESTHESIA POSTPROCEDURE EVALUATION
Patient: Cheyanne Rubio    Procedure Summary       Date: 12/11/24 Room / Location: [facility] OR 03 / [facility] [place], [place] MOS OR    Anesthesia Start: 1235 Anesthesia Stop: 1543    Procedure: Robot-assisted bilateral salpingo-oophorectomy total laparoscopic hysterectomy, bilateral TAP block (Bilateral) Diagnosis:       History of endometriosis      Pelvic mass      (History of endometriosis [Z87.42])      (Pelvic mass [R19.00])    Surgeons: Yvette Gonsales MD Responsible Provider: Buddy Fisher DO    Anesthesia Type: general ASA Status: 2            Anesthesia Type: general    Vitals Value Taken Time   /76 12/11/24 1715   Temp 36.2 °C (97.2 °F) 12/11/24 1539   Pulse 70 12/11/24 1731   Resp 13 12/11/24 1730   SpO2 100 % 12/11/24 1731   Vitals shown include unfiled device data.    Anesthesia Post Evaluation    Patient location during evaluation: PACU  Patient participation: complete - patient participated  Level of consciousness: awake and alert  Pain management: adequate  Airway patency: patent  Cardiovascular status: acceptable and blood pressure returned to baseline  Respiratory status: acceptable  Hydration status: acceptable  Postoperative Nausea and Vomiting: none        There were no known notable events for this encounter.

## 2024-12-11 NOTE — ANESTHESIA PREPROCEDURE EVALUATION
Patient: Cheyanne Rubio    Procedure Information       Date/Time: 12/11/24 1220    Procedures:       Robot-assisted bilateral salpingo-oophorectomy, possible hysterectomy, possible staging, all indicated procedures (Bilateral)      Liposuction Chest (Bilateral)      Injection Therapeutic Agent Skin (Bilateral)      Insertion Adipose Tissue Breast (Bilateral)    Location: [facility] OR 03 / [facility] OR    Surgeons: Yvette Gonsales MD; Crystal Gorman MD            Relevant Problems   Anesthesia   (+) PONV (postoperative nausea and vomiting)      Pulmonary   (+) Recent URI      Neuro   (+) History of migraine headaches      /Renal (within normal limits)      Endocrine   (+) Enlarged thyroid      Hematology   (+) Anemia      HEENT   (+) Seasonal allergies      GYN  Ovarian mass   (+) Abnormal uterine bleeding (AUB)     Vitals:    12/11/24 1054   BP: 122/73   Pulse: 67   Resp: 18   Temp: 36.4 °C (97.5 °F)   SpO2: 96%         Past Surgical History:   Procedure Laterality Date    CT ABDOMEN PELVIS ANGIOGRAM W AND/OR WO IV CONTRAST  05/22/2023    CT ABDOMEN PELVIS ANGIOGRAM W AND/OR WO IV CONTRAST 5/22/2023 U CT    DILATION AND CURETTAGE OF UTERUS  10/20/2014    Dilation And Curettage    EXPLORATORY LAPAROTOMY      HYSTEROSCOPY      LAPAROSCOPY DIAGNOSTIC / BIOPSY / ASPIRATION / LYSIS  10/20/2014    Exploratory Laparoscopy    OTHER SURGICAL HISTORY  10/20/2014    Hysteroscopy     Past Medical History:   Diagnosis Date    Abnormal uterine and vaginal bleeding, unspecified 06/08/2018    Abnormal uterine bleeding (AUB)    Acute bronchitis, unspecified 06/08/2018    Acute bronchitis    Acute pharyngitis, unspecified 06/08/2018    Pharyngitis    Anemia, unspecified 06/08/2018    Anemia    Benign neoplasm of unspecified ovary     Dermoid cyst of ovary    Dysmenorrhea, unspecified 06/08/2018    Dysmenorrhea    Dysuria 06/08/2018    Dysuria    Encounter for screening mammogram for malignant neoplasm of breast 06/08/2018     Visit for screening mammogram    Histoplasmosis, 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    Monoallelic mutation of PALB2 gene     Other ovarian dysfunction 10/24/2014    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    Pain in unspecified joint 06/08/2018    Arthralgia of multiple sites    Personal history of other diseases of the female genital tract     Personal history of endometriosis    Personal history of other diseases of the female genital tract     Personal history of female infertility    Polyp of corpus uteri     Uterine polyp    Stricture and stenosis of cervix uteri     Cervical stenosis (uterine cervix)    Thyroid nodule     Vitamin D deficiency, unspecified 06/08/2018    Vitamin D deficiency       Current Facility-Administered Medications:     lactated Ringer's infusion, 20 mL/hr, intravenous, Continuous, Yvette Gonsales MD  Prior to Admission medications    Medication Sig Start Date End Date Taking? Authorizing Provider   B complex-vitamin C-folic acid (Nephro-Monie Rx) 1- mg-mg-mcg tablet Take 1 tablet by mouth once daily with breakfast.    Historical Provider, MD   biotin 5 mg capsule Take 1 capsule (5 mg) by mouth once daily.    Historical Provider, MD   brimonidine 0.025 % drops Administer 1 drop into affected eye(s) 2 times a day as needed (red eye).    Historical Provider, MD   calcium carbonate (CALTRATE 600 ORAL) Take 1 tablet by mouth once daily.    Historical Provider, MD   fexofenadine (Allegra) 180 mg tablet Take 1 tablet (180 mg) by mouth once daily.    Historical Provider, MD   fezolinetant (Veozah) 45 mg tablet Take 45 mg by mouth once daily.    Historical Provider, MD   methocarbamol (Robaxin) 500 mg tablet Take 1 tablet (500 mg) by mouth 4 times a day as needed for muscle spasms for up to 14 days.  Patient not taking: Reported on 10/17/2024 5/28/24 6/11/24  Nicola Baron, APRN-CNP   psyllium (Metamucil) 0.4 gram capsule Take 5 capsules by mouth once  daily.    Historical Provider, MD     Allergies   Allergen Reactions    Oxycodone Confusion    Codeine-Guaifenesin Other    Nitro Transdermal Other     Pt stated she has a lot of nausea when cream applied    Tramadol Insomnia    Codeine Nausea/vomiting, Rash and Other     Social History     Tobacco Use    Smoking status: Never     Passive exposure: Never    Smokeless tobacco: Never   Substance Use Topics    Alcohol use: Yes     Alcohol/week: 2.0 standard drinks of alcohol     Types: 2 Glasses of wine per week     Comment: occasionally         Chemistry    Lab Results   Component Value Date/Time     12/10/2024 0821    K 4.6 12/10/2024 0821     12/10/2024 0821    CO2 29 12/10/2024 0821    BUN 9 12/10/2024 0821    CREATININE 0.78 12/10/2024 0821    Lab Results   Component Value Date/Time    CALCIUM 9.9 12/10/2024 0821    ALKPHOS 90 12/10/2024 0821    AST 12 12/10/2024 0821    ALT 11 12/10/2024 0821    BILITOT 0.6 12/10/2024 0821          Lab Results   Component Value Date/Time    WBC 6.1 12/10/2024 0821    HGB 13.4 12/10/2024 0821    HCT 40.4 12/10/2024 0821     12/10/2024 0821     Lab Results   Component Value Date/Time    PROTIME 9.6 (L) 05/09/2023 0709    INR 0.8 (L) 05/09/2023 0709   .    Clinical information reviewed:   Tobacco  Allergies  Meds   Med Hx  Surg Hx   Fam Hx  Soc Hx        NPO Detail:  NPO/Void Status  Carbohydrate Drink Given Prior to Surgery? : N  Date of Last Liquid: 12/11/24  Time of Last Liquid: 0000  Date of Last Solid: 12/11/24  Time of Last Solid: 0000  Last Intake Type: Clear fluids  Time of Last Void: 0000         Physical Exam    Airway  Mallampati: II  TM distance: >3 FB  Neck ROM: full     Cardiovascular   Rhythm: regular  Rate: normal     Dental   Comments: Permanent upper bridge   Pulmonary   Breath sounds clear to auscultation     Abdominal            Anesthesia Plan    History of general anesthesia?: yes  History of complications of general anesthesia?:  yes    ASA 2     general   (GETA, standard monitors, PIVx2)  The patient is not a current smoker.    intravenous induction   Postoperative administration of opioids is intended.  Trial extubation is planned.  Anesthetic plan and risks discussed with patient.  Use of blood products discussed with patient who consented to blood products.    Plan discussed with CAA.

## 2024-12-11 NOTE — ANESTHESIA PROCEDURE NOTES
Airway  Date/Time: 12/11/2024 12:46 PM  Urgency: elective      Staffing  Performed: SHRADDHA   Authorized by: Buddy Fisher DO    Performed by: SHRADDHA Laughlin  Patient location during procedure: OR    Indications and Patient Condition  Indications for airway management: anesthesia  Spontaneous ventilation: present  Sedation level: deep  Preoxygenated: yes  Mask difficulty assessment: 1 - vent by mask    Final Airway Details  Final airway type: endotracheal airway      Successful airway: ETT  Cuffed: yes   Successful intubation technique: direct laryngoscopy  Blade size: #3  ETT size (mm): 7.0  Cormack-Lehane Classification: grade I - full view of glottis  Measured from: lips  Number of attempts at approach: 1  Ventilation between attempts: BVM

## 2024-12-11 NOTE — DISCHARGE INSTRUCTIONS
Robotic Hysterectomy Discharge Instructions    Anesthesia Precautions & Expectations:  After anesthesia, rest for 24 hours.  Do not drive, drink alcoholic beverages or make any important decisions during this time.  General anesthesia may cause a sore throat, jaw discomfort or muscle aches.  These symptoms can last for one or two days.    What to Expect after Surgery:  A small amount of vaginal drainage is normal for 2 to 4 weeks after surgery. Clear to light pink is normal. Spotting is also normal. You may need to wear sanitary (maxi) pads during this time.  Mild or moderate abdominal discomfort and tenderness.  Chest and shoulder discomfort. Walking and changing positions will decrease your discomfort.    Incisional care  Paper tape steri-strips are typically used for the abdominal incisions. The steri-strips will fall off on their own or can be removed at your first post-operative appointment.   You may shower and use a mild soap around the incisions and pat dry. Do not use a washcloth or scrub the incisions. Using peroxide or antiseptics is not recommended for routine care. Avoid hot and steamy showers as this may cause you to feel faint. No tub baths for six weeks following surgery.   There may be discoloration or bruising around the incisions. This is normal and may take several weeks to resolve. Firmness or a nodular area under the skin near the incision may represent a collection of blood, this too will resolve on its own after a little time. If any incision develops tenderness, redness in the skin layer or has drainage please call the office.    Vaginal Discharge  You may have a mildly malodorous discharge and occasional spotting for up to 6 weeks. Do not put anything in the vagina like tampons or have sexual intercourse for 6 weeks after surgery. If you are having bleeding like a period, that is abnormal and you should contact your doctor.    Medications and Pain Management  Common areas of pain after  laparoscopic hysterectomy include the incision pain, pain in between your shoulder blades, the pelvis and lower back. The gas that was used to distend your abdomen for the surgery is absorbed slowly into your blood stream over the first 3-4 days after surgery. It is not passed intestinally, although, because your abdomen is distended, it may feel similar to intestinal gas. Staying active and walking is the best way to promote the absorption of this gas.  Immediately after surgery, nerve pain is the most intense, typically for the first 6 to 12 hours. As the body heals, it creates inflammation around the incisions sites adding pressure and creating soreness. After 5 days, the inflammation begins to recede and significant improvement in soreness is expected. Pulling on the incisions, especially if sudden, such as when you cough, will reactivate the nerve pain. Support your abdomen with a pillow during coughing or sneezing as this will be helpful to minimize pain.   Taking regular anti-inflammatory pills, such as 600mg of Ibuprofen every 6 hours for the first 5 days and then as needed is recommended. You can alternate ibuprofen with tylenol (975mg or 1000mg). The tylenol can be taken every 6 hours.  If you have problems using NSAIDs, be sure to discuss this with your doctor. The narcotic can be used on a schedule for the first 1 to 2 days but after that, only as you need it.   Oxycodone is prescribed for you. Take them for breakthrough pain that is not well managed with tylenol and ibuprofen. These medicines can cause constipation, nausea, sleepiness and headaches. You may begin your usual home medications as you were taking before unless directed by your doctor.    Please Dispose of Your Unused Pain Medications:  Studies have found that over 80% of patients will have leftover narcotic pain medication after their procedure.  Over 90% of these patients keep them, rather than disposing of them.  According to the National  Survey on Drug Use and Health, about half of individuals who misuse prescription painkillers obtain them from a friend or family member.  The Centers for Disease Control and Prevention reports that over 10,000 overdose deaths occur each year due to prescription painkillers.    GI Function, Nausea and Constipation  Nausea can occasionally be an issue in the first few days after surgery. It is usually caused as a side effect from the anesthesia and pain medicine, particularly narcotics. Taking the pain pills with food is a food way to proactively minimize this. Throwing up, especially after the first day, is not expected and if this happens, you should call your doctor. Feeling gassy and constipation can be a problem for the first week after surgery. Limiting the use of narcotics may be helpful. Stool softeners twice a day and a high fiber diet are safe. If needed, Miralax once daily is a good choice. If no bowel movement after 3 days, you will need to increase the Miralax until soft, regular bowel movements are passing.    Activity  For the first two days post-operatively, your soreness and recovery from anesthesia will limit your activity  Stairs are safe, just take your time  At a minimum during this time, you should walk around for 10-15 minutes every 2-3 hours. After that, in the first week, any activity except for overt exercise is safe.   During the first week you should not commit to being on your feet for more than 30 minutes at a time.   During the second week, light exercise is encouraged.  After 2 weeks from surgery, you should try to get back into regular activity other than heavy lifting.   For healing, please limit the amount of weight lifted to 8-10 pounds (a gallon of milk) for the first 6 weeks after surgery.   Driving is usually okay after the first few days. You must be able to comfortably wear a seatbelt, press the gas/brake pedals, and drive defensively. You may not drive while taking narcotic  pain medicines.    When to Call the Doctor:  Wound Infection Symptoms  More pain around the wound  Change in the amount , color and odor of drainage  The skin around the wound feels warm or has red streaks  The wound separates or opens up  You have a temperature greater than 100.4    Deep Vein Thrombosis Symptoms (blood clot in the leg)  Tender, swollen or reddened areas anywhere in your leg.  Numbness or tingling in your lower leg or calf, or at the top of your leg or groin  Skin on you leg looks pale or blue or feels cold to touch  Chest pain or have trouble breathing  Fever or chills    Fever or Chills  -Have a temperature greater than 100.4 degrees F that lasts more than 1 hour and/or chills.    Nausea and Vomiting  Have nausea and vomiting that will not let you keep medicine or fluids down.  Are unable to tolerate food or liquids, you have severe abdominal cramping, or your abdomen is getting bigger/dixon.    Unrelieved Pain  Your pain gets worse or is not eased 1 hour after taking your pain medicine.    Urinary Tract Infection Symptoms  Urine is cloudy, bloody or has a bad odor  You leak urine or you have to urinate more often  You feel burning when you urinate  You have a temperature greater than 100.4    Contacts:  [facility], [address], [id number] - [facility], [address] [phone]

## 2024-12-11 NOTE — ASSESSMENT & PLAN NOTE
Cheyanne Rubio is a 54 y.o. female with pathogenic PALB2 mutation, history of endometriosis.  -plan for RA-TLH, RRBSO   -Surgical consent was reviewed. The risks of surgery were discussed including: bleeding (including need for blood transfusion in life-threatening situations; risks of transfusion), infection, damage to surrounding organs. The patient had the opportunity to answer questions and desired to proceed with surgery following our discussion. Both verbal and written consents were obtained.

## 2024-12-11 NOTE — OP NOTE
Robot-assisted bilateral salpingo-oophorectomy, possible hysterectomy, possible staging, all indicated procedures (B) Operative Note     Date: 2024  OR Location: [facility] OR    Name: Cheyanne Rubio YOB: 1970, Age: 54 y.o., MRN: [id number], Sex: female    Diagnosis  Pre-op Diagnosis      * History of endometriosis [Z87.42]     * Pelvic mass [R19.00] Post-op Diagnosis     * History of endometriosis [Z87.42]     * Pelvic mass [R19.00]     Procedures  Diagnostic laparoscopy    Surgeons   Panel 1:     * Yvette Gonsales - Primary    Resident/Fellow/Other Assistant:  Surgeons and Role:  Panel 1:     * Mike Joseph MD - Assisting    Staff:   Circulator: Yesenia Pacheco Person: Arti  Circulator: Maren Robbins Circulator: [place]  Relief Circulator: Soni    Anesthesia Staff: Anesthesiologist: Buddy Fisher DO  C-AA: SHRADDHA Laughlin    Procedure Summary  Anesthesia: General  ASA: II  Estimated Blood Loss: 5mL  Intra-op Medications:   Administrations occurring from 1220 to 1840 on 24:   Medication Name Total Dose   ceFAZolin (Ancef) vial 1 g 2 g   dexAMETHasone (Decadron) injection 4 mg/mL 8 mg   fentaNYL (Sublimaze) injection 50 mcg/mL 25 mcg   HYDROmorphone (Dilaudid) injection 1 mg/mL 0.2 mg   LR bolus Cannot be calculated   lidocaine (cardiac) injection 2% prefilled syringe 100 mg   magnesium sulfate 50 % injection 1 g   midazolam PF (Versed) injection 1 mg/mL 2 mg   propofol (Diprivan) injection 10 mg/mL 150 mg   rocuronium (ZeMuron) 50 mg/5 mL injection 70 mg              Anesthesia Record               Intraprocedure I/O Totals       None           Specimen: No specimens collected              Drains and/or Catheters:   NG/OG/Feeding Tube OG - Nikolai sump 16 Fr Center mouth (Active)       Tourniquet Times:         Implants:     Findings: Minimal adhesions    Indications: Cheyanne Rubio is an 54 y.o. female who is having surgery for History of endometriosis [Z87.42]  Pelvic mass [R19.00].   She had previously undergone bilateral mastectomy with abdominal wall base tissue reconstruction and abdominal wall reconstruction with biologic mesh.  Given this, Dr. Gonsales had referred this patient to me for assistance with abdominal access and possible lysis of adhesions.     The patient was seen in the preoperative area. The risks, benefits, complications, treatment options, non-operative alternatives, expected recovery and outcomes were discussed with the patient. The possibilities of reaction to medication, pulmonary aspiration, injury to surrounding structures, bleeding, recurrent infection, the need for additional procedures, failure to diagnose a condition, and creating a complication requiring transfusion or operation were discussed with the patient. The patient concurred with the proposed plan, giving informed consent.  The site of surgery was properly noted/marked if necessary per policy. The patient has been actively warmed in preoperative area. Preoperative antibiotics have been ordered and given within 1 hours of incision. Venous thrombosis prophylaxis have been ordered including bilateral sequential compression devices and chemical prophylaxis.    Procedure Details:   The patient was brought to the operating room and anesthesia was induced uneventfully. Hair over the operative site was clipped.  A bladder catheter and uterine manipulator were inserted by the gynecology team. The skin was prepared using Chlorhexidine, which was allowed to dry for 3 minutes prior to draping.    After confirming the presence and function of an orogastric tube, the abdomen was accessed using an optical access technique utilizing a 5 mm camera.  This was performed approximately 5 cm below the left costal margin, which was in the area of the abdomen where the patient still had rectus muscle present and therefore would have normal anatomic landmarks.   The abdomen was then insufflated to 15 mmHg using CO2.  After  insufflation, the abdomen was inspected.  There was no overt injury to any intra-abdominal organ or visible bleeding.    The abdomen was inspected.  Fortunately, there was no overt evidence of adhesive disease to the abdominal wall.  Dr. Gonsales then placed 3 additional 8 mm ports in the lower abdomen to facilitate the robotic portion of this operation. I turned the remainder of the case over to her.    Complications:   None noted for this portion of the procedure     Disposition:  Operation ongoing  Condition: stable       Attending Attestation:  I was present and scrubbed for the dictated portion of the procedure.    Mike Joseph MD  Assistant Professor of Surgery  Division of General Surgery  Department of Surgery

## 2024-12-11 NOTE — H&P
History Of Present Illness  Cheyanne Rubio is a 54 y.o. female with pathogenic PALB2 mutation, history of endometriosis for RRBSO/hysterectomy.      Past Medical History  Past Medical History:   Diagnosis Date    Abnormal uterine and vaginal bleeding, unspecified 06/08/2018    Abnormal uterine bleeding (AUB)    Acute bronchitis, unspecified 06/08/2018    Acute bronchitis    Acute pharyngitis, unspecified 06/08/2018    Pharyngitis    Anemia, unspecified 06/08/2018    Anemia    Benign neoplasm of unspecified ovary     Dermoid cyst of ovary    Dysmenorrhea, unspecified 06/08/2018    Dysmenorrhea    Dysuria 06/08/2018    Dysuria    Encounter for screening mammogram for malignant neoplasm of breast 06/08/2018    Visit for screening mammogram    Histoplasmosis, 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    Monoallelic mutation of PALB2 gene     Other ovarian dysfunction 10/24/2014    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    Pain in unspecified joint 06/08/2018    Arthralgia of multiple sites    Personal history of other diseases of the female genital tract     Personal history of endometriosis    Personal history of other diseases of the female genital tract     Personal history of female infertility    Polyp of corpus uteri     Uterine polyp    Stricture and stenosis of cervix uteri     Cervical stenosis (uterine cervix)    Thyroid nodule     Vitamin D deficiency, unspecified 06/08/2018    Vitamin D deficiency       Surgical History  Past Surgical History:   Procedure Laterality Date    CT ABDOMEN PELVIS ANGIOGRAM W AND/OR WO IV CONTRAST  05/22/2023    CT ABDOMEN PELVIS ANGIOGRAM W AND/OR WO IV CONTRAST 5/22/2023 [facility] CT    DILATION AND CURETTAGE OF UTERUS  10/20/2014    Dilation And Curettage    EXPLORATORY LAPAROTOMY      HYSTEROSCOPY      LAPAROSCOPY DIAGNOSTIC / BIOPSY / ASPIRATION / LYSIS  10/20/2014    Exploratory Laparoscopy    OTHER SURGICAL HISTORY  10/20/2014    Hysteroscopy        Social  History  She reports that she has never smoked. She has never been exposed to tobacco smoke. She has never used smokeless tobacco. She reports current alcohol use of about 2.0 standard drinks of alcohol per week. She reports that she does not use drugs.    Family History  Family History   Problem Relation Name Age of Onset    Hypertension Mother      Pancreatic cancer Mother      Bone cancer Father      Pancreatic cancer Father      Prostate cancer Father      Rheum arthritis Maternal Grandmother      Breast cancer Other aunt         Allergies  Oxycodone, Codeine-guaifenesin, Nitro transdermal, Tramadol, and Codeine    Physical Exam  General: no acute distress  HEENT: normocephalic, atraumatic  Heart: warm and well perfused  Lungs: breathing comfortably on room air  Abdomen: nondistended  Extremities: moving all extremities  Neuro: awake and conversant  Psych: appropriate mood and affect     Last Recorded Vitals  There were no vitals taken for this visit.    Relevant Results  Results for orders placed or performed in visit on 12/10/24 (from the past 96 hours)   Comprehensive metabolic panel   Result Value Ref Range    Glucose 80 74 - 99 mg/dL    Sodium 143 136 - 145 mmol/L    Potassium 4.6 3.5 - 5.3 mmol/L    Chloride 104 98 - 107 mmol/L    Bicarbonate 29 21 - 32 mmol/L    Anion Gap 15 10 - 20 mmol/L    Urea Nitrogen 9 6 - 23 mg/dL    Creatinine 0.78 0.50 - 1.05 mg/dL    eGFR >90 >60 mL/min/1.73m*2    Calcium 9.9 8.6 - 10.6 mg/dL    Albumin 4.8 3.4 - 5.0 g/dL    Alkaline Phosphatase 90 33 - 110 U/L    Total Protein 7.1 6.4 - 8.2 g/dL    AST 12 9 - 39 U/L    Bilirubin, Total 0.6 0.0 - 1.2 mg/dL    ALT 11 7 - 45 U/L   CBC   Result Value Ref Range    WBC 6.1 4.4 - 11.3 x10*3/uL    nRBC 0.0 0.0 - 0.0 /100 WBCs    RBC 4.33 4.00 - 5.20 x10*6/uL    Hemoglobin 13.4 12.0 - 16.0 g/dL    Hematocrit 40.4 36.0 - 46.0 %    MCV 93 80 - 100 fL    MCH 30.9 26.0 - 34.0 pg    MCHC 33.2 32.0 - 36.0 g/dL    RDW 12.3 11.5 - 14.5 %     Platelets 229 150 - 450 x10*3/uL   Type and screen   Result Value Ref Range    ABO TYPE B     Rh TYPE NEG     ANTIBODY SCREEN NEG              Assessment/Plan   Assessment & Plan  History of endometriosis    Abdominal wall bulge    Cheyanne Rubio is a 54 y.o. female with pathogenic PALB2 mutation, history of endometriosis.  -plan for RA-TLH, RRBSO   -Surgical consent was reviewed. The risks of surgery were discussed including: bleeding (including need for blood transfusion in life-threatening situations; risks of transfusion), infection, damage to surrounding organs. The patient had the opportunity to answer questions and desired to proceed with surgery following our discussion. Both verbal and written consents were obtained.       Rodolfo Vasquez MD

## 2024-12-11 NOTE — OP NOTE
Robot-assisted bilateral salpingo-oophorectomy total laparoscopic hysterectomy, bilateral TAP block (B) Operative Note     Date: 2024  OR Location: [facility] OR    Name: Cheyanne Rubio YOB: 1970, Age: 54 y.o., MRN: [id number], Sex: female    Diagnosis  Pre-op Diagnosis      * History of endometriosis [Z87.42]     * Pelvic mass [R19.00] Post-op Diagnosis     * History of endometriosis [Z87.42]     * Pelvic mass [R19.00]     Procedures  Robot-assisted bilateral salpingo-oophorectomy total laparoscopic hysterectomy, bilateral TAP block  74482 - NM LAPAROSCOPY W/RMVL ADNEXAL STRUCTURES      Surgeons      * Yvette Gonsales - Primary    Resident/Fellow/Other Assistant:  Surgeons and Role:     * Mike Joseph MD - Assisting     * Diane Ferris MD - Resident - Assisting     * Rodolfo Vasquez MD - Fellow  Viktoria Valdes MD - Resident    Staff:   Circulator: Yesenia  Scrub Person: Arti  Circulator: Maren  Relief Circulator: [place]  Relief Circulator: Soni  Relief Scrub: Geoff  Relief Circulator: Yesenia  Relief Circulator: Saima    Anesthesia Staff: Anesthesiologist: Buddy Fisher DO  C-AA: SHRADDHA Laughlin    Procedure Summary  Anesthesia: General  ASA: II  Estimated Blood Loss: 10mL  Intra-op Medications:   Administrations occurring from 1220 to 1840 on 24:   Medication Name Total Dose   surgical lubricant gel 1 Application   sodium chloride 0.9 % irrigation solution 1,000 mL   sterile water irrigation solution 500 mL   Unable to Find 41 mL   ceFAZolin (Ancef) vial 1 g 2 g   dexAMETHasone (Decadron) injection 4 mg/mL 8 mg   fentaNYL (Sublimaze) injection 50 mcg/mL 100 mcg   HYDROmorphone (Dilaudid) injection 1 mg/mL 1 mg   LR bolus Cannot be calculated   lidocaine (cardiac) injection 2% prefilled syringe 100 mg   magnesium sulfate 50 % injection 1 g   midazolam PF (Versed) injection 1 mg/mL 2 mg   ondansetron (Zofran) 2 mg/mL injection 4 mg   propofol (Diprivan) injection 10 mg/mL 200 mg    rocuronium (ZeMuron) 50 mg/5 mL injection 100 mg   sugammadex (Bridion) 200 mg/2 mL injection 200 mg              Anesthesia Record               Intraprocedure I/O Totals          Intake    LR bolus 1000.00 mL    Total Intake 1000 mL       Output    Urine 100 mL    Est. Blood Loss 10 mL    Total Output 110 mL       Net    Net Volume 890 mL          Specimen:   ID Type Source Tests Collected by Time   1 : UTERUS, CERVIX, BILATERAL FALLOPIAN TUBES AND OVARIES Tissue UTERUS, CERVIX, FALLOPIAN TUBES AND OVARIES BILATERAL SURGICAL PATHOLOGY EXAM Yvette Gonsales MD 12/11/2024 1424                 Drains and/or Catheters:   [REMOVED] NG/OG/Feeding Tube OG - [place]gora sump 16 Fr Center mouth (Removed)       [REMOVED] Urethral Catheter Non-latex 16 Fr. (Removed)           Findings:   Unremarkable uterus, cervix, bilateral fallopian tubes, and ovaries. Normal external female genitalia and vagina. Unremarkable omentum, peritoneum, and liver surface.     Indications: Cheyanne Rubio is an 54 y.o. female who is having surgery for History of endometriosis [Z87.42]  Pelvic mass [R19.00].     The patient was seen in the preoperative area. The risks, benefits, complications, treatment options, non-operative alternatives, expected recovery and outcomes were discussed with the patient. The possibilities of reaction to medication, pulmonary aspiration, injury to surrounding structures, bleeding, recurrent infection, the need for additional procedures, failure to diagnose a condition, and creating a complication requiring transfusion or operation were discussed with the patient. The patient concurred with the proposed plan, giving informed consent.  The site of surgery was properly noted/marked if necessary per policy. The patient has been actively warmed in preoperative area. Preoperative antibiotics have been ordered and given within 1 hours of incision. Venous thrombosis prophylaxis have been ordered including bilateral sequential  compression devices    Procedure Details:   The patient was identified in preoperative holding area with correct name and medical record number. Informed consent was reviewed and all remaining questions answered. The patient was then taken to the operating room where sequential compression devices were placed and perioperative antibiotics were administered. The patient was placed under general anesthesia and then positioned in dorsal lithotomy position. After sterile prep and draping, a Parker catheter and speculum were placed. A single-tooth tenaculum was placed on the anterior lip of the cervix. The cervix was dilated and a V-care uterine manipulator was inserted into the uterus without difficulty. The tenaculum and speculum were removed from the vagina.      Dr. Joseph began his abdominal port placement, starting at [place]. A 5 mm incision was made with a scalpel. The 5mm laparoscopic trocar was placed under direct visualization with the 5mm laparoscopic camera, and entry into the peritoneal cavity was confirmed under direct visualization. Pneumoperitoneum was established. No evidence of injury was appreciated at the entry site. There was no evidence of adhesive disease limiting our view, therefore we did not perform any GUME. We then placed the 8mm supraumbilical robotic trocar and the remaining 3 trocars: two 8mm trochars in the right abdomen and an 8mm trochar in the left abdomen. We then exchanged the 5mm trocar for the Airseal.  The patient was placed in steep Trendelenburg, and the bowels were moved into the upper abdomen. The DineroTaxii robotic surgical system was brought to the patient's bedside and docked.     A pelvic survey revealed an unremarkable uterus, fallopian tubes, and ovaries. The uterus was then anteverted. The left and right round ligaments were identified, cauterized, and ligated. The retroperitoneum was entered and the peritoneum was incised in a plane lateral and parallel to the ovarian  blood supply bilaterally. The left and right paravesical and pararectal spaces were developed. The left and right ureters were visualized and noted to be well posterior to the ovarian blood supply.      The left infundibulopelvic ligament was skeletonized, coagulated, and ligated. The left posterior peritoneum was then incised taking care to note the location of the ureter. The peritoneal dissection was then carried anteriorly, and the bladder was carefully dissected off the uterus and cervix to create a bladder flap. The same process was then repeated on the left side. The uterine vessels were then skeletonized, coagulated, and ligated bilaterally. We performed straight bites down the length of the cervix until we were at the level of the internal os utilizing the V Care manipulator's cup as a geographic land justin. A circumferential colpotomy was then performed with cautery. The uterus, cervix, bilateral fallopian tubes, and bilateral ovaries were then delivered through the vagina and sent for permanent section.     All pedicles were inspected and were completely hemostatic. A pneumo-occluder was placed in the vagina to maintain pneumoperitoneum. The vaginal cuff was re-approximated using #0 V-loc suture in a running fashion. Copious irrigation was performed and hemostasis was appreciated.      The robot was undocked. All ports were removed under direct visualization. The fascia of all the robotic ports was closed with PDS using a Jared Lyric. The subcutaneous layer was closed with 2-0 vicryl. All skin incisions were then closed with 4-0 Monocryl in a subcuticular fashion. Dermabond vs. Steristrips: Steristrips were applied as well as island dressings. The Parker catheter and pneumo-occluder were removed. The patient tolerated the procedure well.      Sponge, lap, and needle counts were correct x2. Dr. Gonsales was present for the key portions of the procedure. General anesthesia was reversed. The patient tolerated  the procedure well and was returned to the PACU in stable condition.   Complications:  None; patient tolerated the procedure well.    Disposition: PACU - hemodynamically stable.  Condition: stable       Additional Details: None    Attending Attestation:     Yvette Gonsales  Phone Number: [phone]

## 2024-12-12 VITALS
HEART RATE: 62 BPM | TEMPERATURE: 98.6 F | SYSTOLIC BLOOD PRESSURE: 108 MMHG | HEIGHT: 68 IN | BODY MASS INDEX: 28.84 KG/M2 | DIASTOLIC BLOOD PRESSURE: 73 MMHG | WEIGHT: 190.3 LBS | OXYGEN SATURATION: 95 % | RESPIRATION RATE: 18 BRPM

## 2024-12-12 LAB — GLUCOSE BLD MANUAL STRIP-MCNC: 107 MG/DL (ref 74–99)

## 2024-12-12 PROCEDURE — 2500000004 HC RX 250 GENERAL PHARMACY W/ HCPCS (ALT 636 FOR OP/ED)

## 2024-12-12 PROCEDURE — 99238 HOSP IP/OBS DSCHRG MGMT 30/<: CPT | Performed by: NURSE PRACTITIONER

## 2024-12-12 PROCEDURE — 99238 HOSP IP/OBS DSCHRG MGMT 30/<: CPT

## 2024-12-12 PROCEDURE — 82947 ASSAY GLUCOSE BLOOD QUANT: CPT

## 2024-12-12 PROCEDURE — G0378 HOSPITAL OBSERVATION PER HR: HCPCS

## 2024-12-12 PROCEDURE — 2500000001 HC RX 250 WO HCPCS SELF ADMINISTERED DRUGS (ALT 637 FOR MEDICARE OP)

## 2024-12-12 PROCEDURE — 7100000011 HC EXTENDED STAY RECOVERY HOURLY - NURSING UNIT

## 2024-12-12 RX ORDER — SIMETHICONE 80 MG
80 TABLET,CHEWABLE ORAL 4 TIMES DAILY PRN
COMMUNITY
Start: 2024-12-12

## 2024-12-12 ASSESSMENT — COGNITIVE AND FUNCTIONAL STATUS - GENERAL
MOBILITY SCORE: 24
DAILY ACTIVITIY SCORE: 24

## 2024-12-12 ASSESSMENT — PAIN - FUNCTIONAL ASSESSMENT
PAIN_FUNCTIONAL_ASSESSMENT: 0-10

## 2024-12-12 ASSESSMENT — PAIN SCALES - GENERAL
PAINLEVEL_OUTOF10: 7
PAINLEVEL_OUTOF10: 4
PAINLEVEL_OUTOF10: 3
PAINLEVEL_OUTOF10: 7
PAINLEVEL_OUTOF10: 6

## 2024-12-12 NOTE — PROGRESS NOTES
12/12/24 1000   Discharge Planning   Living Arrangements Spouse/significant other   Support Systems Spouse/significant other   Assistance Needed none   Type of Residence Private residence   Who is requesting discharge planning? Provider   Home or Post Acute Services None   Expected Discharge Disposition Home     Cheyanne Rubio is a 54 y.o. female w/ pathogenic PALB2 mutation , history of endometriosis who was admitted for surgery and is now s/p robot-assisted hysterectomy, and salpingo-oophorectomy on 12/11/24. ADOD 12/12, H[facility].     This SW/TCC met with pt to introduce self and role.  Per the medical team, this patient has no anticipated discharge needs; full assessment deferred at this time.  Please advise SW/TCC if discharge planning needs arise.  Letty Sellers RN TCC

## 2024-12-12 NOTE — PROGRESS NOTES
"Gynecologic Oncology Inpatient Daily Progress Note    Cheyanne Rubio is a 54 y.o. female on day 0 of admission presenting with History of endometriosis.      Subjective   Seen at bedside in NAD, Reports pain is better controlled with oxycodone overnight as long as she takes antiemetics. Has not yet tried meal yet. Voiding freely, ambulating.       Objective        Last Recorded Vitals  Blood pressure 103/69, pulse 68, temperature 36.1 °C (97 °F), temperature source Temporal, resp. rate 18, height 1.715 m (5' 7.5\"), weight 86.3 kg (190 lb 4.8 oz), SpO2 95%.  Intake/Output last 3 Shifts:    Intake/Output Summary (Last 24 hours) at 12/12/2024 0647  Last data filed at 12/12/2024 0300  Gross per 24 hour   Intake 1370 ml   Output 1410 ml   Net -40 ml       Physical Exam  Vitals reviewed.   Constitutional:       Appearance: Normal appearance.   HENT:      Head: Normocephalic and atraumatic.   Eyes:      Extraocular Movements: Extraocular movements intact.   Pulmonary:      Effort: Pulmonary effort is normal.   Abdominal:      General: Abdomen is flat.      Palpations: Abdomen is soft.      Comments: Appropriately tender post op. Robotic port sites with dressing overlying, c/d/I.   Musculoskeletal:         General: Normal range of motion.   Skin:     General: Skin is warm and dry.   Neurological:      General: No focal deficit present.      Mental Status: She is alert and oriented to person, place, and time.   Psychiatric:         Mood and Affect: Mood normal.         Behavior: Behavior normal.            Assessment/Plan   54 y.o. female w/ pathogenic PALB2 mutation , history of endometriosis now POD1 from robot-assisted hysterectomy, salpingo-oophorectomy.     Post-operative state   - Pain better controlled with ERAS medications. Encouraged pt to use oxycodone as needed.   - VS wnl   - Saturating well ORA. IS q1hr   - Regular diet, plans to eat breakfast this AM  - Antiemetics, Simethicone PRN  - Parker removed in OR, voiding " freely  - Requesting HRT, will discuss with Dr. Espitia today regarding appropriate start time     DVT ppx  - SCDs, subQ heparin, transition to lovenox today vs no AC given no cancer diagnosis  - encourage ambulation    Dispo: anticipate discharge today pending pain control    Diane Ferris MD  PGY-2, Gynecologic Oncology  Pager [phone]

## 2024-12-12 NOTE — SIGNIFICANT EVENT
Post-Op Check    Subjective     Pain is currently 8/10, worse with movement. Pain meds helping. Drank water and ate some crackers. Nauseous but has not vomited. No flatus. Has not ambulated or voided.    Objective   Vitals:    12/11/24 1900   BP: 137/85   Pulse: 66   Resp: 18   Temp: 36.4 °C (97.5 °F)   SpO2: 99%     General: Alert and oriented, in NAD  Skin: No rashes/lesions/erythema  Neuro: Awake, alert, conversational  CV: Regular rate  Respiratory: Even and unlabored  Abdominal: soft, nondistended. Appropriately tender for POD 0. No rebound or guarding. Island dressings over port site incisions clean and dry with minimal strikethrough  Extremities: Full ROM  Psych: appropriate mood and affect    Assessment/Plan      54 y.o. female w/ pathogenic PALB2 mutation , history of endometriosis now POD0 from robot-assisted hysterectomy, salpingo-oophorectomy    Post-operative state   - Pain not currently controlled. Discussed trying oxycodone with zofran to help with opioid-related nausea. Patient amenable  - VS wnl   - Saturating well ORA. IS q1hr   - Regular diet  - Antiemetics PRN  - Parker removed in OR, fu void     DVT ppx  - SCDs, subQ heparin, encourage ambulation    Marsha Chin MD, PGY-2  GynOn Pager [phone]

## 2024-12-12 NOTE — PROGRESS NOTES
Pharmacy Medication History Review    Cheyanne Rubio is a 54 y.o. female admitted for History of endometriosis. Pharmacy reviewed the patient's [id number] medications and allergies for accuracy.    The list below reflects the updated PTA list. Comments regarding how patient may be taking medications differently can be found in the Admit Orders Activity  Prior to Admission Medications   Prescriptions Last Dose Informant Patient/Chart Reported Taking?   B complex-vitamin C-folic acid (Nephro-Monie Rx) 1- mg-mg-mcg tablet Past Week Self, Spouse/Significant Other Yes   Sig: Take 1 tablet by mouth once daily with breakfast.   FLAXSEED OIL-OMEGA 3,6,9 ORAL Past Week Self, Spouse/Significant Other Yes   Sig: Take 1 capsule by mouth once daily.   acetaminophen (Tylenol) 325 mg tablet PRN Self, Spouse/Significant Other PRN   Sig: Take 3 tablets (975 mg) by mouth every 6 hours if needed   (pain).   aspirin 81 mg EC tablet Past Week Self, Spouse/Significant Other Yes   Sig: Take 1 tablet (81 mg) by mouth once daily.   biotin 5 mg capsule Past Week Self, Spouse/Significant Other Yes   Sig: Take 1 capsule (5 mg) by mouth once daily.   brimonidine 0.025 % drops PRN Self, Spouse/Significant Other PRN   Sig: Administer 1 drop into affected eye(s) once daily as needed  (red eye).   calcium carbonate (CALTRATE 600 ORAL) Past Week Self, Spouse/Significant Other Yes   Sig: Take 1 tablet by mouth once daily.   cyanocobalamin, vitamin B-12, (VITAMIN B-12 ORAL) Past Week Self, Spouse/Significant Other Yes   Sig: Take 1 capsule by mouth once daily.   fexofenadine (Allegra) 180 mg tablet Past Week Self, Spouse/Significant Other Yes   Sig: Take 1 tablet (180 mg) by mouth once daily.   fezolinetant (Veozah) 45 mg tablet Past Week Self, Spouse/Significant Other Yes   Sig: Take 1 tablet by mouth once daily.   ibuprofen 600 mg tablet PRN Self, Spouse/Significant Other PRN   Sig: Take 1 tablet (600 mg) by mouth every 6 hours if needed    (pain).   methocarbamol (Robaxin) 500 mg tablet PRN  PRN   Sig: Take 1 tablet (500 mg) by mouth 4 times a day as needed   for muscle spasms for up to 14 days.   psyllium (Metamucil) 0.4 gram capsule Not Taking Self, Spouse/Significant Other No   Sig: Take 5 capsules by mouth once daily.   Patient not taking: Reported on 12/11/2024      Facility-Administered Medications: None        The list below reflects the updated allergy list. Please review each documented allergy for additional clarification and justification.  Allergies  Reviewed by Dewayne Webber on 12/11/2024        Severity Reactions Comments    Oxycodone Medium Confusion     Codeine-guaifenesin Not Specified Other     Nitro Transdermal Not Specified Other Pt stated she has a lot of nausea when cream applied    Tramadol Not Specified Insomnia     Codeine Low Nausea/vomiting, Rash, Other             Patient declines M2B at discharge. Pharmacy has been updated to [facility], [facility] [address] [phone].    Sources used to complete the med history include:  Patient interviewed about medications with  at bedside. Patient alert, cooperative, pleasant, knew medications and doses  Epic Dispense History reviewed  Care Everywhere reviewed     Below are additional concerns with the patient's PTA list.  None    ---------------------------------  Dewayne Webber CPhT  Transitions of Care Technician  Medication reconciliation complete  Please reach out via [employer] Secure Chat for questions,   or if no response call [employer] or [employer].  [facility]

## 2024-12-12 NOTE — CARE PLAN
The patient's goals for the shift include Patient will remain free from fall and injury    The clinical goals for the shift include patient's pain will be <5/10 throughout shift.      Problem: Pain - Adult  Goal: Verbalizes/displays adequate comfort level or baseline comfort level  Outcome: Progressing     Problem: Safety - Adult  Goal: Free from fall injury  Outcome: Progressing     Problem: Discharge Planning  Goal: Discharge to home or other facility with appropriate resources  Outcome: Progressing     Problem: Chronic Conditions and Co-morbidities  Goal: Patient's chronic conditions and co-morbidity symptoms are monitored and maintained or improved  Outcome: Progressing

## 2024-12-12 NOTE — CARE PLAN
Problem: Pain - Adult  Goal: Verbalizes/displays adequate comfort level or baseline comfort level  Outcome: Progressing     Problem: Safety - Adult  Goal: Free from fall injury  Outcome: Progressing     Problem: Discharge Planning  Goal: Discharge to home or other facility with appropriate resources  Outcome: Progressing     Problem: Chronic Conditions and Co-morbidities  Goal: Patient's chronic conditions and co-morbidity symptoms are monitored and maintained or improved  Outcome: Progressing   The patient's goals for the shift include Patient will remain free from fall and injury    The clinical goals for the shift include pain management

## 2024-12-12 NOTE — DISCHARGE SUMMARY
Discharge Diagnosis  History of endometriosis    Issues Requiring Follow-Up  Requesting HRT    Test Results Pending At Discharge  Pending Labs       Order Current Status    Surgical Pathology Exam In process            Hospital Course  Cheyanne Rubio is a 54 y.o. female w/ pathogenic PALB2 mutation , history of endometriosis who was admitted for surgery and is now s/p robot-assisted hysterectomy, and salpingo-oophorectomy on 24. The patient is meeting all post-operative milestones including tolerating diet, passing flatus, voiding, ambulating, & pain controlled with oral medications. Patient was discharged home in satisfactory condition on post-operative day #1. Patient is scheduled to follow-up with Dr. Gonsales on 25, to discuss HRT at that appointment.    PMH: dysmenorrhea; history of thyroid nodule    PSH: bilateral risk reducing mastectomy     OB/GYN Hx: ; symptomatic climacteric   - Extensive history of IVF, IUI with 4mo pregnancy loss  - Last Pap ; denies prior abnormal      Social Hx:  Cheyanne Rubio  reports that she has never smoked. She has never been exposed to tobacco smoke. She has never used smokeless tobacco.  She  reports current alcohol use of about 2.0 standard drinks of alcohol per week. She  reports no history of drug use.  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 Otherwise, no acute abdominopelvic pathology.     MR pelvis w and wo IV contrast  Result Date: 10/21/2024  Impression:   1.  Complex cystic lesion in the right ovary which may represent endometrioma or dermoid, unable to differentiate the lesions without a T1 weighted sequence (the patient may be called back at no additional charge to perform a T1 sequence if clinically warranted). The apparent enhancement on the subtraction  imaging is favored to be due to misregistration artifact rather than true internal enhancement. Recommend assessment intraoperatively versus close follow-up with MRI in 6 months.   2. Uterine adenomyosis.   3. Unremarkable left ovary.           Allergies  Oxycodone, Codeine-guaifenesin, Nitro transdermal, Tramadol, and Codeine           Physical Exam   Deferred, done by MD - see progress note    Home Medications     Medication List      START taking these medications     ondansetron 4 mg tablet; Commonly known as: Zofran; Take 1 tablet (4 mg)   by mouth every 6 hours if needed for nausea for up to 20 doses.   oxyCODONE 5 mg immediate release tablet; Commonly known as: Roxicodone;   Take 1 tablet (5 mg) by mouth every 6 hours if needed for severe pain (7 -   10) for up to 12 doses.   polyethylene glycol 17 gram packet; Commonly known as: Glycolax,   Miralax; Take 17 g by mouth once daily as needed (for constipation) for up   to 10 doses.   simethicone 80 mg chewable tablet; Commonly known as: Mylicon; Chew 1   tablet (80 mg) 4 times a day as needed (gas pain).     CHANGE how you take these medications     acetaminophen 325 mg tablet; Commonly known as: Tylenol; Take 3 tablets   (975 mg) by mouth every 6 hours if needed for mild pain (1 - 3) for up to   50 doses.; What changed: reasons to take this   ibuprofen 600 mg tablet; Take 1 tablet (600 mg) by mouth every 6 hours   if needed for moderate pain (4 - 6) for up to 50 doses.; What changed:   reasons to take this   Veozah 45 mg tablet; Generic drug: fezolinetant     CONTINUE taking these medications     aspirin 81 mg EC tablet   B complex-vitamin C-folic acid 1- mg-mg-mcg tablet; Commonly known   as: Nephro-Monie Rx   biotin 5 mg capsule   brimonidine 0.025 % drops   CALTRATE 600 ORAL   fexofenadine 180 mg tablet; Commonly known as: Allegra   FLAXSEED OIL-OMEGA 3,6,9 ORAL   methocarbamol 500 mg tablet; Commonly known as: Robaxin; Take 1 tablet   (500 mg) by  mouth 4 times a day as needed for muscle spasms for up to 14   days.   psyllium 0.4 gram capsule; Commonly known as: Metamucil   VITAMIN B-12 ORAL       Outpatient Follow-Up  Future Appointments   Date Time Provider Department Center   1/2/2025  4:00 PM Yvette Gonsales MD [id number]       Rosalinda Everett, APRN-CNP

## 2024-12-16 ENCOUNTER — TELEPHONE (OUTPATIENT)
Dept: GYNECOLOGIC ONCOLOGY | Facility: HOSPITAL | Age: 54
End: 2024-12-16
Payer: COMMERCIAL

## 2024-12-16 ENCOUNTER — LAB (OUTPATIENT)
Dept: LAB | Facility: LAB | Age: 54
End: 2024-12-16
Payer: COMMERCIAL

## 2024-12-16 ENCOUNTER — TELEPHONE (OUTPATIENT)
Dept: GYNECOLOGIC ONCOLOGY | Facility: HOSPITAL | Age: 54
End: 2024-12-16

## 2024-12-16 DIAGNOSIS — R30.0 DYSURIA: ICD-10-CM

## 2024-12-16 DIAGNOSIS — N95.1 CLIMACTERIC: Primary | ICD-10-CM

## 2024-12-16 LAB
APPEARANCE UR: CLEAR
BILIRUB UR STRIP.AUTO-MCNC: NEGATIVE MG/DL
COLOR UR: NORMAL
GLUCOSE UR STRIP.AUTO-MCNC: NORMAL MG/DL
KETONES UR STRIP.AUTO-MCNC: NEGATIVE MG/DL
LEUKOCYTE ESTERASE UR QL STRIP.AUTO: NEGATIVE
NITRITE UR QL STRIP.AUTO: NEGATIVE
PH UR STRIP.AUTO: 5 [PH]
PROT UR STRIP.AUTO-MCNC: NEGATIVE MG/DL
RBC # UR STRIP.AUTO: NEGATIVE /UL
SP GR UR STRIP.AUTO: 1.01
UROBILINOGEN UR STRIP.AUTO-MCNC: NORMAL MG/DL

## 2024-12-16 PROCEDURE — 81003 URINALYSIS AUTO W/O SCOPE: CPT

## 2024-12-16 RX ORDER — FEZOLINETANT 45 MG/1
1 TABLET, FILM COATED ORAL DAILY
Qty: 30 TABLET | Refills: 2 | Status: SHIPPED | OUTPATIENT
Start: 2024-12-16

## 2024-12-16 NOTE — TELEPHONE ENCOUNTER
Phoned patient in follow up to [employer] message sent reporting small amount of bright red vaginal spotting following voids.   S/p robotic assisted TLH/BSO on 12/11/24.    Message left on patient voice mail advising patient to call office if experiencing urinary symptoms:  frequency, urgency, dysuria, increased pelvic pain and if not experiencing urinary symptoms to continue to monitor spotting and if bleeding increases in amount/frequency to call office back.   Return [employer] message also sent with symptoms recommendations.       1012  Patient sent [employer] message reporting hot flashes and difficulty sleeping since 12/11 robotic assisted TLH/BSO.   Patient requesting refill of Veozah.  Patient also reports difficulty emptying bladder.    + burning with voids 2 days ago, burning has since resolved.    + right sided abdominal pain, sharp intermittent pelvic pain.  Alternating Tylenol and Ibuprofen.   + BM on 12/15.  Incisions well approximated without redness/drainage.    Message routed to Dr. Gonsales and Kerrie Garcia, ABDIEL.  Kerrie recommends UA/reflex culture.   Patient notified and states she will go to  lab to provide urine specimen.    Kerrie sent refill for Veozah.

## 2024-12-17 LAB — HOLD SPECIMEN: NORMAL

## 2024-12-20 ENCOUNTER — TELEPHONE (OUTPATIENT)
Dept: GYNECOLOGIC ONCOLOGY | Facility: HOSPITAL | Age: 54
End: 2024-12-20

## 2024-12-20 ENCOUNTER — OFFICE VISIT (OUTPATIENT)
Dept: GYNECOLOGIC ONCOLOGY | Facility: HOSPITAL | Age: 54
End: 2024-12-20
Payer: COMMERCIAL

## 2024-12-20 VITALS
DIASTOLIC BLOOD PRESSURE: 75 MMHG | WEIGHT: 191.58 LBS | SYSTOLIC BLOOD PRESSURE: 113 MMHG | OXYGEN SATURATION: 96 % | HEART RATE: 71 BPM | RESPIRATION RATE: 18 BRPM | TEMPERATURE: 99.1 F | BODY MASS INDEX: 29.56 KG/M2

## 2024-12-20 DIAGNOSIS — Z90.710 S/P HYSTERECTOMY WITH OOPHORECTOMY: ICD-10-CM

## 2024-12-20 DIAGNOSIS — Z90.721 S/P HYSTERECTOMY WITH OOPHORECTOMY: ICD-10-CM

## 2024-12-20 DIAGNOSIS — N95.1 CLIMACTERIC: Primary | ICD-10-CM

## 2024-12-20 DIAGNOSIS — Z15.89 PALB2 GENE MUTATION POSITIVE: Primary | ICD-10-CM

## 2024-12-20 PROCEDURE — 99211 OFF/OP EST MAY X REQ PHY/QHP: CPT | Performed by: NURSE PRACTITIONER

## 2024-12-20 RX ORDER — VENLAFAXINE HYDROCHLORIDE 75 MG/1
75 CAPSULE, EXTENDED RELEASE ORAL DAILY
Qty: 30 CAPSULE | Refills: 11 | Status: SHIPPED | OUTPATIENT
Start: 2024-12-20 | End: 2025-12-20

## 2024-12-20 ASSESSMENT — PAIN SCALES - GENERAL: PAINLEVEL_OUTOF10: 3

## 2024-12-20 NOTE — TELEPHONE ENCOUNTER
Phoned patient in follow up to "[employer] message sent reporting large amount of bright red vaginal bleeding this monring.   Patient states she saturated clothing with bleeding.   Patient states she has been experiencing intermittent bright red vaginal bleeding following voids. + right sided abdominal pain relived with alternating Ibuprofen/Tylenol and muscle relaxant (from previous prescription). Incisions without redness, drainage.  S/p TLH/BSO 12/11/24.   12/16 UA negative.     Patient scheduled to see Diane Ahumada CNP today to assess bleeding.    Also notified patient that Dr. Gonsales sent prescription for Effexor due to reported hot flash and mood swing symptoms as Veozah not covered by insurance.   Patient states that she did  Veozah from her pharmacy and paid out of pocket as she had a coupon/discount card.   Patient asking if she should take Effexor as well to address mood changes s/p 12/11 surgery.    Message routed to Diane Ahumada CNP and Dr. Gonsales to update.

## 2024-12-20 NOTE — PROGRESS NOTES
Patient ID: Cheyanne Rubio is a 54 y.o. female.    Subjective    Patient is a 54 y.o. female with pathogenic PALB2 mutation, history of endometriosis and AUB.    Objective    Visit Vitals  /75 (BP Location: Right arm, Patient Position: Sitting)   Pulse 71   Temp 37.3 °C (99.1 °F) (Temporal)   Resp 18     Recent Imaging:  TVUS 10/3/24  IMPRESSION:  Stable complex well-defined lesion in the right ovary with echogenic  and hypoechoic components. Question stable ovarian dermoid versus  endometrioma. Stable solid and cystic neoplasm other than dermoid is  a differential consideration. Clinical and laboratory correlation are  needed.  Small cyst or prominent follicle in the right ovary on the previous  exam has resolved.  Remainder of the exam was negative.     Interval:  Patient is s/p TLH, BSO on 12/11/24.  Pathology pending.      Called the office with below complaints:  Phoned patient in follow up to [employer] message sent reporting large amount of bright red vaginal bleeding this monring.   Patient states she saturated clothing with bleeding.   Patient states she has been experiencing intermittent bright red vaginal bleeding following voids. + right sided abdominal pain relived with alternating Ibuprofen/Tylenol and muscle relaxant (from previous prescription). Incisions without redness, drainage.  S/p TLH/BSO 12/11/24.   12/16 UA negative.     Patient scheduled to see Diane Ahumada CNP today to assess bleeding.     Also notified patient that Dr. Gonsales sent prescription for Effexor due to reported hot flash and mood swing symptoms as Veozah not covered by insurance.   Patient states that she did  Veozah from her pharmacy and paid out of pocket as she had a coupon/discount card.   Patient asking if she should take Effexor as well to address mood changes s/p 12/11 surgery.    Message routed to Diane Ahumada CNP and Dr. Gonsales to update.      Interval:  Patient states her incisional pain is well managed with  Ibuprofen and Tylenol, not been taking Oxycodone or Tramadol.  Patient had some muscle relaxers at home she has been taking as needed.  Patient states she was having urinary retention last week, her urine culture was negative, symptoms have improved some but not resolved.  Patient states she was having some vaginal spotting daily until she woke up today and she had soaked thru her pajamas and then had another gush of blood when she stood up.  Patient stopped stool softeners, was having soft stool but now had to strain today.  Patient appetite decreased, taking Zofran as needed.  Patient having hot flashes, started Veozah yesterday, no improvement yet.  She states she is having emotional lability, crying at everything.  Denies any fevers.      Physical Exam:    Constitutional: Looks good.    Eyes: PERRL  ENMT: Moist mucus membranes  Head/Neck: Supple. Symmetrical  Gastrointestinal: Well healed laparoscopic sites   Genitourinary: Vaginal cuff with sutures, dissolved, good strength.  Dark red blood noted from top of vagina consistent with draining hematoma.    Musculoskeletal: ROM intact, no joint swelling, normal strength  Extremities: No edema  Neurological: Alert and oriented x 3. Pleasant and cooperative.  Psychological: Appropriate mood and behavior  Skin: Warm and dry, no lesions, no rashes    A complete review of systems was performed and all systems were normal except what is noted in the interval history.         Assessment/Plan   Patient is s/p TLH, BSO on 12/11/24, pathology pending, with vaginal cuff hematoma.  Climacteric symptoms.     Plan:  Start Effexor 37.5 mg at bedtime   Bleeding precautions reviewed  F/U as scheduled with Dr. Gonsales for post op visit  Post operative do's and dont's reviewed

## 2024-12-23 ENCOUNTER — TELEPHONE (OUTPATIENT)
Dept: GYNECOLOGIC ONCOLOGY | Facility: HOSPITAL | Age: 54
End: 2024-12-23
Payer: COMMERCIAL

## 2024-12-23 NOTE — TELEPHONE ENCOUNTER
Received myChart message from pt inquiring about pathology report. Call to pt and left message that Dr. Gonsales sent a myChart message and that her pathology is negative for cancer. Dr. Gonsales will discuss report in detail at her follow up visit.

## 2025-01-02 ENCOUNTER — OFFICE VISIT (OUTPATIENT)
Dept: GYNECOLOGIC ONCOLOGY | Facility: CLINIC | Age: 55
End: 2025-01-02
Payer: COMMERCIAL

## 2025-01-02 VITALS
DIASTOLIC BLOOD PRESSURE: 88 MMHG | RESPIRATION RATE: 17 BRPM | BODY MASS INDEX: 29.48 KG/M2 | WEIGHT: 191.03 LBS | OXYGEN SATURATION: 98 % | HEART RATE: 67 BPM | TEMPERATURE: 96.8 F | SYSTOLIC BLOOD PRESSURE: 133 MMHG

## 2025-01-02 DIAGNOSIS — Z15.89 PALB2 GENE MUTATION POSITIVE: Primary | ICD-10-CM

## 2025-01-02 DIAGNOSIS — Z90.721 S/P HYSTERECTOMY WITH OOPHORECTOMY: ICD-10-CM

## 2025-01-02 DIAGNOSIS — Z90.710 S/P HYSTERECTOMY WITH OOPHORECTOMY: ICD-10-CM

## 2025-01-02 PROCEDURE — 99211 OFF/OP EST MAY X REQ PHY/QHP: CPT | Mod: 25 | Performed by: STUDENT IN AN ORGANIZED HEALTH CARE EDUCATION/TRAINING PROGRAM

## 2025-01-02 PROCEDURE — 1036F TOBACCO NON-USER: CPT | Performed by: STUDENT IN AN ORGANIZED HEALTH CARE EDUCATION/TRAINING PROGRAM

## 2025-01-02 RX ORDER — ESTRADIOL 0.05 MG/D
1 FILM, EXTENDED RELEASE TRANSDERMAL WEEKLY
Qty: 4 PATCH | Refills: 11 | Status: SHIPPED | OUTPATIENT
Start: 2025-01-02 | End: 2025-01-03

## 2025-01-02 ASSESSMENT — PAIN SCALES - GENERAL: PAINLEVEL_OUTOF10: 2

## 2025-01-02 NOTE — PROGRESS NOTES
Patient ID: Cheyanne Rubio is a 54 y.o. female.  Referring Physician: No referring provider defined for this encounter.  Primary Care Provider: No Assigned PCP Generic Provider, MD    Subjective    Patient presents today in posteroperative follow up s/p robot-assisted TLH/BSO for PALB2 mutation, AUB. She reports she is overall recovering as anticipated. She states she is currently requiring no medications for pain control. Denies nausea/vomiting, fevers, chills, chest pain or shortness of breath. Denies redness/drainage from surgical site. Voiding and having regular bowel movements since her surgical procedure; reports symptomatic climacteric with hot flashes. No other interval changes or concerns.     A comprehensive review of systems was performed and otherwise negative.       Objective    BSA: 2.03 meters squared  /88   Pulse 67   Temp 36 °C (96.8 °F) (Temporal)   Resp 17   Wt 86.7 kg (191 lb 0.5 oz)   SpO2 98%   BMI 29.48 kg/m²      Physical Exam  Vitals and nursing note reviewed. Exam conducted with a chaperone present.   Constitutional:       Appearance: Normal appearance. She is normal weight.   HENT:      Head: Normocephalic and atraumatic.      Mouth/Throat:      Mouth: Mucous membranes are moist.   Eyes:      Extraocular Movements: Extraocular movements intact.      Conjunctiva/sclera: Conjunctivae normal.      Pupils: Pupils are equal, round, and reactive to light.   Cardiovascular:      Rate and Rhythm: Normal rate.   Abdominal:      General: Bowel sounds are normal.      Palpations: Abdomen is soft. There is no mass.      Tenderness: There is no guarding or rebound.      Hernia: No hernia is present.      Comments: Laparoscopic incisions C/D/I without redness, drainage or erythema   Genitourinary:     General: Normal vulva.      Vagina: Normal. No vaginal discharge, erythema or lesions.      Comments: Surgically absent uterus and cervix  Intact vaginal cuff without separation or drainage    Musculoskeletal:         General: Normal range of motion.      Cervical back: Normal range of motion and neck supple.   Skin:     General: Skin is warm.      Findings: No erythema or rash.   Neurological:      General: No focal deficit present.      Mental Status: She is alert and oriented to person, place, and time. Mental status is at baseline.   Psychiatric:         Mood and Affect: Mood normal.         Behavior: Behavior normal.       Pathology:  12/11/24 - Uterus, cervix, fallopian tubes and ovaries, Total hysterectomy and bilateral salpingo-oophorectomy:  -- Cervix with no specific pathologic changes.  -- Inactive endometrium.  -- Myometrium with adenomyosis and leiomyomata (measuring up to 1.2 cm).  -- Right ovary with follicle cyst.  -- Left ovary with cortical atrophy.  -- Fimbriated fallopian tubes with no specific pathologic changes.    Performance Status:  Asymptomatic    Assessment/Plan      Diagnoses and all orders for this visit:  PALB2 gene mutation positive  S/P hysterectomy with oophorectomy  - Pathology report reviewed; benign pathology with adenomyosis noted c/w history of AUB  - Postoperative precautions reviewed with patient, liberalize activity as tolerated. Pending follow up re: hernia (Zolin).   Symptomatic climacteric  - We discussed indications for hormone replacement therapy for management of climacteric symptoms in addition to bone health, cardiovascular, neurologic risk protection and colorectal cancer risk. An informed discussion regarding the impact of hormone therapy on breast cancer risk occurred, including potential stimulation of undiagnosed hormone-sensitive breast cancers with estrogen-based therapy.   - She was informed of need for close surveillance mammography for breast cancer detection and the favorable risk profile for estrogen monotherapy over combined estrogen-progestin therapy regarding breast cancer risk. In absence of a uterus, she was advised combined  estrogen/progestin therapy is not recommended and expressed interest in initiation of estrogen for HRT.   - We discussed the side effect profile of HRT, including but not limited to DVT/CVA risk, liver dysfunction as well as cancer risk profiles per Womens' Health Initiative Study findings. All questions were answered to her level of satisfaction and we will proceed with systemic HRT via estrogen patch.       Treatment Plans       No treatment plans exist

## 2025-01-03 RX ORDER — ESTRADIOL 0.05 MG/D
1 FILM, EXTENDED RELEASE TRANSDERMAL
Qty: 7 PATCH | Refills: 11 | Status: SHIPPED | OUTPATIENT
Start: 2025-01-03 | End: 2026-01-03

## 2025-01-23 ENCOUNTER — TELEMEDICINE (OUTPATIENT)
Dept: GYNECOLOGIC ONCOLOGY | Facility: CLINIC | Age: 55
End: 2025-01-23
Payer: COMMERCIAL

## 2025-01-23 DIAGNOSIS — N95.1 CLIMACTERIC: Primary | ICD-10-CM

## 2025-01-23 DIAGNOSIS — Z15.89 PALB2 GENE MUTATION POSITIVE: ICD-10-CM

## 2025-01-23 ASSESSMENT — PAIN SCALES - GENERAL: PAINLEVEL_OUTOF10: 0-NO PAIN

## 2025-01-23 NOTE — PROGRESS NOTES
Consent:  Verbal consent was requested and obtained from patient on this date for a telehealth visit.    Patient ID: Cheyanne Rubio is a 54 y.o. female.  Referring Physician: No referring provider defined for this encounter.  Primary Care Provider: No Assigned PCP Generic Provider, MD Ramachandran    Patient presents today in follow up s/p robot-assisted TLH/BSO for PALB2 mutation, AUB. She reports she is overall recovering as anticipated. No longer requiring pain medication; continues to have constipation since her procedure with bowel movements every other day. Has increased water/fluid intake; otherwise reports doing well. Marked improvement in hot flashes and sleep quality with hormone supplementation. No other interval changes or concerns.     A comprehensive review of systems was performed and otherwise negative.       Objective    BSA: There is no height or weight on file to calculate BSA.  There were no vitals taken for this visit.     Physical Exam  General:   alert and oriented, in no acute distress   Cardiovascular: no apparent distress    Lungs: normal respirations, no increased work of breathing   Neurologic:  grossly intact, no deficits       Performance Status:  Asymptomatic    Assessment/Plan    Oncology History    No history exists.     Cancer Staging   No matching staging information was found for the patient.     Diagnoses and all orders for this visit:  Climacteric  - Improved symptoms with moderate dosing strategy  - Recommend continued management with primary GYN (Rose) as appropriate in setting of persistent climacteric. Dosing strategies with lowest effective amount recommended with no indication for further dose adjustment at present   PALB2 gene mutation positive  - Continue with appropriate high risk breast surveillance; history of bilateral mastectomy with reconstruction. No contraindication to HRT in setting of surgical risk reduction without invasive cancer diagnosis  - Continue to  monitor as appropriate     Follow up PRN for any concerns    Approx 5 min spent in discussion and coordination of care with patient.      Yvette Gonsales MD

## 2025-02-07 NOTE — PROGRESS NOTES
Subjective   Patient ID: Cheyanne Rubio is a 54 y.o. female presenting for follow up.     HPI Cheyanne Rubio is a 53 y.o. female with a past medical history of pathogenic PALB2 gene mutation s/p bilateral mastectomy with immediate TRAM reconstruction and reconstruction of abdominal wall with mesh on 11/10/23. She is now s/p bilateral brachioplasty, bilateral breast revision with lateral chest liposuction and suction assisted lipectomy and skin excision of the lower abdomen on 3/27/2024.     Her Brachioplasty post operative course was complicated by left arm cellulitis since resolved with Keflex. Patient required bedside debridement and closure of left arm surgical incision with absorbable sutures 6/3/24.         Review of Systems  10 point ROS reviewed and negative except for what is stated in HPI    Objective   Physical Exam  Vitals and nursing note reviewed. Exam conducted with a chaperone present.   Constitutional:       General: She is not in acute distress.     Appearance: She is not ill-appearing.   Eyes:      Extraocular Movements: Extraocular movements intact.      Conjunctiva/sclera: Conjunctivae normal.      Pupils: Pupils are equal, round, and reactive to light.   Cardiovascular:      Rate and Rhythm: Normal rate and regular rhythm.      Pulses: Normal pulses.   Pulmonary:      Effort: Pulmonary effort is normal.      Breath sounds: Normal breath sounds.   Abdominal:      Palpations: Abdomen is soft. There is no mass. Some fullness noted superior to lower abdominal scar without fluctuance or erythema     Tenderness: There is no abdominal tenderness.      Hernia: No hernia is present.   Musculoskeletal:         General: No swelling or tenderness.      Cervical back: Normal range of motion and neck supple.   Skin:     Capillary Refill: Capillary refill takes less than 2 seconds.      Coloration: Skin is not jaundiced.      Findings: No bruising or rash.   Neurological:      General: No focal deficit present.       Mental Status: She is oriented to person, place, and time.   Psychiatric:         Mood and Affect: Mood normal.         Behavior: Behavior normal.         Thought Content: Thought content normal.         Judgment: Judgment normal.   Focused exam of breasts: Absent nipples, well healed scars. Excess fatty tissue of lateral chest wall BL with some excess tissue of L lateral breast.     Assessment/Plan     I had the pleasure of seeing Ms. Rubio today.  She is scheduled to have hysterectomy 12/11/24 with Dr. Gonsales and Dr. Joseph. She is interested in Liposuction of excess fatty tissue at the lateral  left breast, and lateral chest, bilaterally.     Last time , we discussed possible fat grafting, but upon exam and based on patient's preference of having even smaller breast, fat grafting does not seem indicated for now.     I reviewed the procedure with patient and will be available to do the liposuction on December 11.    abdomen which feels uncomfortable and could be painful. Hernia was not seen on the CT scan, however, attenuation of the mesh and mesh compromise could be suspected in my opinion. Cheyanne discussed revision with Dr. Joseph. She inquired if we can consider mini abdominoplasty at the time of mesh revision along with proposed treatment for excess skin and fatty tissue. From plastic surgery standpoint, there is no contraindications to combine both surgeries together. I recommended however to further discuss this with Dr. Joseph.     Patient was happy with the plan moving forward.

## 2025-02-17 ENCOUNTER — APPOINTMENT (OUTPATIENT)
Dept: PLASTIC SURGERY | Facility: CLINIC | Age: 55
End: 2025-02-17
Payer: COMMERCIAL

## 2025-02-17 VITALS
DIASTOLIC BLOOD PRESSURE: 83 MMHG | SYSTOLIC BLOOD PRESSURE: 127 MMHG | BODY MASS INDEX: 29.47 KG/M2 | HEART RATE: 71 BPM | WEIGHT: 191 LBS

## 2025-02-17 DIAGNOSIS — Z98.890 STATUS POST BREAST RECONSTRUCTION: ICD-10-CM

## 2025-02-17 DIAGNOSIS — L98.7 EXCESS SKIN OF ABDOMEN: ICD-10-CM

## 2025-02-17 DIAGNOSIS — L98.7 EXCESSIVE AND REDUNDANT SKIN AND SUBCUTANEOUS TISSUE: ICD-10-CM

## 2025-02-17 DIAGNOSIS — R19.00 ABDOMINAL WALL BULGE: ICD-10-CM

## 2025-02-17 DIAGNOSIS — N65.1 BREAST ASYMMETRY FOLLOWING RECONSTRUCTIVE SURGERY: ICD-10-CM

## 2025-02-17 DIAGNOSIS — L98.7 EXCESS SKIN: Primary | ICD-10-CM

## 2025-02-17 PROCEDURE — 99213 OFFICE O/P EST LOW 20 MIN: CPT

## 2025-02-17 ASSESSMENT — PAIN SCALES - GENERAL: PAINLEVEL_OUTOF10: 0-NO PAIN

## 2025-02-24 ENCOUNTER — DOCUMENTATION (OUTPATIENT)
Dept: GYNECOLOGIC ONCOLOGY | Facility: HOSPITAL | Age: 55
End: 2025-02-24
Payer: COMMERCIAL

## 2025-03-02 DIAGNOSIS — N64.89 POSTOPERATIVE BREAST ASYMMETRY: ICD-10-CM

## 2025-03-02 DIAGNOSIS — N65.1 BREAST RECONSTRUCTION DISPROPORTION: ICD-10-CM

## 2025-03-02 DIAGNOSIS — L98.7 EXCESS SKIN OF BREAST: Primary | ICD-10-CM

## 2025-03-03 ASSESSMENT — ENCOUNTER SYMPTOMS
RECTAL PAIN: 0
CONSTIPATION: 0
DIARRHEA: 0
CHEST TIGHTNESS: 0
VOMITING: 0
NAUSEA: 0
BLOOD IN STOOL: 0
APPETITE CHANGE: 0
FATIGUE: 0
LIGHT-HEADEDNESS: 0
DYSURIA: 0
DIAPHORESIS: 0
COUGH: 0
HEMATURIA: 0
ANAL BLEEDING: 0
DIFFICULTY URINATING: 0
FEVER: 0
ACTIVITY CHANGE: 0
DIZZINESS: 0
SHORTNESS OF BREATH: 0
CHILLS: 0
ABDOMINAL PAIN: 0
WEAKNESS: 0
UNEXPECTED WEIGHT CHANGE: 0
PALPITATIONS: 0

## 2025-03-03 NOTE — PROGRESS NOTES
Cheyanne Rubio  [id number]   03/03/25  8:22 AM    HPI/Subjective:  Cheyanne Rubio is a 54 y.o. female with history of PALB2 mutation s/p bilateral mastectomy with immediate TRAM reconstruction and reconstruction of abdominal wall with mesh on 11/10/23, s/p bilateral brachioplasty, bilateral breast revision with lateral chest liposuction and suction assisted lipectomy and skin excision of the lower abdomen on 3/27/2024 c/b left arm cellulitis requiring bedside debridement and closure of left arm surgical incision on 6/3/2024, now s/p robot assisted TLH/BSO on 12/11/2024.  She returns to clinic for evaluation of a bulge in her lower abdomen.      She was first seen in clinic in October 2024 for evaluation of abdominal access, adhesiolysis and closure for planned TLH/BSO.  At that visit she reported some lower abdominal bulging/protrusion that had been bothering her and it was felt there was some degree of post-TRAM bulging.  It was discussed that this can be addressed with heavyweight mesh following TLH/BSO.      She has been doing well postoperatively and met with Dr. Henderson to discuss removal of excess skin and tissue at the lateral chest, bilaterally.  She presents today to discuss mesh revision combined with mini abdominoplasty.      They note a bulge in the lower abdomen which does seem to be increasing in size over time.    They have had no episode(s) of incarceration.    They have had no episode(s) of obstructive symptoms attributed to the hernia.    They have had  one prior repair(s). Review of operative notes indicates that the abdominal wall defect was 8 x 8 cm on each side and was reconstructed as a single defect with 15 x 20cm Strattice mesh (10/23/2023)  .    Past surgical history is otherwise notable for:  Robot-assisted bilateral salpingo-oophorectomy, total laparoscopic hysterectomy, bilateral TAP block (12/11/2024), Dermatolipectomy Abdomen, Excision Lesion Skin Torso, Repair Laceration Torso,  Dermatolipectomy Upper Extremity (3/27/2024), Bilateral Skin Sparing Mastectomy, Creation Myocutaneous Flap Transverse Rectus Abdominis for Immediate Reconstruction Bilateral Breast, Reconstruction Abdominal Wall with Mesh  (11/10/2023), Hysteroscopy, Dilation and Curettage of Uterus, Diagnostic Laparoscopy, biopsy, Lysis of Adhesions (10/20/2014).    From a pain history standpoint,  Behavioral health history - Major depression  Opioid substance use - None    From a functional/activity standpoint,  Ability to perform ADLs in 30 days prior to surgery - Independent  Employment - Light physical labor -  for company, travels a lot  Sporting Activity - Intense (greater than once per week) - walks a lot, starting to run again  BMI - Body mass index is 27.69 kg/m².    Past medical history is significant for: Endometriosis, Thyroid nodule, Vitamin D deficiency, uterine polyp and monoalleclic mutation of the PALB 2 gene     Hepatic insufficiency or liver failure - No  Ascites - No  Hypertension - No  Diabetes mellitus - No   Dialysis (acute or chronic renal failure requiring dialysis within 2 weeks prior to surgery) - None  COPD - No  Dyspnea - No  Antiplatelet medications excluding aspirin - No  Anticoagulation medications - No  Aspirin - No  Immunosuppression - No  Nicotine use in relation to OR date - No  History of AAA - No  Collagen vascular disorder - No  Congestive heart failure - No  Active pregnancy - No    Review of Systems   Constitutional:  Negative for activity change, appetite change, chills, diaphoresis, fatigue, fever and unexpected weight change.   Respiratory:  Negative for cough, chest tightness and shortness of breath.    Cardiovascular:  Negative for palpitations and leg swelling.   Gastrointestinal:  Negative for abdominal pain, anal bleeding, blood in stool, constipation, diarrhea, nausea, rectal pain and vomiting.   Genitourinary:  Negative for difficulty urinating, dysuria and hematuria.    Neurological:  Negative for dizziness, weakness and light-headedness.          Objective:  Body mass index is 27.69 kg/m².  Physical Exam  Vitals reviewed. Exam conducted with a chaperone present.   Constitutional:       General: She is not in acute distress.     Appearance: Normal appearance. She is not ill-appearing.   HENT:      Head: Normocephalic.      Mouth/Throat:      Mouth: Mucous membranes are moist.   Eyes:      Extraocular Movements: Extraocular movements intact.      Pupils: Pupils are equal, round, and reactive to light.   Cardiovascular:      Rate and Rhythm: Normal rate and regular rhythm.      Pulses: Normal pulses.      Heart sounds: Normal heart sounds. No murmur heard.  Pulmonary:      Effort: Pulmonary effort is normal. No respiratory distress.      Breath sounds: Normal breath sounds. No wheezing, rhonchi or rales.   Chest:      Chest wall: No tenderness.   Abdominal:      General: There is no distension.      Palpations: There is no mass.      Tenderness: There is no abdominal tenderness. There is no guarding or rebound.      Hernia: No hernia is present.   Musculoskeletal:         General: No swelling or deformity.      Cervical back: Neck supple. No rigidity.      Right lower leg: No edema.      Left lower leg: No edema.   Skin:     General: Skin is warm.      Coloration: Skin is not jaundiced or pale.   Neurological:      General: No focal deficit present.      Mental Status: She is alert and oriented to person, place, and time. Mental status is at baseline.   Psychiatric:         Mood and Affect: Mood normal.         Behavior: Behavior normal.         Thought Content: Thought content normal.         Judgment: Judgment normal.       Imaging consisting of CT Abdomen/Pelvis (11/9/2024) was reviewed personally and was notable for absence of medial rectus musculature bilaterally, evidence of prior TRAM harvest.    Assessment/Plan:  Cheyanne Rubio is a 54 y.o. female with history of PALB2  mutation s/p bilateral mastectomy with immediate TRAM reconstruction and reconstruction of abdominal wall with mesh on 11/10/23, s/p bilateral brachioplasty, bilateral breast revision with lateral chest liposuction and suction assisted lipectomy and skin excision of the lower abdomen on 3/27/2024, now s/p robot assisted TLH/BSO on 12/11/2024 who presents with a/an symptomatic post-TRAM hernia(s), having undergone one prior repairs.    We will plan to book the patient for surgery and refer the patient for preoperative testing. Will discuss with Dr. Gorman regarding potential combined procedure with breast fat grafting. If need for panniculectomy to accomplish that, would prefer to stage procedures to given hernia repair time to heal and minimize risk of wound morbidity. Will need same day consent for open bilateral TAR.    I will see the patient back in the office in the postoperative period or sooner if needed.    The patient did [facility].    Portions of medical record reviewed for pertinent issues including active problem list, medication list, allergies, social history, health maintenance, notes from previous encounters, lab results, and imaging.      I am billing a  modifier as I am assuming management of this complex condition and anticipate having a longitudinal relationship with this patient for postoperative visits at the 3 week, 3 month, and 1 year visit, with annual visits thereafter for recurrence monitoring.    Mike Joseph MD  Assistant Professor of Surgery  Division of General Surgery  Department of Surgery

## 2025-03-10 ENCOUNTER — TELEPHONE (OUTPATIENT)
Dept: GYNECOLOGIC ONCOLOGY | Facility: HOSPITAL | Age: 55
End: 2025-03-10
Payer: COMMERCIAL

## 2025-03-10 NOTE — TELEPHONE ENCOUNTER
Received message from pt that she needed refill on her venlafaxine. Call to pt and advised that she has active prescription with refills available, so she should consult her pharmacy for refill.

## 2025-03-11 NOTE — PATIENT INSTRUCTIONS
"Patient Education     Abdominal wall hernias   The Basics   Written by the doctors and editors at [facility]   What is an abdominal wall hernia? -- The internal organs and tissues in the belly are held in place by a tough outer wall of tissue called the \"abdominal wall.\" An abdominal hernia is an area in that wall that is weak or torn. Often, when there is a hernia, organs or tissues that are normally held in place by the abdominal wall bulge or stick out through the weak or torn spot.  The size of the bulge can change depending on how much pressure is put on the abdominal wall. For example, straining when coughing or having a bowel movement can make a hernia look bigger. Lying down overnight can make the hernia look smaller, or even disappear, in the morning.  There are many different kinds of abdominal wall hernias (figure 1).  What are the symptoms of abdominal wall hernias? -- Abdominal wall hernias do not always cause symptoms. When they do, they can cause some or all of these symptoms:  A bulge somewhere on the trunk of the body - This bulge can be so small that you don't even realize it's there.  Pain, especially when coughing, straining, or using nearby muscles  A pulling sensation around the bulge  Nausea or vomiting if part of the intestine is blocked in the hernia  Abdominal wall hernias can balloon out and form a sac. That sac can hold a loop of intestine or a piece of fat that should normally be tucked inside of the belly. This can be painful and even dangerous if the tissue in the hernia gets trapped and unable to slide back into the belly. When this happens, the tissue does not get enough blood, so it can become swollen or even die (figure 2).  Should I see a doctor or nurse? -- Yes. See a doctor or nurse if you have any of the symptoms of a hernia. In most cases, doctors can diagnose a hernia just by doing an exam. During the exam, the doctor might ask you to cough or bear down while pressing on your " "hernia. This might be uncomfortable, but it is necessary to find the source of the problem.  Most of the time, the contents of the hernia can be \"reduced,\" or gently pushed back into the belly. But sometimes, the hernia gets trapped and won't go back in. If that happens, the tissue that is trapped can get damaged.  If you develop severe pain around a hernia bulge or feel sick, call your doctor or surgeon right away.  How are hernias treated? -- Not all hernias need treatment right away. But many do need to be repaired with surgery.  Surgeons can repair most hernias in 1 of 2 ways. The right surgery for you depends on the size of your hernia, where on the abdominal wall it is, whether this is the first time it is getting repaired, what your general health is like, and your surgeon's experience.  The 2 types of surgery are:  Open surgery - During an open surgery, the doctor makes 1 large cut near the hernia to repair it.  Minimally invasive surgery - \"Minimally invasive\" surgery lets the doctor make smaller cuts in the skin. They insert long, thin tools through the cuts. One of the tools has a camera (called a \"laparoscope\") on the end, which sends pictures to a TV screen. The doctor can look at the screen to see inside the body. Then, they use the long tools to do the surgery. They can control the tools directly, or with the help of a robot (this is called \"robot-assisted\" surgery).  If your hernia has reduced the blood supply to a loop of intestine, your doctor might need to remove that piece of intestine. Usually, they will then sew the intestine back together.  The recovery and aftercare for each type of hernia repair is different. Your doctor or nurse can tell you what to expect after your surgery.  All topics are updated as new evidence becomes available and our peer review process is complete.  This topic retrieved from [place] on: Jan 11, 2024.  Topic 96706 Version 10.0  Release: 31.6.4 - C32.10  © 2024 " UpToDate, Inc. and/or its affiliates. All rights reserved.  figure 1: Abdominal wall hernias     Abdominal wall hernias can happen in different parts of the torso:  Incisional hernias happen along incisions from surgery.  Umbilical hernias happen at the belly button.  Epigastric hernias happen in the midline above the belly button.  Spigelian hernias happen to the left or right of the midline, where 2 layers of muscle meet.  Lumbar hernias (not shown) happen at the back.  Inguinal hernias happen in the groin area.  Femoral hernias happen where the thigh joins the torso.  Graphic 25128 Version 4.0  figure 2: Intestines bulging through hernia     In some cases, a loop of intestine can poke through a hernia. Often, surgeons can push the loop of intestine back in. But if the loop gets trapped or does not get enough blood, surgeons sometimes have to remove it and reconnect the 2 ends of intestine that are left.  Graphic 68299 Version 3.0  Consumer Information Use and Disclaimer   Disclaimer: This generalized information is a limited summary of diagnosis, treatment, and/or medication information. It is not meant to be comprehensive and should be used as a tool to help the user understand and/or assess potential diagnostic and treatment options. It does NOT include all information about conditions, treatments, medications, side effects, or risks that may apply to a specific patient. It is not intended to be medical advice or a substitute for the medical advice, diagnosis, or treatment of a health care provider based on the health care provider's examination and assessment of a patient's specific and unique circumstances. Patients must speak with a health care provider for complete information about their health, medical questions, and treatment options, including any risks or benefits regarding use of medications. This information does not endorse any treatments or medications as safe, effective, or approved for treating a  "specific patient. UpToDate, Inc. and its affiliates disclaim any warranty or liability relating to this information or the use thereof.The use of this information is governed by the Terms of Use, available at https://www.woltersGeoshouwer.com/en/know/clinical-effectiveness-terms. 2024© UpToDate, Inc. and its affiliates and/or licensors. All rights reserved.  Copyright   © 2024 Up[employer]Date, Inc. and/or its affiliates. All rights reserved.          INFORMATION FOR PATIENTS  Dr. Joseph is a member of the Ab[facility] Core Health Quality Collaborative ([facility]) registry and routinely follows all of his hernia patients in this way.  Please see the information below. If you receive any email surveys from the [facility], please respond to these so that we can follow how you are doing after surgery.    Please take advantage of the information on the [facility] website regarding rehabilitation before and after abdominal core surgery. This information can be found by going to [email or web address], then by clicking the \"Patients\" heading, then clicking \"Abdominal Core Surgery Rehab,\" or by going directly to [email or web address]    Additionally, there is an [facility] mobile kaz that provides a wealth of information regarding postoperative recovery including stretching, modification for activities of daily living, and exercises for core strength. This can be found by going to the Kaz Store or Google Play store and searching \"[facility]\".    This information sheet tells you how your surgeon’s participation in the Abdominal Core Health Quality  Collaborative ([facility]) aims to improve care for you and many other patients with hernia disease and diseases of  the abdominal wall or abdominal core. Your surgeon will include your personal health information (such as your  name, birthdate, address, and health care treatment) in the [facility]dominal [facility] Quality Collaborative  confidential, secure data registry unless you request (as " explained below) that your personal health information  not be included. We hope you will allow your information to be included because doing so will help improve the  quality of care for patients with abdominal core health issues such as hernia, tumors, and infections and help  improve the ways surgeons prevent hernias from forming.     What is the Abdominal Core Health Quality Collaborative ([facility])?  ->The [facility] is a large group of surgeons committed to improving the quality of care for patients who have  abdominal core health problems and often undergo surgery for those problems. These surgeons are also  committed to reducing the chance of abdominal core health problems from forming in the first place.     How does it work?  -> During the routine care of your abdominal core health problem, your surgeon collects information about you  and how you do after surgery as part of your confidential health care records.  -> Your surgeon will input information about you and your surgery into a confidential, secure registry maintained  by the [facility]. Only your surgeon, the [facility] , and, in very limited circumstances, the [place] Food and  Drug Administration (FDA) will have access to any information that identifies you.  -> Your identifiable personal health information (such as your name, birthdate, address, and health care treatment  information) must be entered into the registry in order to make it useful for your surgeon’s and the [facility]’s  quality improvement efforts and so that the scientific validity of registry data can be tested. If available, your  surgeon will also enter your email address, cellular phone number, and mailing address. Your personal health  information, in an anonymous form (meaning your identity cannot be matched to your information), is combined  with anonymous information from hundreds (or thousands) of hernia patients like you so that it can be used by  those interested in improving hernia  care other than your surgeon and the [facility].  -> Your data may be provided to the FDA for the purpose of informing the use of particular medical devices used to  care for patients with abdominal core problems and the prevention of those problems. Data provided to the FDA is  typically submitted in a de-identified form, although occasionally FDA may request access to your confidential  health information under its regulatory authority to monitor public health. This information may not be  completely anonymous; however, it will be kept in a secure location and any public reports or publications that are  generated from the registry for FDA purposes will not contain information that will allow you to be personally  identified. Any release of patient protected health information will only be performed consistent with applicable  local, state and federal laws, regulations and institutional policies.  -> [facility] information, in completely anonymous form, may be used by multiple groups interested in abdominal  core health problems including health care providers, hospitals, industry, insurance companies and researchers.  -> There is no monetary compensation to you for having your information included in the [facility] registry.  -> We anticipate about 9,000 to 12,000 patients’ information being entered into the registry each year, although  the number of patients may increase as more surgeons participate in the registry.  -> The [facility] will keep your personal health information in the registry for at least 15 years to help know what  happens after your hernia operation or other surgery or treatment on a long term basis. When your information is  no longer maintained as part of the registry, it will be deleted in a secure manner consistent with applicable  privacy and patient information requirements. Your personal health information in a de-identified (anonymous)  form may be maintained indefinitely by those who have used the  anonymous registry data.     How does this benefit patients like me?  -> The information gathered from many surgeons and patients is much better than information a single surgeon has  about abdominal core health problems with regards to best technique and best use of mesh for particular patients  and particular abdominal core health issues.  -> This information is used to improve the quality of care by finding the best ways to minimize complications, pain,  and the chance of your problem coming back.  -> Although the [facility] is not a research study, having your information included in the [facility] registry can make  you eligible for future research studies. You may be contacted by your surgeon or the [facility] if you are eligible for  future research studies, and you can find more information on [email or web address].     What are the risks involved?  -> Only your surgeon, the facility where your surgery occurred, the [facility] and in rare instances FDA will have  access to your personal health information input into the registry in an identifiable form. The main risk to you for  having your information entered into the [facility] registry is that your personal health information is  unintentionally disclosed to persons who are not authorized to know your information. Multiple industry-standard  safeguards are in place to prevent this, similar to the safeguards hospitals use to protect confidential health  information during routine care of patients.  -> No system that protects confidential personal health information is 100% effective. In the unlikely event that  your information is exposed, you will be personally notified of the incident and appropriate actions will be taken to  limit potential risks to you.  -> Your participation in the registry is completely voluntary. Not including your information in the [facility] registry  will not have an impact on your care.  -> If you do not wish to have your information included in the [facility] registry  for quality improvement in your  care, please inform your surgeon or contact [facility] (see information, below; If information already has been  collected, it will remain in the registry, but no new information will be collected after 30 days of your notifying  your surgeon or [facility] of your decision not to participate).     What can I do to help?  -> A very important part of your surgeon’s care of you and of the [facility] is finding out how your abdominal core  affects your life before and after surgery.  -> In addition to clinic visits with your surgeon to follow your progress, your surgeon and the [facility] may use  different ways to follow your progress after surgery including phone calls, email, text messages, or regular mail. If  you provide your email address or cellular telephone number, your surgeon and the [facility] may send you emails  or a text messages with a link to a confidential questionnaire for you to complete. You can choose not to receive  emails or text messages as described below. Completed questionnaires become part of the anonymous data that  [facility] uses to improve the quality of care for hernia patients. The email will come from [email or web address]; please don’t  delete or ‘junk filter’ this email.  -> You may be asked to complete a follow up questionnaire a few times over the next several years which helps  your surgeon and the [facility] see how you are doing.     Will I receive email and other electronic communication?  -> Unless you choose not to participate in the [facility] (see below), emails and text messages asking you to complete  an [facility] questionnaire may be sent to you in an unencrypted format and will have no additional personal information  about you, other than possibly that you are a patient of your surgeon (the questionnaire itself will be completed on a  secure portal to the [facility] registry).  -> In deciding if you want unencrypted emails or unencrypted text messages to be sent to you, you should consider  that  communication of protected health information by unencrypted email or unencrypted text message involves  potential privacy and security risks. Specifically, unencrypted email and text message transmissions can be  intercepted in transmission or misdirected, allowing third parties to view the information. You should also consider  that your cellular telephone service provider may impose a data or other charge on you to receive a text message.  You should consider asking that any communication of sensitive information be completed by telephone or mail. If  you do not wish to receive email or text messages concerning the [facility] and questionnaires about your hernia,  please inform your surgeon or contact [facility] (see information, below).     What if I have questions or choose not to participate in the [facility]?  -> Should you have any questions, your surgeon can help provide answers for you. General information, registry  updates, and progress of the [facility] can be found at [email or web address]. You can also send an email to  [email or web address] or call [phone] any time with questions about the registry.  -> If you do not want your personal health information included in the [facility] database or you do not want to  receive emails, text messages, or other communications from [facility], please inform your surgeon or contact  [facility] (by email to [email or web address] or call [phone]).

## 2025-03-19 ENCOUNTER — APPOINTMENT (OUTPATIENT)
Dept: SURGERY | Facility: CLINIC | Age: 55
End: 2025-03-19
Payer: COMMERCIAL

## 2025-03-19 VITALS
DIASTOLIC BLOOD PRESSURE: 73 MMHG | BODY MASS INDEX: 27.28 KG/M2 | HEIGHT: 68 IN | SYSTOLIC BLOOD PRESSURE: 107 MMHG | WEIGHT: 180 LBS | RESPIRATION RATE: 16 BRPM | HEART RATE: 71 BPM

## 2025-03-19 DIAGNOSIS — K43.2 POSTOPERATIVE INCISIONAL HERNIA: ICD-10-CM

## 2025-03-19 DIAGNOSIS — Z98.890 S/P TRAM (TRANSVERSE RECTUS ABDOMINIS MUSCLE) FLAP BREAST RECONSTRUCTION: Primary | ICD-10-CM

## 2025-03-19 PROCEDURE — 99215 OFFICE O/P EST HI 40 MIN: CPT | Performed by: SURGERY

## 2025-03-19 PROCEDURE — 3008F BODY MASS INDEX DOCD: CPT | Performed by: SURGERY

## 2025-03-19 PROCEDURE — 1036F TOBACCO NON-USER: CPT | Performed by: SURGERY

## 2025-03-19 PROCEDURE — G2211 COMPLEX E/M VISIT ADD ON: HCPCS | Performed by: SURGERY

## 2025-03-19 ASSESSMENT — PAIN SCALES - GENERAL: PAINLEVEL_OUTOF10: 0-NO PAIN

## 2025-06-23 DIAGNOSIS — N95.1 CLIMACTERIC: ICD-10-CM

## 2025-06-24 RX ORDER — FEZOLINETANT 45 MG/1
1 TABLET, FILM COATED ORAL DAILY
Qty: 30 TABLET | Refills: 2 | Status: SHIPPED | OUTPATIENT
Start: 2025-06-24

## 2025-07-03 DIAGNOSIS — K43.2 INCISIONAL HERNIA, WITHOUT OBSTRUCTION OR GANGRENE: ICD-10-CM

## 2025-07-03 RX ORDER — CEFAZOLIN SODIUM 2 G/100ML
2 INJECTION, SOLUTION INTRAVENOUS ONCE
OUTPATIENT
Start: 2025-10-07

## 2025-07-03 RX ORDER — ACETAMINOPHEN 500 MG
1000 TABLET ORAL ONCE
OUTPATIENT
Start: 2025-10-07

## 2025-07-03 RX ORDER — ALVIMOPAN 12 MG/1
12 CAPSULE ORAL ONCE
OUTPATIENT
Start: 2025-10-07 | End: 2025-10-13

## 2025-07-03 RX ORDER — SCOPOLAMINE 1 MG/3D
1 PATCH, EXTENDED RELEASE TRANSDERMAL
OUTPATIENT
Start: 2025-07-03

## 2025-07-03 RX ORDER — HEPARIN SODIUM 5000 [USP'U]/ML
5000 INJECTION, SOLUTION INTRAVENOUS; SUBCUTANEOUS ONCE
OUTPATIENT
Start: 2025-10-07

## 2025-07-03 RX ORDER — GABAPENTIN 300 MG/1
300 CAPSULE ORAL ONCE
OUTPATIENT
Start: 2025-10-07

## 2025-09-08 ENCOUNTER — APPOINTMENT (OUTPATIENT)
Dept: PLASTIC SURGERY | Facility: CLINIC | Age: 55
End: 2025-09-08
Payer: COMMERCIAL

## 2025-09-22 ENCOUNTER — APPOINTMENT (OUTPATIENT)
Dept: PLASTIC SURGERY | Facility: CLINIC | Age: 55
End: 2025-09-22
Payer: COMMERCIAL

## (undated) DEVICE — SYSTEM, FIOS FIRST ENTRY, 5 X 100MM, KII ADVANCED FIXATION

## (undated) DEVICE — CATHETER TRAY, SURESTEP, 16FR, URINE METER W/STATLOCK

## (undated) DEVICE — ADHESIVE, SKIN, LIQUIBAND EXCEED

## (undated) DEVICE — DRESSING, GAUZE, PETROLATUM, XEROFORM, 5 X 9 IN, STERILE

## (undated) DEVICE — DRESSING, TRANSPARENT, TEGADERM, 4 X 4-3/4 IN

## (undated) DEVICE — DRAIN, WOUND, FLAT, HUBLESS, FULL LENGTH PERFORATION, 10 MM X 20 CM, SILICONE

## (undated) DEVICE — COVER, TIP HOT SHEARS ENDOWRIST

## (undated) DEVICE — EVACUATOR, WOUND, SUCTION, CLOSED, JACKSON-PRATT, 100 CC, SILICONE

## (undated) DEVICE — APPLIER, LIGACLIP, MULTIPLE, SMALL 9-3/8IN

## (undated) DEVICE — PREP TRAY, SKIN, DRY, W/GLOVES

## (undated) DEVICE — DRESSING, TEGADERM, CHG, 3.5 X 4.5 IN

## (undated) DEVICE — TUBING SET, TRI-LUMEN, FILTERED, F/AIRSEAL

## (undated) DEVICE — SUTURE, VICRYL, 0, 27 IN, UR-6, VIOLET

## (undated) DEVICE — APPLIER,  LIGACLIP MULTI CLIP, 30 MED 11 1/2

## (undated) DEVICE — KIT, ROBOTIC, CUSTOM UHC

## (undated) DEVICE — APPLICATOR, CHLORAPREP, W/ORANGE TINT, 26ML

## (undated) DEVICE — SYRINGE, 60 CC, LUER LOCK, MONOJECT

## (undated) DEVICE — TRAY, MINOR, SINGLE BASIN, STERILE

## (undated) DEVICE — CLIP, LIGATING, W/ADHESIVE PAD, MEDIUM, TITANIUM

## (undated) DEVICE — DRAPE, INSTRUMENT, W/POUCH, STERI DRAPE, 7 X 11 IN, DISPOSABLE, STERILE

## (undated) DEVICE — SUTURE, ETHILON, 9-0, MONO, BV130-5, BLACK

## (undated) DEVICE — Device

## (undated) DEVICE — DRAIN, CHANNEL W/TROCAR 15F

## (undated) DEVICE — DRAPE, COLUMN, DAVINCI XI

## (undated) DEVICE — CUP, SOLUTION

## (undated) DEVICE — SPONGE, HEMOSTATIC, CELLULOSE, SURGICEL, 2 X 14 IN

## (undated) DEVICE — SUTURE, VICRYL, 2-0, 27 IN, CT-1, VIOLET

## (undated) DEVICE — DRAPE, TIBURON W/ADHESIVE, 19 X 30

## (undated) DEVICE — GOWN, ASTOUND, L

## (undated) DEVICE — DRESSING, ADHESIVE, ISLAND, TELFA, 4 X 10 IN

## (undated) DEVICE — SEAL, UNIVERSAL, 5-12MM

## (undated) DEVICE — BINDER, ABDOMINAL, 4 PANEL, 12 X 46-62 IN

## (undated) DEVICE — OCCLUDER, COLPO-PNEUMO

## (undated) DEVICE — COVER, CART, 45 X 27 X 48 IN, CLEAR

## (undated) DEVICE — DRAPE, PAD, PREP, W/ 9 IN CUFF, 24 X 41, LF, NS

## (undated) DEVICE — COVER, TABLE, UHC

## (undated) DEVICE — SUTURE, PLAIN, 5-0, 18 IN, PC1, YELLOW

## (undated) DEVICE — RETRACTOR, CERVICAL CUP, VAGINAL VCARE, 32MM, SMALL

## (undated) DEVICE — STAPLER, SKIN PROXIMATE, 35 WIDE

## (undated) DEVICE — SUTURE, MONOCRYL, 4-0, 27 IN, PS-2, UNDYED

## (undated) DEVICE — SYRINGE, HYPODERMIC, CONTROL, LUER LOCK, 10 CC, PLASTIC, STERILE

## (undated) DEVICE — TOWEL, SURGICAL, NEURO, O/R, 16 X 26, BLUE, STERILE

## (undated) DEVICE — TIP, SUCTION, FRAZIER, W/CONTROL VENT, 12 FR

## (undated) DEVICE — DRESSING, ADHESIVE, ISLAND, TELFA, 2 X 3.75 IN, LF

## (undated) DEVICE — CORD, BIPOLAR,  12 FT, DISPOSABLE, LF

## (undated) DEVICE — SUTURE, SILK, 2-0, 30 IN, SH, BLACK

## (undated) DEVICE — DRESSING, ABDOMINAL, TENDERSORB, 8 X 10 IN, STERILE

## (undated) DEVICE — SYRINGE, 60ML, FASTURN W/QUICK FILL TUBE

## (undated) DEVICE — RETRACTOR, CERVICAL CUP, VCARE, STANDARD

## (undated) DEVICE — DRESSING, ISLAND, TELFA, 4 X 5 IN

## (undated) DEVICE — DRAPE, LEGGINGS, 48 X 31 IN, STERILE, LF

## (undated) DEVICE — NEEDLE, MICRODISSECTION STR 4CM

## (undated) DEVICE — WOUND VAC KIT, W/CANNISTER, 500ML, 5/PK

## (undated) DEVICE — DRESSING, NON-ADHERENT, TELFA, OUCHLESS, 3 X 8 IN, STERILE

## (undated) DEVICE — SUTURE, PDS II, 0, 27 IN, CT1, VIOLET

## (undated) DEVICE — SUTURE, VICRYL, 4-0, 27 IN, RB-1, VIOLET

## (undated) DEVICE — SUTURE, PDS II, 0, 27 IN, CT-2, VIOLET

## (undated) DEVICE — SUTURE, MONOCRYL, 3-0, 18 IN, PS2, UNDYED

## (undated) DEVICE — SUTURE, VICRYL, 2-0, 27 IN, BR/SH 27, VIOLET

## (undated) DEVICE — DRAPE, ARM XI

## (undated) DEVICE — PROTECTOR, NERVE, ULNAR, PINK

## (undated) DEVICE — BAG, DRAIN METER, URINE, 350CC, LF

## (undated) DEVICE — ADHESIVE, SKIN, MASTISOL, 2/3 CC VIAL

## (undated) DEVICE — FORCEPS, BIPOLAR FENESTRATED XI

## (undated) DEVICE — DRAPE PACK, UNIVERSAL II

## (undated) DEVICE — DRAPE, SHEET, FAN FOLDED, HALF, 44 X 58 IN, DISPOSABLE, LF, STERILE

## (undated) DEVICE — ACCESS PORT, 5MM, 100MM LENGTH, LOW PROFILE W/BLADELESS OPTICAL TIP

## (undated) DEVICE — SCISSORS, MONOPOLAR, CURVED, 8MM

## (undated) DEVICE — MANIFOLD, 4 PORT NEPTUNE STANDARD

## (undated) DEVICE — SPONGE, LAP, XRAY DECT, 18IN X 18IN, W/MASTER DMT, STERILE

## (undated) DEVICE — DRAPE, UNDERBUTTOCKS

## (undated) DEVICE — SUTURE, PDS II, 2-0, 27 IN, SH, VIOLET

## (undated) DEVICE — POSITIONING, THE PINK PAD, PIGAZZI SYSTEM

## (undated) DEVICE — DRIVER, NEEDLE, MEGA SUTURE CUT, DAVINCI XI

## (undated) DEVICE — SPONGE, GAUZE, XRAY DECT, 16 PLY, 4 X 4, W/MASTER DMT,STERILE

## (undated) DEVICE — DRESSING, GAUZE, WASHED FLUFF, LARGE, STERILE

## (undated) DEVICE — SPONGE, LAPAROTOMY, 4 X 18 IN

## (undated) DEVICE — FORCEPS, CADIERE, DAVINCI XI

## (undated) DEVICE — SUTURE, V-LOC, 0, 12IN, GS-21, GR 180 ABS

## (undated) DEVICE — GOWN, SURGICAL, SMARTGOWN, XLARGE, STERILE

## (undated) DEVICE — DRESSING, SILVER IMPREGNATED, AQUACEL AG EXTRA, 4X5

## (undated) DEVICE — STRIP, SKIN CLOSURE, STERI STRIP, REINFORCED, 0.5 X 4 IN

## (undated) DEVICE — THERAPY UNIT, 14-DAY PREVENA PLUS 125

## (undated) DEVICE — DRESSING, ASSY, PREVENA CUSTOMIZABLE

## (undated) DEVICE — NEEDLE, STIMUPLEX, INSULATED, 21GA X 4IN.

## (undated) DEVICE — CATHETER, URETHRAL, FOLEY, 2 WAY, 16 FR, 5 CC, SILICONE

## (undated) DEVICE — OBTURATOR, BLADELESS , SU

## (undated) DEVICE — TOWELS 4-PK